# Patient Record
Sex: MALE | Race: WHITE | Employment: OTHER | URBAN - METROPOLITAN AREA
[De-identification: names, ages, dates, MRNs, and addresses within clinical notes are randomized per-mention and may not be internally consistent; named-entity substitution may affect disease eponyms.]

---

## 2018-07-23 ENCOUNTER — HOSPITAL ENCOUNTER (EMERGENCY)
Age: 53
Discharge: HOME | End: 2018-07-23
Attending: EMERGENCY MEDICINE
Payer: COMMERCIAL

## 2018-07-23 VITALS
OXYGEN SATURATION: 98 % | SYSTOLIC BLOOD PRESSURE: 124 MMHG | RESPIRATION RATE: 18 BRPM | WEIGHT: 211.1 LBS | BODY MASS INDEX: 35.17 KG/M2 | HEIGHT: 65 IN | TEMPERATURE: 98.4 F | HEART RATE: 72 BPM | DIASTOLIC BLOOD PRESSURE: 84 MMHG

## 2018-07-23 DIAGNOSIS — L03.119 CELLULITIS AND ABSCESS OF LEG: Primary | ICD-10-CM

## 2018-07-23 DIAGNOSIS — L02.419 CELLULITIS AND ABSCESS OF LEG: Primary | ICD-10-CM

## 2018-07-23 PROCEDURE — 6360000002 HC RX W HCPCS: Performed by: NURSE PRACTITIONER

## 2018-07-23 PROCEDURE — 96372 THER/PROPH/DIAG INJ SC/IM: CPT

## 2018-07-23 PROCEDURE — 99281 EMR DPT VST MAYX REQ PHY/QHP: CPT

## 2018-07-23 PROCEDURE — 2500000003 HC RX 250 WO HCPCS: Performed by: NURSE PRACTITIONER

## 2018-07-23 RX ORDER — DOXYCYCLINE HYCLATE 100 MG
100 TABLET ORAL 2 TIMES DAILY
Qty: 20 TABLET | Refills: 0 | Status: SHIPPED | OUTPATIENT
Start: 2018-07-23 | End: 2018-08-02

## 2018-07-23 RX ORDER — DOXYCYCLINE 100 MG/1
100 CAPSULE ORAL ONCE
Status: DISCONTINUED | OUTPATIENT
Start: 2018-07-23 | End: 2018-07-24 | Stop reason: HOSPADM

## 2018-07-23 RX ORDER — LIDOCAINE HYDROCHLORIDE 10 MG/ML
5 INJECTION, SOLUTION INFILTRATION; PERINEURAL ONCE
Status: COMPLETED | OUTPATIENT
Start: 2018-07-23 | End: 2018-07-23

## 2018-07-23 RX ORDER — HYDROCODONE BITARTRATE AND ACETAMINOPHEN 5; 325 MG/1; MG/1
1 TABLET ORAL EVERY 6 HOURS PRN
Qty: 10 TABLET | Refills: 0 | Status: SHIPPED | OUTPATIENT
Start: 2018-07-23 | End: 2018-07-26

## 2018-07-23 RX ORDER — CEPHALEXIN 500 MG/1
500 CAPSULE ORAL 4 TIMES DAILY
Qty: 40 CAPSULE | Refills: 0 | Status: SHIPPED | OUTPATIENT
Start: 2018-07-23

## 2018-07-23 RX ORDER — CEFTRIAXONE 1 G/1
1 INJECTION, POWDER, FOR SOLUTION INTRAMUSCULAR; INTRAVENOUS ONCE
Status: COMPLETED | OUTPATIENT
Start: 2018-07-23 | End: 2018-07-23

## 2018-07-23 RX ORDER — CEPHALEXIN 500 MG/1
500 CAPSULE ORAL ONCE
Status: DISCONTINUED | OUTPATIENT
Start: 2018-07-23 | End: 2018-07-24 | Stop reason: HOSPADM

## 2018-07-23 RX ADMIN — LIDOCAINE HYDROCHLORIDE 5 ML: 10 INJECTION, SOLUTION INFILTRATION; PERINEURAL at 21:38

## 2018-07-23 RX ADMIN — CEFTRIAXONE SODIUM 1 G: 1 INJECTION, POWDER, FOR SOLUTION INTRAMUSCULAR; INTRAVENOUS at 21:36

## 2018-07-23 ASSESSMENT — PAIN DESCRIPTION - DESCRIPTORS: DESCRIPTORS: BURNING;ACHING

## 2018-07-23 ASSESSMENT — PAIN DESCRIPTION - ORIENTATION: ORIENTATION: LEFT;POSTERIOR

## 2018-07-23 ASSESSMENT — PAIN SCALES - GENERAL: PAINLEVEL_OUTOF10: 2

## 2018-07-23 ASSESSMENT — PAIN DESCRIPTION - LOCATION: LOCATION: LEG

## 2018-07-23 ASSESSMENT — PAIN SCALES - WONG BAKER: WONGBAKER_NUMERICALRESPONSE: 2

## 2018-07-23 ASSESSMENT — PAIN DESCRIPTION - FREQUENCY: FREQUENCY: CONTINUOUS

## 2018-07-24 NOTE — ED NOTES
Pt ambulatory to room 4 with c/o insect bite to left posterior thigh, onset approximately 1 week ago. Pt reports he has been using baking soda compresses at home with no relief, states \"I think it's a spider bite\". Pt has half dollar sized red area with darkened center to left posterior thigh that is hot to the touch, no drainage noted. Pt denies any fevers or N/V. Respirations even and non labored. NAD noted.       Kevin Christina RN  07/23/18 2026

## 2018-07-24 NOTE — ED NOTES
Pt given dose of keflex and monodox per his request, due to work schedule he will be unable to get rx filled in the morning.  Pt instructed to take in the morning, pt stated understanding     Rylee Vega RN  07/23/18 2393

## 2018-07-25 ASSESSMENT — ENCOUNTER SYMPTOMS
RESPIRATORY NEGATIVE: 1
GASTROINTESTINAL NEGATIVE: 1

## 2019-12-16 ENCOUNTER — HOSPITAL ENCOUNTER (OUTPATIENT)
Age: 54
Discharge: HOME | End: 2019-12-16
Payer: COMMERCIAL

## 2019-12-16 LAB
ABSOLUTE EOS #: 0.18 K/UL (ref 0–0.44)
ABSOLUTE IMMATURE GRANULOCYTE: 0.03 K/UL (ref 0–0.3)
ABSOLUTE LYMPH #: 1.32 K/UL (ref 1.1–3.7)
ABSOLUTE MONO #: 0.6 K/UL (ref 0.1–1.2)
ALBUMIN SERPL-MCNC: 4.5 G/DL (ref 3.5–5.2)
ALBUMIN/GLOBULIN RATIO: 1.7 (ref 1–2.5)
ALP BLD-CCNC: 84 U/L (ref 40–129)
ALT SERPL-CCNC: 19 U/L (ref 5–41)
ANION GAP SERPL CALCULATED.3IONS-SCNC: 12 MMOL/L (ref 9–17)
AST SERPL-CCNC: 21 U/L
BASOPHILS # BLD: 1 % (ref 0–2)
BASOPHILS ABSOLUTE: 0.05 K/UL (ref 0–0.2)
BILIRUB SERPL-MCNC: 0.42 MG/DL (ref 0.3–1.2)
BUN BLDV-MCNC: 12 MG/DL (ref 6–20)
BUN/CREAT BLD: NORMAL (ref 9–20)
CALCIUM SERPL-MCNC: 9.2 MG/DL (ref 8.6–10.4)
CHLORIDE BLD-SCNC: 104 MMOL/L (ref 98–107)
CHOLESTEROL, FASTING: 159 MG/DL
CHOLESTEROL/HDL RATIO: 2.9
CO2: 25 MMOL/L (ref 20–31)
CREAT SERPL-MCNC: 1.12 MG/DL (ref 0.7–1.2)
DIFFERENTIAL TYPE: ABNORMAL
EOSINOPHILS RELATIVE PERCENT: 2 % (ref 1–4)
ESTIMATED AVERAGE GLUCOSE: 111 MG/DL
GFR AFRICAN AMERICAN: >60 ML/MIN
GFR NON-AFRICAN AMERICAN: >60 ML/MIN
GFR SERPL CREATININE-BSD FRML MDRD: NORMAL ML/MIN/{1.73_M2}
GFR SERPL CREATININE-BSD FRML MDRD: NORMAL ML/MIN/{1.73_M2}
GLUCOSE FASTING: 92 MG/DL (ref 70–99)
HBA1C MFR BLD: 5.5 % (ref 4–6)
HCT VFR BLD CALC: 50.5 % (ref 40.7–50.3)
HDLC SERPL-MCNC: 55 MG/DL
HEMOGLOBIN: 16.9 G/DL (ref 13–17)
IMMATURE GRANULOCYTES: 0 %
LDL CHOLESTEROL: 90 MG/DL (ref 0–130)
LYMPHOCYTES # BLD: 14 % (ref 24–43)
MCH RBC QN AUTO: 30.6 PG (ref 25.2–33.5)
MCHC RBC AUTO-ENTMCNC: 33.5 G/DL (ref 28.4–34.8)
MCV RBC AUTO: 91.5 FL (ref 82.6–102.9)
MONOCYTES # BLD: 6 % (ref 3–12)
NRBC AUTOMATED: 0 PER 100 WBC
PDW BLD-RTO: 12.4 % (ref 11.8–14.4)
PLATELET # BLD: 227 K/UL (ref 138–453)
PLATELET ESTIMATE: ABNORMAL
PMV BLD AUTO: 9.3 FL (ref 8.1–13.5)
POTASSIUM SERPL-SCNC: 4.4 MMOL/L (ref 3.7–5.3)
PROSTATE SPECIFIC ANTIGEN: 3.99 UG/L
RBC # BLD: 5.52 M/UL (ref 4.21–5.77)
RBC # BLD: ABNORMAL 10*6/UL
SEG NEUTROPHILS: 77 % (ref 36–65)
SEGMENTED NEUTROPHILS ABSOLUTE COUNT: 7.49 K/UL (ref 1.5–8.1)
SODIUM BLD-SCNC: 141 MMOL/L (ref 135–144)
THYROXINE, FREE: 1.13 NG/DL (ref 0.93–1.7)
TOTAL PROTEIN: 7.1 G/DL (ref 6.4–8.3)
TRIGLYCERIDE, FASTING: 69 MG/DL
TSH SERPL DL<=0.05 MIU/L-ACNC: 2.21 MIU/L (ref 0.3–5)
VITAMIN D 25-HYDROXY: 33.8 NG/ML (ref 30–100)
VLDLC SERPL CALC-MCNC: NORMAL MG/DL (ref 1–30)
WBC # BLD: 9.7 K/UL (ref 3.5–11.3)
WBC # BLD: ABNORMAL 10*3/UL

## 2019-12-16 PROCEDURE — 84439 ASSAY OF FREE THYROXINE: CPT

## 2019-12-16 PROCEDURE — 85025 COMPLETE CBC W/AUTO DIFF WBC: CPT

## 2019-12-16 PROCEDURE — 80061 LIPID PANEL: CPT

## 2019-12-16 PROCEDURE — 80053 COMPREHEN METABOLIC PANEL: CPT

## 2019-12-16 PROCEDURE — 36415 COLL VENOUS BLD VENIPUNCTURE: CPT

## 2019-12-16 PROCEDURE — 84153 ASSAY OF PSA TOTAL: CPT

## 2019-12-16 PROCEDURE — 82306 VITAMIN D 25 HYDROXY: CPT

## 2019-12-16 PROCEDURE — 84443 ASSAY THYROID STIM HORMONE: CPT

## 2019-12-16 PROCEDURE — 83036 HEMOGLOBIN GLYCOSYLATED A1C: CPT

## 2021-02-09 LAB — PROSTATE SPECIFIC ANTIGEN: 2.95 NG/ML

## 2022-07-04 ENCOUNTER — APPOINTMENT (OUTPATIENT)
Dept: GENERAL RADIOLOGY | Age: 57
End: 2022-07-04
Payer: COMMERCIAL

## 2022-07-04 ENCOUNTER — HOSPITAL ENCOUNTER (EMERGENCY)
Age: 57
Discharge: HOME | End: 2022-07-04
Attending: EMERGENCY MEDICINE
Payer: COMMERCIAL

## 2022-07-04 VITALS
TEMPERATURE: 98.5 F | HEIGHT: 64 IN | OXYGEN SATURATION: 95 % | RESPIRATION RATE: 18 BRPM | WEIGHT: 195 LBS | DIASTOLIC BLOOD PRESSURE: 99 MMHG | SYSTOLIC BLOOD PRESSURE: 163 MMHG | BODY MASS INDEX: 33.29 KG/M2 | HEART RATE: 92 BPM

## 2022-07-04 DIAGNOSIS — K59.00 CONSTIPATION, UNSPECIFIED CONSTIPATION TYPE: ICD-10-CM

## 2022-07-04 DIAGNOSIS — H65.02 NON-RECURRENT ACUTE SEROUS OTITIS MEDIA OF LEFT EAR: Primary | ICD-10-CM

## 2022-07-04 PROCEDURE — 6370000000 HC RX 637 (ALT 250 FOR IP): Performed by: EMERGENCY MEDICINE

## 2022-07-04 PROCEDURE — 74018 RADEX ABDOMEN 1 VIEW: CPT

## 2022-07-04 PROCEDURE — 99283 EMERGENCY DEPT VISIT LOW MDM: CPT

## 2022-07-04 RX ORDER — AMOXICILLIN 500 MG/1
500 CAPSULE ORAL ONCE
Status: COMPLETED | OUTPATIENT
Start: 2022-07-04 | End: 2022-07-04

## 2022-07-04 RX ORDER — AMOXICILLIN 500 MG/1
500 CAPSULE ORAL 2 TIMES DAILY
Qty: 14 CAPSULE | Refills: 0 | Status: SHIPPED | OUTPATIENT
Start: 2022-07-04 | End: 2022-07-11

## 2022-07-04 RX ADMIN — AMOXICILLIN 500 MG: 500 CAPSULE ORAL at 22:05

## 2022-07-04 RX ADMIN — MAGNESIUM CITRATE 296 ML: 1.75 LIQUID ORAL at 22:06

## 2022-07-04 RX ADMIN — GLYCERIN 2 G: 2 SUPPOSITORY RECTAL at 22:06

## 2022-07-05 NOTE — ED NOTES
Pt arrived to the ED with c/o sinus infection and constipation. Pt states this has been going on about a week.       Berta Cheney RN  07/04/22 2203

## 2022-07-05 NOTE — ED PROVIDER NOTES
EMERGENCY DEPARTMENT ENCOUNTER    Pt Name: Satya Sparks  MRN: [id number]  [place] 1965  Date of evaluation: 7/4/22  CHIEF COMPLAINT       Chief Complaint   Patient presents with    Otalgia    Pharyngitis    Constipation     HISTORY OF PRESENT ILLNESS   Patient is a 75-year-old male who presents to the ED for evaluation of constipation and ear pain. Last normal BM was 1 week ago. He is passing small amount of hard round zuleika of stool. He is passing flatus. Also reports pain in left ear without hearing loss. No other issues at this time. ROS:  No fevers, cough, shortness of breath, chest pain, abdominal pain, nausea, vomiting, changes in urine. REVIEW OF SYSTEMS     Review of Systems   All other systems reviewed and are negative. PASTMEDICAL HISTORY     Past Medical History:   Diagnosis Date    Chronic ear infection      SURGICAL HISTORY       Past Surgical History:   Procedure Laterality Date    APPENDECTOMY      KNEE SURGERY      left     CURRENT MEDICATIONS       Previous Medications    ASPIRIN 325 MG TABLET    Take 325 mg by mouth 3 times daily as needed. CEPHALEXIN (KEFLEX) 500 MG CAPSULE    Take 1 capsule by mouth 4 times daily     ALLERGIES     is allergic to sulfa antibiotics. FAMILY HISTORY     He indicated that the status of his mother is unknown. SOCIAL HISTORY       Social History     Tobacco Use    Smoking status: Former Smoker     Packs/day: 0.00    Smokeless tobacco: Never Used   Substance Use Topics    Alcohol use: No    Drug use: No     PHYSICAL EXAM     INITIAL VITALS: BP (!) 163/99   Pulse 92   Temp 98.5 °F (36.9 °C) (Oral)   Resp 18   Ht 5' 4\" (1.626 m)   Wt 195 lb (88.5 kg)   SpO2 95%   BMI 33.47 kg/m²    Physical Exam  HENT:      Head: Normocephalic. Right Ear: External ear normal. Tympanic membrane is injected.       Left Ear: Tympanic membrane and external ear normal.      Nose: Nose normal.   Eyes:      Conjunctiva/sclera: Conjunctivae normal.   Cardiovascular:      Rate and Rhythm: Normal rate. Pulmonary:      Effort: Pulmonary effort is normal.   Abdominal:      General: Abdomen is flat. Palpations: Abdomen is soft. Tenderness: There is abdominal tenderness. There is no guarding or rebound. Hernia: No hernia is present. Skin:     General: Skin is dry. Neurological:      Mental Status: He is alert. Mental status is at baseline. Psychiatric:         Mood and Affect: Mood normal.         Behavior: Behavior normal.         MEDICAL DECISION MAKING:   The patient is hemodynamically stable, afebrile, nontoxic-appearing. Physical exam notable for generalized abdominal tenderness without rebound or guarding, injected left TM. Based on history and exam likely acute otitis media with constipation. ED plan for KUB, amoxicillin, suppository, mag citrate, discharge. DIAGNOSTIC RESULTS   EKG:All EKG's are interpreted by the Emergency Department Physician who either signs or Co-signs this chart in the absence of a cardiologist.        RADIOLOGY:All plain film, CT, MRI, and formal ultrasound images (except ED bedside ultrasound) are read by the radiologist, see reports below, unless otherwisenoted in MDM or here. XR ABDOMEN (KUB) (SINGLE AP VIEW)   Final Result   No acute abnormality detected. Normal stool burden. LABS: All lab results were reviewed by myself, and all abnormals are listed below. Labs Reviewed - No data to display    EMERGENCY DEPARTMENTCOURSE:   Patient did well in the ED. X-ray negative for acute pathology. Given first dose of amoxicillin in the ED. Given Rx for amoxicillin for home. Given suppository and mag citrate for home. No further work-up indicated at this time. Nursing notes reviewed. At this time this is what I find, the patient appears well and does not appear sick or toxic. I gave my usual and customary discussion of the risks and benefits of discharge versus admission. I answered the patient's questions. I gave the patient strict return precautions. Patient expressed understanding of the discharge instructions. The care is provided during an unprecedented national emergency due to the novel coronavirus, COVID-19. Dictated but not reviewed. Vitals:    Vitals:    07/04/22 2002 07/04/22 2004   BP:  (!) 163/99   Pulse: 92    Resp: 18    Temp: 98.5 °F (36.9 °C)    TempSrc: Oral    SpO2: 95%    Weight: 195 lb (88.5 kg)    Height: 5' 4\" (1.626 m)        The patient was given the following medications while in the emergency department:  Orders Placed This Encounter   Medications    amoxicillin (AMOXIL) capsule 500 mg     Order Specific Question:   Antimicrobial Indications     Answer:   Skin and Soft Tissue Infection    glycerin (ADULT) suppository 2 g    magnesium citrate solution 296 mL    amoxicillin (AMOXIL) 500 MG capsule     Sig: Take 1 capsule by mouth 2 times daily for 7 days     Dispense:  14 capsule     Refill:  0     CONSULTS:  None    FINAL IMPRESSION      1. Non-recurrent acute serous otitis media of left ear    2.  Constipation, unspecified constipation type          DISPOSITION/PLAN   DISPOSITION Decision To Discharge 07/04/2022 09:58:25 PM      PATIENT REFERRED TO:  Meghan Wick MD  [address]  [phone]    In 2 days      DISCHARGE MEDICATIONS:  New Prescriptions    AMOXICILLIN (AMOXIL) 500 MG CAPSULE    Take 1 capsule by mouth 2 times daily for 7 days     Deric Paul MD  Attending Emergency Physician                    Concepcion Barnett MD  07/04/22 1693

## 2022-12-04 ENCOUNTER — APPOINTMENT (OUTPATIENT)
Dept: CT IMAGING | Age: 57
DRG: 466 | End: 2022-12-04
Payer: COMMERCIAL

## 2022-12-04 ENCOUNTER — HOSPITAL ENCOUNTER (INPATIENT)
Age: 57
LOS: 2 days | Discharge: HOME | DRG: 466 | End: 2022-12-06
Attending: EMERGENCY MEDICINE | Admitting: FAMILY MEDICINE
Payer: COMMERCIAL

## 2022-12-04 DIAGNOSIS — R33.9 RETENTION OF URINE: Primary | ICD-10-CM

## 2022-12-04 DIAGNOSIS — N39.0 URINARY TRACT INFECTION WITH HEMATURIA, SITE UNSPECIFIED: ICD-10-CM

## 2022-12-04 DIAGNOSIS — R31.9 URINARY TRACT INFECTION WITH HEMATURIA, SITE UNSPECIFIED: ICD-10-CM

## 2022-12-04 DIAGNOSIS — R93.5 ABNORMAL CT OF THE ABDOMEN: ICD-10-CM

## 2022-12-04 DIAGNOSIS — N17.9 AKI (ACUTE KIDNEY INJURY) (HCC): ICD-10-CM

## 2022-12-04 PROBLEM — N32.0 BLADDER OUTLET OBSTRUCTION: Status: ACTIVE | Noted: 2022-12-04

## 2022-12-04 PROBLEM — N13.30 HYDRONEPHROSIS: Status: ACTIVE | Noted: 2022-12-04

## 2022-12-04 PROBLEM — N40.1 BENIGN PROSTATIC HYPERPLASIA WITH URINARY RETENTION: Status: ACTIVE | Noted: 2022-09-06

## 2022-12-04 PROBLEM — R33.8 BENIGN PROSTATIC HYPERPLASIA WITH URINARY RETENTION: Status: ACTIVE | Noted: 2022-09-06

## 2022-12-04 PROBLEM — N18.9 ACUTE KIDNEY INJURY SUPERIMPOSED ON CHRONIC KIDNEY DISEASE (HCC): Status: ACTIVE | Noted: 2022-12-04

## 2022-12-04 PROBLEM — N20.0 NEPHROLITHIASIS: Status: ACTIVE | Noted: 2022-09-06

## 2022-12-04 PROBLEM — N30.01 ACUTE CYSTITIS WITH HEMATURIA: Status: ACTIVE | Noted: 2022-12-04

## 2022-12-04 LAB
ABSOLUTE EOS #: 0 K/UL (ref 0–0.4)
ABSOLUTE IMMATURE GRANULOCYTE: 0 K/UL (ref 0–0.3)
ABSOLUTE LYMPH #: 0.39 K/UL (ref 1–4.8)
ABSOLUTE MONO #: 1.38 K/UL (ref 0.2–0.8)
AMORPHOUS: ABNORMAL
ANION GAP SERPL CALCULATED.3IONS-SCNC: 13 MMOL/L (ref 9–17)
BASOPHILS # BLD: 0 %
BASOPHILS ABSOLUTE: 0 K/UL (ref 0–0.2)
BILIRUBIN URINE: NEGATIVE
BUN BLDV-MCNC: 44 MG/DL (ref 6–20)
BUN/CREAT BLD: 27 (ref 9–20)
CALCIUM SERPL-MCNC: 10.3 MG/DL (ref 8.6–10.4)
CHLORIDE BLD-SCNC: 103 MMOL/L (ref 98–107)
CO2: 24 MMOL/L (ref 20–31)
COLOR: YELLOW
CREAT SERPL-MCNC: 1.66 MG/DL (ref 0.7–1.2)
EOSINOPHILS RELATIVE PERCENT: 0 % (ref 1–4)
EPITHELIAL CELLS UA: ABNORMAL /HPF (ref 0–5)
GFR SERPL CREATININE-BSD FRML MDRD: 47 ML/MIN/1.73M2
GLUCOSE BLD-MCNC: 165 MG/DL (ref 70–99)
GLUCOSE URINE: NEGATIVE
HCT VFR BLD CALC: 46.9 % (ref 40.7–50.3)
HEMOGLOBIN: 15.3 G/DL (ref 13–17)
IMMATURE GRANULOCYTES: 0 %
KETONES, URINE: NEGATIVE
LACTIC ACID, SEPSIS: 1 MMOL/L (ref 0.5–1.9)
LEUKOCYTE ESTERASE, URINE: ABNORMAL
LYMPHOCYTES # BLD: 2 % (ref 24–44)
MCH RBC QN AUTO: 29.2 PG (ref 25.2–33.5)
MCHC RBC AUTO-ENTMCNC: 32.6 G/DL (ref 28.4–34.8)
MCV RBC AUTO: 89.5 FL (ref 82.6–102.9)
MONOCYTES # BLD: 7 % (ref 1–7)
NITRITE, URINE: NEGATIVE
NRBC AUTOMATED: 0 PER 100 WBC
PDW BLD-RTO: 15.3 % (ref 11.8–14.4)
PH UA: 8.5 (ref 5–8)
PLATELET # BLD: 250 K/UL (ref 138–453)
PMV BLD AUTO: 8.3 FL (ref 8.1–13.5)
POTASSIUM SERPL-SCNC: 4.8 MMOL/L (ref 3.7–5.3)
PROTEIN UA: ABNORMAL
RBC # BLD: 5.24 M/UL (ref 4.21–5.77)
RBC UA: ABNORMAL /HPF (ref 0–2)
SEG NEUTROPHILS: 91 % (ref 36–66)
SEGMENTED NEUTROPHILS ABSOLUTE COUNT: 17.93 K/UL (ref 1.8–7.7)
SODIUM BLD-SCNC: 140 MMOL/L (ref 135–144)
SPECIFIC GRAVITY UA: 1.01 (ref 1–1.03)
TURBIDITY: ABNORMAL
URINE HGB: ABNORMAL
UROBILINOGEN, URINE: NORMAL
WBC # BLD: 19.7 K/UL (ref 3.5–11.3)
WBC UA: ABNORMAL /HPF (ref 0–5)

## 2022-12-04 PROCEDURE — 81001 URINALYSIS AUTO W/SCOPE: CPT

## 2022-12-04 PROCEDURE — 6370000000 HC RX 637 (ALT 250 FOR IP): Performed by: NURSE PRACTITIONER

## 2022-12-04 PROCEDURE — 74176 CT ABD & PELVIS W/O CONTRAST: CPT

## 2022-12-04 PROCEDURE — 83605 ASSAY OF LACTIC ACID: CPT

## 2022-12-04 PROCEDURE — 87040 BLOOD CULTURE FOR BACTERIA: CPT

## 2022-12-04 PROCEDURE — 96375 TX/PRO/DX INJ NEW DRUG ADDON: CPT

## 2022-12-04 PROCEDURE — 87077 CULTURE AEROBIC IDENTIFY: CPT

## 2022-12-04 PROCEDURE — 96365 THER/PROPH/DIAG IV INF INIT: CPT

## 2022-12-04 PROCEDURE — 99285 EMERGENCY DEPT VISIT HI MDM: CPT

## 2022-12-04 PROCEDURE — 87205 SMEAR GRAM STAIN: CPT

## 2022-12-04 PROCEDURE — 1200000000 HC SEMI PRIVATE

## 2022-12-04 PROCEDURE — 87086 URINE CULTURE/COLONY COUNT: CPT

## 2022-12-04 PROCEDURE — 80048 BASIC METABOLIC PNL TOTAL CA: CPT

## 2022-12-04 PROCEDURE — 2580000003 HC RX 258: Performed by: NURSE PRACTITIONER

## 2022-12-04 PROCEDURE — 99222 1ST HOSP IP/OBS MODERATE 55: CPT | Performed by: NURSE PRACTITIONER

## 2022-12-04 PROCEDURE — 6360000002 HC RX W HCPCS: Performed by: NURSE PRACTITIONER

## 2022-12-04 PROCEDURE — 87186 SC STD MICRODIL/AGAR DIL: CPT

## 2022-12-04 PROCEDURE — 85025 COMPLETE CBC W/AUTO DIFF WBC: CPT

## 2022-12-04 PROCEDURE — 87154 CUL TYP ID BLD PTHGN 6+ TRGT: CPT

## 2022-12-04 RX ORDER — ONDANSETRON 2 MG/ML
4 INJECTION INTRAMUSCULAR; INTRAVENOUS ONCE
Status: COMPLETED | OUTPATIENT
Start: 2022-12-04 | End: 2022-12-04

## 2022-12-04 RX ORDER — HYDRALAZINE HYDROCHLORIDE 20 MG/ML
5 INJECTION INTRAMUSCULAR; INTRAVENOUS EVERY 6 HOURS PRN
Status: DISCONTINUED | OUTPATIENT
Start: 2022-12-04 | End: 2022-12-05

## 2022-12-04 RX ORDER — MORPHINE SULFATE 4 MG/ML
4 INJECTION, SOLUTION INTRAMUSCULAR; INTRAVENOUS ONCE
Status: COMPLETED | OUTPATIENT
Start: 2022-12-04 | End: 2022-12-04

## 2022-12-04 RX ORDER — ACETAMINOPHEN 325 MG/1
650 TABLET ORAL EVERY 6 HOURS PRN
Status: DISCONTINUED | OUTPATIENT
Start: 2022-12-04 | End: 2022-12-06 | Stop reason: HOSPADM

## 2022-12-04 RX ORDER — POLYETHYLENE GLYCOL 3350 17 G/17G
17 POWDER, FOR SOLUTION ORAL DAILY PRN
Status: DISCONTINUED | OUTPATIENT
Start: 2022-12-04 | End: 2022-12-06 | Stop reason: HOSPADM

## 2022-12-04 RX ORDER — ONDANSETRON 4 MG/1
4 TABLET, ORALLY DISINTEGRATING ORAL EVERY 8 HOURS PRN
Status: DISCONTINUED | OUTPATIENT
Start: 2022-12-04 | End: 2022-12-06 | Stop reason: HOSPADM

## 2022-12-04 RX ORDER — SODIUM CHLORIDE 0.9 % (FLUSH) 0.9 %
5-40 SYRINGE (ML) INJECTION EVERY 12 HOURS SCHEDULED
Status: DISCONTINUED | OUTPATIENT
Start: 2022-12-04 | End: 2022-12-06 | Stop reason: HOSPADM

## 2022-12-04 RX ORDER — ENOXAPARIN SODIUM 100 MG/ML
40 INJECTION SUBCUTANEOUS DAILY
Status: CANCELLED | OUTPATIENT
Start: 2022-12-04

## 2022-12-04 RX ORDER — SODIUM CHLORIDE 9 MG/ML
INJECTION, SOLUTION INTRAVENOUS PRN
Status: DISCONTINUED | OUTPATIENT
Start: 2022-12-04 | End: 2022-12-06 | Stop reason: HOSPADM

## 2022-12-04 RX ORDER — ACETAMINOPHEN 650 MG/1
650 SUPPOSITORY RECTAL EVERY 6 HOURS PRN
Status: DISCONTINUED | OUTPATIENT
Start: 2022-12-04 | End: 2022-12-06 | Stop reason: HOSPADM

## 2022-12-04 RX ORDER — SODIUM CHLORIDE 0.9 % (FLUSH) 0.9 %
5-40 SYRINGE (ML) INJECTION PRN
Status: DISCONTINUED | OUTPATIENT
Start: 2022-12-04 | End: 2022-12-06 | Stop reason: HOSPADM

## 2022-12-04 RX ORDER — ONDANSETRON 2 MG/ML
4 INJECTION INTRAMUSCULAR; INTRAVENOUS EVERY 6 HOURS PRN
Status: DISCONTINUED | OUTPATIENT
Start: 2022-12-04 | End: 2022-12-06 | Stop reason: HOSPADM

## 2022-12-04 RX ORDER — LIDOCAINE HYDROCHLORIDE 20 MG/ML
5 JELLY TOPICAL ONCE
Status: COMPLETED | OUTPATIENT
Start: 2022-12-04 | End: 2022-12-04

## 2022-12-04 RX ORDER — SODIUM CHLORIDE 9 MG/ML
INJECTION, SOLUTION INTRAVENOUS CONTINUOUS
Status: DISCONTINUED | OUTPATIENT
Start: 2022-12-04 | End: 2022-12-05

## 2022-12-04 RX ORDER — 0.9 % SODIUM CHLORIDE 0.9 %
1000 INTRAVENOUS SOLUTION INTRAVENOUS ONCE
Status: COMPLETED | OUTPATIENT
Start: 2022-12-04 | End: 2022-12-04

## 2022-12-04 RX ADMIN — ONDANSETRON 4 MG: 2 INJECTION INTRAMUSCULAR; INTRAVENOUS at 11:06

## 2022-12-04 RX ADMIN — SODIUM CHLORIDE: 9 INJECTION, SOLUTION INTRAVENOUS at 20:51

## 2022-12-04 RX ADMIN — SODIUM CHLORIDE: 9 INJECTION, SOLUTION INTRAVENOUS at 17:22

## 2022-12-04 RX ADMIN — SODIUM CHLORIDE 1000 ML: 9 INJECTION, SOLUTION INTRAVENOUS at 12:56

## 2022-12-04 RX ADMIN — LIDOCAINE HYDROCHLORIDE 5 ML: 20 JELLY TOPICAL at 12:15

## 2022-12-04 RX ADMIN — CEFTRIAXONE SODIUM 1000 MG: 1 INJECTION, POWDER, FOR SOLUTION INTRAMUSCULAR; INTRAVENOUS at 13:25

## 2022-12-04 RX ADMIN — MORPHINE SULFATE 4 MG: 4 INJECTION, SOLUTION INTRAMUSCULAR; INTRAVENOUS at 11:06

## 2022-12-04 ASSESSMENT — PAIN SCALES - GENERAL
PAINLEVEL_OUTOF10: 10
PAINLEVEL_OUTOF10: 0
PAINLEVEL_OUTOF10: 10
PAINLEVEL_OUTOF10: 0

## 2022-12-04 ASSESSMENT — ENCOUNTER SYMPTOMS
COLOR CHANGE: 0
NAUSEA: 0
COUGH: 0
VOMITING: 0
DIARRHEA: 0
BACK PAIN: 0
SHORTNESS OF BREATH: 0
ABDOMINAL PAIN: 1

## 2022-12-04 ASSESSMENT — PAIN - FUNCTIONAL ASSESSMENT: PAIN_FUNCTIONAL_ASSESSMENT: 0-10

## 2022-12-04 NOTE — ED PROVIDER NOTES
eMERGENCY dEPARTMENT eNCOUnter   Independent Attestation     Pt Name: Akbar Wu  MRN: [id number]  [place] 1965  Date of evaluation: 12/4/22     Akbar Wu is a 59 y.o. male with CC: Urinary Retention (Pt states he has a catheter in and it came out of position this AM )        This visit was performed by both a physician and an APC. I performed all aspects of the MDM as documented.       The care is provided during an unprecedented national emergency due to the novel coronavirus, Gerda Winchester MD  Attending Emergency Physician            Evelyn Tubbs MD  12/04/22 5347

## 2022-12-04 NOTE — ED PROVIDER NOTES
[address] ED  eMERGENCY dEPARTMENT eNCOUnter      Pt Name: Makenna Pizarro  MRN: [id number]  [place] 1965  Date of evaluation: 12/4/2022  Provider: JOSEPH English CNP    CHIEF COMPLAINT       Chief Complaint   Patient presents with    Urinary Retention     Pt states he has a catheter in and it came out of position this AM          HISTORY OF PRESENT ILLNESS  (Location/Symptom, Timing/Onset, Context/Setting, Quality, Duration, Modifying Factors, Severity.)   Makenna Pizarro is a 59 y.o. male who presents to the emergency department. C/o urinary retention. He has a henderson catheter in place. It has not been draining since last night. He does not have the catheter tubing connected to a bag at this time. He has a hx of urinary retention. Denies fever, chills, flank pain. Reports low abd discomfort. He was visualized by the nurse attempting to insert a second catheter into his urethral; he brought the catheter from home. She advised him to discontinue this action. Rates his pain 10/10 at this time. Nursing Notes were reviewed. ALLERGIES     Sulfa antibiotics    CURRENT MEDICATIONS       Previous Medications    ASPIRIN 325 MG TABLET    Take 325 mg by mouth 3 times daily as needed. CEPHALEXIN (KEFLEX) 500 MG CAPSULE    Take 1 capsule by mouth 4 times daily       PAST MEDICAL HISTORY         Diagnosis Date    Chronic ear infection        SURGICAL HISTORY           Procedure Laterality Date    APPENDECTOMY      KNEE SURGERY      left         FAMILY HISTORY           Problem Relation Age of Onset    High Blood Pressure Mother      Family Status   Relation Name Status    Mother  (Not Specified)        SOCIAL HISTORY      reports that he has quit smoking. He has never used smokeless tobacco. He reports that he does not drink alcohol and does not use drugs.     REVIEW OF SYSTEMS    (2-9 systems for level 4, 10 or more for level 5)     Review of Systems   Constitutional:  Negative for chills, diaphoresis, fatigue and fever. Respiratory:  Negative for cough and shortness of breath. Cardiovascular:  Negative for chest pain. Gastrointestinal:  Positive for abdominal pain. Negative for diarrhea, nausea and vomiting. Genitourinary:  Positive for difficulty urinating. Negative for flank pain, penile discharge, penile pain, penile swelling, scrotal swelling, testicular pain and urgency. Musculoskeletal:  Negative for back pain. Skin:  Negative for color change, rash and wound. Neurological:  Negative for dizziness, weakness and headaches. Except as noted above the remainder of the review of systems was reviewed and negative. PHYSICAL EXAM    (up to 7 for level 4, 8 or more for level 5)     ED Triage Vitals [12/04/22 1033]   BP Temp Temp Source Heart Rate Resp SpO2 Height Weight   (!) 148/101 98.4 °F (36.9 °C) Oral 99 24 99 % 5' 4\" (1.626 m) 145 lb (65.8 kg)     Physical Exam  Vitals reviewed. Constitutional:       General: He is in acute distress. Appearance: He is well-developed. He is not diaphoretic. Eyes:      General: No scleral icterus. Conjunctiva/sclera: Conjunctivae normal.   Cardiovascular:      Rate and Rhythm: Normal rate. Pulmonary:      Effort: Pulmonary effort is normal. No respiratory distress. Breath sounds: No stridor. Abdominal:      General: There is no distension. Palpations: Abdomen is soft. Tenderness: There is abdominal tenderness in the suprapubic area. There is no right CVA tenderness, left CVA tenderness or guarding. Musculoskeletal:      Cervical back: Neck supple. Comments: Moves extremities. Skin:     General: Skin is warm and dry. Findings: No rash. Neurological:      Mental Status: He is alert and oriented to person, place, and time.    Psychiatric:         Behavior: Behavior normal.        DIAGNOSTIC RESULTS     RADIOLOGY:   Non-plain film images such as CT, Ultrasound and MRI are read by the radiologist. Plain radiographic images are visualized and preliminarily interpreted by the emergency physician with the below findings:    Interpretation per the Radiologist below, if available at the time of this note:    CT ABDOMEN PELVIS WO CONTRAST Additional Contrast? None    Addendum Date: 12/4/2022    ADDENDUM: Correction of voice transcription error in the impression. IMPRESSION 4 should read as follows: Urinary bladder catheterized. Gas pockets in the bladder attributed to catheterization. Result Date: 12/4/2022  EXAMINATION: CT OF THE ABDOMEN AND PELVIS WITHOUT CONTRAST 12/4/2022 11:18 am TECHNIQUE: CT of the abdomen and pelvis was performed without the administration of intravenous contrast. Multiplanar reformatted images are provided for review. Automated exposure control, iterative reconstruction, and/or weight based adjustment of the mA/kV was utilized to reduce the radiation dose to as low as reasonably achievable. COMPARISON: None. HISTORY: ORDERING SYSTEM PROVIDED HISTORY: low abd pain, catheter problem TECHNOLOGIST PROVIDED HISTORY: low abd pain, catheter problem Decision Support Exception - unselect if not a suspected or confirmed emergency medical condition->Emergency Medical Condition (MA) Reason for Exam: Lower abdominal pain, urinary retention. Had henderson placed x 2 weeks ago. Hx appendectomy, inguinal hernia repair. FINDINGS: Study limited by motion artifacts. Lower Chest: Bandlike subsegmental atelectasis left lung base. Organs: Liver, spleen, pancreas, adrenal glands and gallbladder show no significant abnormalities. 3 mm nonobstructing calculus lower pole right kidney. Bilateral moderate hydronephrosis and hydroureter. No ureteric calculus or obstructing lesion is evident. There is layering calcification across the dependent portion of the urinary bladder and also extending along the right wall to the 9 o'clock position. Probably combination of intraluminal and intramural calcification.  There is also asymmetric soft tissue wall thickening along the posterior right side where there is calcification, series 2, image 125. Other small foci of bladder wall calcification noted on the left at about 3 o'clock position, series 2, image 125. There also some gas pockets in the urinary bladder which may be attributed to catheterization. Urinary bladder is distended. There is perivesical fat stranding. Cystitis is not excluded. GI/Bowel: There is limited evaluation due to absence of oral contrast. The stomach shows no focal lesions. Small bowel loops normal in caliber showing no focal abnormalities. No evidence for appendicitis. Evaluation of the colon shows no acute process. Pelvis: Fat stranding and free fluid in the pelvis centered around urinary bladder and prostate suggest infective process. Peritoneum/Retroperitoneum: There is small volume free intraperitoneal fluid. Fluid is seen around the liver and also in the pelvis as discussed above. No sizable lymphadenopathy. Bones/Soft Tissues: Bilateral fat containing inguinal hernia. Some of the free fluid extends into the inguinal canal.  Diffuse degenerative changes in the bones. 1. Urinary bladder distension with some wall thickening and scattered wall calcification. Calcification most pronounced from 6 to 9 o'clock position in the axial plane where there is also asymmetric soft tissue wall thickening at around the 9 o'clock position. Bladder malignancy is not excluded. Correlation with cystoscopy advised. 2. There is perivesical fat stranding and free fluid in the pelvis and free fluid elsewhere around the liver. Difficult to determine if the perivesical fat stranding is due to bladder inflammation or due to the free fluid. Nonetheless concern for cystitis. 3. Moderate bilateral hydronephrosis and hydroureter attributed to obstruction at the level of the urinary bladder and potentially bladder outlet obstruction. 4. Urinary bladder catheterized. Gas pockets in the bladder attributed gastritis a shin. 5. Bilateral inguinal hernia. RECOMMENDATIONS: Urology consultation. LABS:  Labs Reviewed   CBC WITH AUTO DIFFERENTIAL - Abnormal; Notable for the following components:       Result Value    WBC 19.7 (*)     RDW 15.3 (*)     Seg Neutrophils 91 (*)     Lymphocytes 2 (*)     Eosinophils % 0 (*)     Segs Absolute 17.93 (*)     Absolute Lymph # 0.39 (*)     Absolute Mono # 1.38 (*)     All other components within normal limits   BASIC METABOLIC PANEL - Abnormal; Notable for the following components:    Glucose 165 (*)     BUN 44 (*)     Creatinine 1.66 (*)     Est, Glom Filt Rate 47 (*)     Bun/Cre Ratio 27 (*)     All other components within normal limits   URINALYSIS WITH MICROSCOPIC - Abnormal; Notable for the following components:    Turbidity UA Cloudy (*)     Urine Hgb 3+ (*)     pH, UA 8.5 (*)     Protein, UA 3+ (*)     Leukocyte Esterase, Urine LARGE (*)     Amorphous, UA 4+ (*)     All other components within normal limits   CULTURE, URINE   CULTURE, BLOOD 2   CULTURE, BLOOD 1   LACTATE, SEPSIS   LACTATE, SEPSIS       All other labs were within normal range or not returned as of this dictation.     EMERGENCY DEPARTMENT COURSE and DIFFERENTIAL DIAGNOSIS/MDM:   Vitals:    Vitals:    12/04/22 1033 12/04/22 1302   BP: (!) 148/101 (!) 168/89   Pulse: 99 88   Resp: 24 16   Temp: 98.4 °F (36.9 °C) 98 °F (36.7 °C)   TempSrc: Oral    SpO2: 99% 97%   Weight: 145 lb (65.8 kg)    Height: 5' 4\" (1.626 m)          MEDICATIONS GIVEN IN THE ED:  Medications   morphine sulfate (PF) injection 4 mg (4 mg IntraVENous Given 12/4/22 1106)   ondansetron (ZOFRAN) injection 4 mg (4 mg IntraVENous Given 12/4/22 1106)   lidocaine (XYLOCAINE) 2 % uro-jet 5 mL (5 mLs Urethral Given 12/4/22 1215)   cefTRIAXone (ROCEPHIN) 1,000 mg in dextrose 5 % 50 mL IVPB mini-bag (1,000 mg IntraVENous New Bag 12/4/22 1325)   0.9 % sodium chloride bolus (1,000 mLs IntraVENous New Bag 12/4/22 1256)       CLINICAL DECISION MAKING:  The patient presented alert with a nontoxic appearance and was seen in conjunction with Dr. Shannon Avilez. The patient's henderson catheter was removed and a new one inserted by the RN. He had >1000 mLs of output. His creatinine was elevated. Urinalysis showed infection; culture was pending. He was started on IV antibiotics. CT scan of his abdomen was abnormal. I spoke with Harjeet Grimes CNP from [facility] and Dr. Monica Bob for urology. Care was provided during an unprecedented national emergency due to the novel coronavirus, Covid-19. CONSULTS:  IP CONSULT TO UROLOGY  IP CONSULT TO INTERNAL MEDICINE      FINAL IMPRESSION      1. Retention of urine    2. Urinary tract infection with hematuria, site unspecified    3. YOAN (acute kidney injury) ([facility] [address]    4. Abnormal CT of the abdomen            DISPOSITION/PLAN   DISPOSITION Decision To Admit 12/04/2022 02:00:10 PM      PATIENT REFERRED TO:   No follow-up provider specified.     DISCHARGE MEDICATIONS:     New Prescriptions    No medications on file           (Please note that portions of this note were completed with a voice recognition program.  Efforts were made to edit the dictations but occasionally words are mis-transcribed.)    JOSEPH Vallecillo CNP, APRN - CNP  12/04/22 [id number]

## 2022-12-04 NOTE — ED NOTES
ED to inpatient nurses report     Chief Complaint   Patient presents with    Urinary Retention     Pt states he has a catheter in and it came out of position this AM       Present to ED from home  LOC: alert and orientated to name, place, date  Vital signs   Vitals:    12/04/22 1033 12/04/22 1302   BP: (!) 148/101 (!) 168/89   Pulse: 99 88   Resp: 24 16   Temp: 98.4 °F (36.9 °C) 98 °F (36.7 °C)   TempSrc: Oral    SpO2: 99% 97%   Weight: 145 lb (65.8 kg)    Height: 5' 4\" (1.626 m)       Oxygen Baseline 97    Current needs required RA   SEPSIS:   [] Lactate X 2 ordered (Yes or No)  [] Antibiotics given (Yes or No)  [] IV Fluids ordered (Yes or No)             [] IV completed (Yes or No)  [] Hourly Vital Signs (Validated)  [] Outstanding Orders:     LDAs:   Peripheral IV 12/04/22 Right Forearm (Active)   Site Assessment Clean, dry & intact 12/04/22 1100   Line Status Blood return noted 12/04/22 1100   Phlebitis Assessment No symptoms 12/04/22 1100   Infiltration Assessment 0 12/04/22 1100   Dressing Status New dressing applied 12/04/22 1100   Dressing Type Transparent 12/04/22 1100   Dressing Intervention New 12/04/22 1100     Mobility: Independent  Fall Risk:    Pending ED orders: admission  Present condition: stable  Code Status: full  Consults: IP CONSULT TO UROLOGY  IP CONSULT TO INTERNAL MEDICINE  []  Hospitalist  Completed  [] yes [] no Who:   []  Medicine  Completed  [] yes [] No Who:   []  Cardiology  Completed  [] yes [] No Who:   []  GI   Completed  [] yes [] No Who:   []  Neurology  Completed  [] yes [] No Who:   []  Nephrology Completed  [] yes [] No Who:    []  Vascular  Completed  [] yes [] No Who:   []  Ortho  Completed  [] yes [] No Who:     []  Surgery  Completed  [] yes [] No Who:    []  Urology  Completed  [] yes [] No Who:    []  CT Surgery Completed  [] yes [] No Who:   []  Podiatry  Completed  [] yes [] No Who:    []  Other    Completed  [] yes [] No Who:  Interventions: IV, Labs, Blood cultures, IV antibiotics, IV Zofran, IV Morphine, Floey. Important Events: will discuss in report.         Electronically signed by Cheryl Young RN on 12/4/2022 at 2:07 PM     Yane Doyle RN  12/04/22 0167

## 2022-12-04 NOTE — H&P
[facility]  Office: [address], DO, Carmelo Aguilar, DO, Vivienne Oliveros, DO, Lydia Mendozageorgina Rascon, DO, Jono Soares MD, Francisco Javier Cherry MD, Johanna Yeung MD, Janes Arzate MD,  Mikala Barrios MD, Blaise Pimentel MD, Emmanuel Shaw, DO, Mila Goel MD,  David Quintero MD, Johnathan Hussein MD, Tan Gerardo DO, Hossein Pa MD, Juancarlos Cee MD, Carol Balbuena DO, Austyn Mathis MD, Shania Roper MD, Fransico Boykin MD, Chad Branch MD, Tommy Christian DO, Fannie Patel MD, Angi Castellon MD, Maxime Peralta, CNP,  Herve Iqbal, CNP, Miesha Vazquez, CNP, Mojgan Castellon, CNP,  Rosa Whitfieldo, [facility], Glen Clayton, CNP, Lori Gonzalez, CNP, Bisi Kidd, CNP, Deisy Galvez, CNP, Suzi Echeverria, CNP, Ana Dick PA-C, Beverly Reyes, CNS, Tushar Monteiro, CNP, Anastasiya Coon, [facility]    HISTORY AND PHYSICAL EXAMINATION            Date:   12/4/2022  Patient name:  Susannah Dumas  Date of admission:  12/4/2022 10:36 AM  MRN:   [id number]  Account:  [de-identified]  YOB: 1965  PCP:    Yunier Ma MD  Room:   [place] [address]  Code Status:    No Order    Chief Complaint:     Chief Complaint   Patient presents with    Urinary Retention     Pt states he has a catheter in and it came out of position this AM        History Obtained From:     electronic medical record    History of Present Illness:     Susannah Dumas is a 59 y.o. Non- / non  male who presents with Urinary Retention (Pt states he has a catheter in and it came out of position this AM )   and is admitted to the hospital for the management of Acute cystitis with hematuria. According to the notes the patient presented to the ER this morning with complaints that his Garcia catheter was not draining.   Cording to the ER attending the catheter was in the patient but it was not attached to a Garcia bag and no urine was draining from it. That catheter was removed and a new catheter was inserted. CT was concerning for possible asymmetric soft tissue wall thickening around the 9 o'clock position that could be related to malignancy. There is also free fluid in the pelvis and fluid around the liver with perivesical fat stranding that could be concerning for cystitis. He also has moderate bilateral hydronephrosis and hyper ureter due to obstruction at the level of the urinary bladder and potential bladder outlet obstruction. Patient received fluids, blood cultures were obtained, he received Rocephin and urology was consulted. Patient was admitted to [facility] in July, in ICU with acute renal failure secondary to obstructive uropathy, believed to be caused by BPH and required temporary dialysis. CT of the abdomen without contrast at that time showed mild pelvocaliectasis bilaterally, no obstructing calculi and a  nonobstructive right nephrolithiasis. Renal US 7/18/22 showed Mild pelvocaliectasis to both kidneys, a 4 mm right renal stone, no visible left-sided urinary tract calculi and no appreciable renal mass. He has been following with Dr Christy Cronin related to urinary retention and continued need for indwelling henderson. Assessment the patient is arousable but quickly falls back to sleep and its difficult to obtain history. Past Medical History:     Past Medical History:   Diagnosis Date    BPH with obstruction/lower urinary tract symptoms     Chronic ear infection     Renal failure         Past Surgical History:     Past Surgical History:   Procedure Laterality Date    APPENDECTOMY      KNEE SURGERY      left        Medications Prior to Admission:     Prior to Admission medications    Medication Sig Start Date End Date Taking? Authorizing Provider   cephALEXin (KEFLEX) 500 MG capsule Take 1 capsule by mouth 4 times daily 7/23/18   JOSEPH Trevino CNP   aspirin 325 MG tablet Take 325 mg by mouth 3 times daily as needed. Historical Provider, MD        Allergies:     Sulfa antibiotics    Social History:     Tobacco:    reports that he has been smoking cigarettes. He has a 17.50 pack-year smoking history. He has never used smokeless tobacco.  Alcohol:      reports no history of alcohol use. Drug Use:  reports no history of drug use. Family History:     Family History   Problem Relation Age of Onset    High Blood Pressure Mother     Lung Cancer Mother     Stomach Cancer Father     Pancreatic Cancer Paternal Grandmother     Pancreatic Cancer Other        Review of Systems:     Positive and Negative as described in HPI. Review of Systems   Unable to perform ROS: Other     During my assessment the patient is arousable but continuously falls back to sleep during my questions. Received Morphine IV earlier in the shift    Physical Exam:   BP (!) 168/89   Pulse 88   Temp 98 °F (36.7 °C)   Resp 16   Ht 5' 4\" (1.626 m)   Wt 145 lb (65.8 kg)   SpO2 97%   BMI 24.89 kg/m²   Temp (24hrs), Av.2 °F (36.8 °C), Min:98 °F (36.7 °C), Max:98.4 °F (36.9 °C)    No results for input(s): POCGLU in the last 72 hours. Intake/Output Summary (Last 24 hours) at 2022 1546  Last data filed at 2022 1322  Gross per 24 hour   Intake --   Output 1000 ml   Net -1000 ml       Physical Exam  Vitals and nursing note reviewed. Constitutional:       General: He is sleeping. HENT:      Mouth/Throat:      Mouth: Mucous membranes are dry. Eyes:      Conjunctiva/sclera: Conjunctivae normal.   Cardiovascular:      Rate and Rhythm: Normal rate and regular rhythm. Pulses: Normal pulses. Heart sounds: Normal heart sounds. Pulmonary:      Effort: Pulmonary effort is normal.      Breath sounds: Normal breath sounds. Abdominal:      General: Bowel sounds are normal.      Palpations: Abdomen is soft. Musculoskeletal:         General: Normal range of motion. Skin:     General: Skin is warm and dry.       Capillary Refill: Capillary refill takes less than 2 seconds. Neurological:      General: No focal deficit present. Mental Status: He is easily aroused. Comments:  Follows commands but drifts back to sleep  Moves all extremities       Investigations:      Laboratory Testing:  Recent Results (from the past 24 hour(s))   CBC with Auto Differential    Collection Time: 12/04/22 11:00 AM   Result Value Ref Range    WBC 19.7 (H) 3.5 - 11.3 k/uL    RBC 5.24 4.21 - 5.77 m/uL    Hemoglobin 15.3 13.0 - 17.0 g/dL    Hematocrit 46.9 40.7 - 50.3 %    MCV 89.5 82.6 - 102.9 fL    MCH 29.2 25.2 - 33.5 pg    MCHC 32.6 28.4 - 34.8 g/dL    RDW 15.3 (H) 11.8 - 14.4 %    Platelets 329 043 - 696 k/uL    MPV 8.3 8.1 - 13.5 fL    NRBC Automated 0.0 0.0 per 100 WBC    Seg Neutrophils 91 (H) 36 - 66 %    Lymphocytes 2 (L) 24 - 44 %    Monocytes 7 1 - 7 %    Eosinophils % 0 (L) 1 - 4 %    Basophils 0 %    Immature Granulocytes 0 0 %    Segs Absolute 17.93 (H) 1.8 - 7.7 k/uL    Absolute Lymph # 0.39 (L) 1.0 - 4.8 k/uL    Absolute Mono # 1.38 (H) 0.2 - 0.8 k/uL    Absolute Eos # 0.00 0.0 - 0.4 k/uL    Basophils Absolute 0.00 0.0 - 0.2 k/uL    Absolute Immature Granulocyte 0.00 0.00 - 0.30 k/uL   BMP    Collection Time: 12/04/22 11:00 AM   Result Value Ref Range    Glucose 165 (H) 70 - 99 mg/dL    BUN 44 (H) 6 - 20 mg/dL    Creatinine 1.66 (H) 0.70 - 1.20 mg/dL    Est, Glom Filt Rate 47 (L) >60 mL/min/1.73m2    Bun/Cre Ratio 27 (H) 9 - 20    Calcium 10.3 8.6 - 10.4 mg/dL    Sodium 140 135 - 144 mmol/L    Potassium 4.8 3.7 - 5.3 mmol/L    Chloride 103 98 - 107 mmol/L    CO2 24 20 - 31 mmol/L    Anion Gap 13 9 - 17 mmol/L   Urinalysis with Microscopic    Collection Time: 12/04/22 12:15 PM   Result Value Ref Range    Color, UA Yellow Yellow    Turbidity UA Cloudy (A) Clear    Glucose, Ur NEGATIVE NEGATIVE    Bilirubin Urine NEGATIVE NEGATIVE    Ketones, Urine NEGATIVE NEGATIVE    Specific Gravity, UA 1.015 1.005 - 1.030    Urine Hgb 3+ (A) NEGATIVE    pH, UA 8.5 (H) 5.0 - 8.0    Protein, UA 3+ (A) NEGATIVE    Urobilinogen, Urine Normal Normal    Nitrite, Urine NEGATIVE NEGATIVE    Leukocyte Esterase, Urine LARGE (A) NEGATIVE    WBC, UA TOO NUMEROUS TO COUNT 0 - 5 /HPF    RBC, UA 20 TO 50 0 - 2 /HPF    Epithelial Cells UA 0 TO 2 0 - 5 /HPF    Amorphous, UA 4+ (A) None   Lactate, Sepsis    Collection Time: 12/04/22  1:09 PM   Result Value Ref Range    Lactic Acid, Sepsis 1.0 0.5 - 1.9 mmol/L       Imaging/Diagnostics:  CT ABDOMEN PELVIS WO CONTRAST Additional Contrast? None    Result Date: 12/4/2022  1. Urinary bladder distension with some wall thickening and scattered wall calcification. Calcification most pronounced from 6 to 9 o'clock position in the axial plane where there is also asymmetric soft tissue wall thickening at around the 9 o'clock position. Bladder malignancy is not excluded. Correlation with cystoscopy advised. 2. There is perivesical fat stranding and free fluid in the pelvis and free fluid elsewhere around the liver. Difficult to determine if the perivesical fat stranding is due to bladder inflammation or due to the free fluid. Nonetheless concern for cystitis. 3. Moderate bilateral hydronephrosis and hydroureter attributed to obstruction at the level of the urinary bladder and potentially bladder outlet obstruction. 4. Urinary bladder catheterized. Gas pockets in the bladder attributed gastritis a shin. 5. Bilateral inguinal hernia. RECOMMENDATIONS: Urology consultation. Assessment :      Hospital Problems             Last Modified POA    * (Principal) Acute cystitis with hematuria 12/4/2022 Yes    Benign prostatic hyperplasia with urinary retention 12/4/2022 Yes    Overview Signed 12/4/2022  2:35 PM by JOSEPH Kirkpatrick - CNP     Last Assessment & Plan:   Formatting of this note might be different from the original.  Flomax prescription sent. Patient will have subsequent void trial in the next 2 weeks.   If patient fails this is willing to consider clean intermittent catheterization. Acute kidney injury superimposed on chronic kidney disease ([facility] [address] 12/4/2022 Yes    Hydronephrosis 12/4/2022 Yes    Abnormal CT of the abdomen 12/4/2022 Yes    Bladder outlet obstruction 12/4/2022 Yes       Plan:     Patient status inpatient in the  Med/Surge    Acute cystitis with hematuria   Urine culture and blood cultures pending  Continue fluids and Rocephin    Abnormal CT concerning for possible renal mass, outlet obstruction and hydronephrosis  Urology consulted  Mechanical DVT prophylaxis only for now  Clear liquid diet    BPH with urinary retention  Urology following  Maintain Garcia catheter    YOAN superimposed on CKD  Monitor I&O  Continue IV hydration  Avoid hypo and hypertension (no reported h/o hypertension)  Avoid nephrotoxic agents  Monitor kidney function      Consultations:   IP CONSULT TO UROLOGY  IP CONSULT TO INTERNAL MEDICINE    Patient is admitted as inpatient status because of co-morbidities listed above, severity of signs and symptoms as outlined, requirement for current medical therapies and most importantly because of direct risk to patient if care not provided in a hospital setting. Expected length of stay > 48 hours.     JOSEPH Barbosa - CNP  12/4/2022  3:46 PM    Copy sent to Dr. Leidy Ellington MD

## 2022-12-05 PROBLEM — R78.81 GRAM-NEGATIVE BACTEREMIA: Status: ACTIVE | Noted: 2022-12-05

## 2022-12-05 PROBLEM — R31.9 URINARY TRACT INFECTION WITH HEMATURIA: Status: ACTIVE | Noted: 2022-12-05

## 2022-12-05 PROBLEM — R33.9 RETENTION OF URINE: Status: ACTIVE | Noted: 2022-12-05

## 2022-12-05 PROBLEM — N39.0 URINARY TRACT INFECTION WITH HEMATURIA: Status: ACTIVE | Noted: 2022-12-05

## 2022-12-05 PROBLEM — A41.59 SEPSIS DUE TO PROTEUS SPECIES (HCC): Status: ACTIVE | Noted: 2022-12-05

## 2022-12-05 LAB
ABSOLUTE EOS #: 0.16 K/UL (ref 0–0.44)
ABSOLUTE IMMATURE GRANULOCYTE: 0.06 K/UL (ref 0–0.3)
ABSOLUTE LYMPH #: 0.76 K/UL (ref 1.1–3.7)
ABSOLUTE MONO #: 0.82 K/UL (ref 0.1–1.2)
ANION GAP SERPL CALCULATED.3IONS-SCNC: 8 MMOL/L (ref 9–17)
BASOPHILS # BLD: 0 % (ref 0–2)
BASOPHILS ABSOLUTE: 0.06 K/UL (ref 0–0.2)
BUN BLDV-MCNC: 28 MG/DL (ref 6–20)
BUN/CREAT BLD: 30 (ref 9–20)
CALCIUM SERPL-MCNC: 8.6 MG/DL (ref 8.6–10.4)
CHLORIDE BLD-SCNC: 110 MMOL/L (ref 98–107)
CO2: 25 MMOL/L (ref 20–31)
CREAT SERPL-MCNC: 0.94 MG/DL (ref 0.7–1.2)
CULTURE: ABNORMAL
EOSINOPHILS RELATIVE PERCENT: 1 % (ref 1–4)
GFR SERPL CREATININE-BSD FRML MDRD: >60 ML/MIN/1.73M2
GLUCOSE BLD-MCNC: 127 MG/DL (ref 70–99)
HCT VFR BLD CALC: 39.1 % (ref 40.7–50.3)
HEMOGLOBIN: 12.6 G/DL (ref 13–17)
IMMATURE GRANULOCYTES: 0 %
INR BLD: 1.3
LYMPHOCYTES # BLD: 6 % (ref 24–43)
MCH RBC QN AUTO: 29.2 PG (ref 25.2–33.5)
MCHC RBC AUTO-ENTMCNC: 32.2 G/DL (ref 28.4–34.8)
MCV RBC AUTO: 90.7 FL (ref 82.6–102.9)
MONOCYTES # BLD: 6 % (ref 3–12)
NRBC AUTOMATED: 0 PER 100 WBC
PDW BLD-RTO: 16 % (ref 11.8–14.4)
PLATELET # BLD: 188 K/UL (ref 138–453)
PMV BLD AUTO: 8.6 FL (ref 8.1–13.5)
POTASSIUM SERPL-SCNC: 4.1 MMOL/L (ref 3.7–5.3)
PROTHROMBIN TIME: 15.9 SEC (ref 11.5–14.2)
RBC # BLD: 4.31 M/UL (ref 4.21–5.77)
RBC # BLD: ABNORMAL 10*6/UL
SEG NEUTROPHILS: 86 % (ref 36–65)
SEGMENTED NEUTROPHILS ABSOLUTE COUNT: 11.75 K/UL (ref 1.5–8.1)
SODIUM BLD-SCNC: 143 MMOL/L (ref 135–144)
SPECIMEN DESCRIPTION: ABNORMAL
WBC # BLD: 13.6 K/UL (ref 3.5–11.3)

## 2022-12-05 PROCEDURE — 36415 COLL VENOUS BLD VENIPUNCTURE: CPT

## 2022-12-05 PROCEDURE — 85610 PROTHROMBIN TIME: CPT

## 2022-12-05 PROCEDURE — 6360000002 HC RX W HCPCS: Performed by: INTERNAL MEDICINE

## 2022-12-05 PROCEDURE — 80048 BASIC METABOLIC PNL TOTAL CA: CPT

## 2022-12-05 PROCEDURE — 6370000000 HC RX 637 (ALT 250 FOR IP): Performed by: INTERNAL MEDICINE

## 2022-12-05 PROCEDURE — 2580000003 HC RX 258: Performed by: INTERNAL MEDICINE

## 2022-12-05 PROCEDURE — 99254 IP/OBS CNSLTJ NEW/EST MOD 60: CPT | Performed by: INTERNAL MEDICINE

## 2022-12-05 PROCEDURE — 6370000000 HC RX 637 (ALT 250 FOR IP): Performed by: NURSE PRACTITIONER

## 2022-12-05 PROCEDURE — 85025 COMPLETE CBC W/AUTO DIFF WBC: CPT

## 2022-12-05 PROCEDURE — 99232 SBSQ HOSP IP/OBS MODERATE 35: CPT | Performed by: INTERNAL MEDICINE

## 2022-12-05 PROCEDURE — 1200000000 HC SEMI PRIVATE

## 2022-12-05 PROCEDURE — 2580000003 HC RX 258: Performed by: NURSE PRACTITIONER

## 2022-12-05 RX ORDER — TAMSULOSIN HYDROCHLORIDE 0.4 MG/1
0.4 CAPSULE ORAL DAILY
Status: DISCONTINUED | OUTPATIENT
Start: 2022-12-05 | End: 2022-12-06 | Stop reason: HOSPADM

## 2022-12-05 RX ORDER — AZELASTINE 1 MG/ML
2 SPRAY, METERED NASAL 2 TIMES DAILY
COMMUNITY
Start: 2022-10-03

## 2022-12-05 RX ORDER — TAMSULOSIN HYDROCHLORIDE 0.4 MG/1
0.4 CAPSULE ORAL DAILY
COMMUNITY
Start: 2022-10-03

## 2022-12-05 RX ORDER — GABAPENTIN 300 MG/1
300 CAPSULE ORAL 3 TIMES DAILY
COMMUNITY

## 2022-12-05 RX ADMIN — ACETAMINOPHEN 650 MG: 325 TABLET ORAL at 19:59

## 2022-12-05 RX ADMIN — SODIUM CHLORIDE: 9 INJECTION, SOLUTION INTRAVENOUS at 08:03

## 2022-12-05 RX ADMIN — CEFTRIAXONE 2000 MG: 2 INJECTION, POWDER, FOR SOLUTION INTRAMUSCULAR; INTRAVENOUS at 12:19

## 2022-12-05 RX ADMIN — TAMSULOSIN HYDROCHLORIDE 0.4 MG: 0.4 CAPSULE ORAL at 12:13

## 2022-12-05 ASSESSMENT — PAIN DESCRIPTION - FREQUENCY: FREQUENCY: CONTINUOUS

## 2022-12-05 ASSESSMENT — PAIN DESCRIPTION - ONSET: ONSET: ON-GOING

## 2022-12-05 ASSESSMENT — PAIN DESCRIPTION - PAIN TYPE: TYPE: ACUTE PAIN

## 2022-12-05 ASSESSMENT — PAIN DESCRIPTION - ORIENTATION: ORIENTATION: RIGHT

## 2022-12-05 ASSESSMENT — PAIN SCALES - GENERAL: PAINLEVEL_OUTOF10: 3

## 2022-12-05 ASSESSMENT — PAIN DESCRIPTION - DESCRIPTORS: DESCRIPTORS: ACHING;DISCOMFORT

## 2022-12-05 ASSESSMENT — PAIN DESCRIPTION - LOCATION: LOCATION: KNEE

## 2022-12-05 ASSESSMENT — PAIN - FUNCTIONAL ASSESSMENT: PAIN_FUNCTIONAL_ASSESSMENT: PREVENTS OR INTERFERES SOME ACTIVE ACTIVITIES AND ADLS

## 2022-12-05 NOTE — PROGRESS NOTES
Pt admitted to room 2021 per bed  in stable condition from ER. Oriented to room and surroundings. Bed in lowest position, wheels locked, 2/4 side rails up. Call light in reach, room free of clutter, adequate lighting provided. Denies any further questions at this time. Instructed to call out with any questions/concerns/new onset of pain and/or n/v. White board updated. Continue to monitor with hourly rounding. Bed alarm on.

## 2022-12-05 NOTE — CONSULTS
[facility]  Urology Consultation    Patient:  Evan Cervantes  MRN: [id number]  YOB: 1965    CHIEF COMPLAINT:  urinary tract infection, indwelling Garcia    HISTORY OF PRESENT ILLNESS:   The patient is a 59 y.o. male who presents with symptoms as mentioned above, the patient follows up with [address] [place] [address] urology where he was seen previously with the plan for cystoscopy and further urologic care the patient has not returned yet for follow-up for further management. by Dr. Andrew Calzada    Presently being treated for gram-negative bacteremia associated with UTI an indwelling Garcia    Patient's old records, notes and chart reviewed and summarized above.     Past Medical History:    Past Medical History:   Diagnosis Date    BPH with obstruction/lower urinary tract symptoms     Chronic ear infection     Renal failure        Past Surgical History:    Past Surgical History:   Procedure Laterality Date    APPENDECTOMY      KNEE SURGERY      left     Previous  surgery:  patient scheduled for cystoscopy and further urologic care                                         Patient was seen in September and October by his urologist  Medications:    Scheduled Meds:   cefTRIAXone (ROCEPHIN) IV  2,000 mg IntraVENous Q24H    tamsulosin  0.4 mg Oral Daily    sodium chloride flush  5-40 mL IntraVENous 2 times per day     Continuous Infusions:   sodium chloride       PRN Meds:.sodium chloride flush, sodium chloride, ondansetron **OR** ondansetron, acetaminophen **OR** acetaminophen, polyethylene glycol    Allergies:  Sulfa antibiotics and Amoxicillin    Social History:    Social History     Socioeconomic History    Marital status:      Spouse name: Not on file    Number of children: Not on file    Years of education: Not on file    Highest education level: Not on file   Occupational History    Not on file   Tobacco Use    Smoking status: Some Days     Packs/day: 0.50     Years: 35.00     Pack years: 17.50     Types: Cigarettes    Smokeless tobacco: Never   Substance and Sexual Activity    Alcohol use: No    Drug use: No    Sexual activity: Not on file   Other Topics Concern    Not on file   Social History Narrative    Not on file     Social Determinants of Health     Financial Resource Strain: Not on file   Food Insecurity: Not on file   Transportation Needs: Not on file   Physical Activity: Not on file   Stress: Not on file   Social Connections: Not on file   Intimate Partner Violence: Not on file   Housing Stability: Not on file       Family History:    Family History   Problem Relation Age of Onset    High Blood Pressure Mother     Lung Cancer Mother     Stomach Cancer Father     Pancreatic Cancer Paternal Grandmother     Pancreatic Cancer Other      Previous Urologic Family history: none    REVIEW OF SYSTEMS:  Constitutional: negative  Eyes: negative  Respiratory: negative  Cardiovascular: negative  Gastrointestinal: negative  Genitourinary: see HPI  Musculoskeletal: negative  Skin: negative   Neurological: negative  Hematological/Lymphatic: negative  Psychological: negative    Physical Exam:    This a 59 y.o. male   Patient Vitals for the past 24 hrs:   BP Temp Temp src Pulse Resp SpO2 Weight   12/05/22 1117 107/67 98.6 °F (37 °C) -- 76 16 97 % --   12/05/22 0728 (!) 120/108 98.4 °F (36.9 °C) -- 81 16 97 % --   12/05/22 0450 -- -- -- -- -- -- 145 lb 9.6 oz (66 kg)   12/04/22 1959 123/66 98.2 °F (36.8 °C) Oral 87 18 95 % --   12/04/22 1724 (!) 148/90 97.9 °F (36.6 °C) Oral 78 16 98 % --   12/04/22 1302 (!) 168/89 98 °F (36.7 °C) -- 88 16 97 % --     Constitutional: Patient in no acute distress; Neuro: alert and oriented to person place and time. Psych: Mood and affect normal.  Skin: Normal  Lungs: Respiratory effort normal  Cardiovascular:  Normal peripheral pulses  Abdomen: Soft, non-tender, non-distended with no CVA, flank pain, hepatosplenomegaly or hernia.   Kidneys normal.  Bladder non-tender and not distended.   Lymphatics: no palpable lymphadenopathy  Penis normal and circumcised  Urethral meatus normal  Scrotal exam normal  Testicles normal bilaterally  Garcia catheter in place urine clear  LABS:  Recent Labs     12/04/22  1100 12/05/22  0640   WBC 19.7* 13.6*   HGB 15.3 12.6*   HCT 46.9 39.1*   MCV 89.5 90.7    188     Recent Labs     12/04/22  1100 12/05/22  0640    143   K 4.8 4.1    110*   CO2 24 25   BUN 44* 28*   CREATININE 1.66* 0.94     Lab Results   Component Value Date    PSA 3.99 12/16/2019       Additional Lab/culture results:    Urinalysis:   Recent Labs     12/04/22  1215   COLORU Yellow   PHUR 8.5*   WBCUA TOO NUMEROUS TO COUNT   RBCUA 20 TO 50   SPECGRAV 1.015   LEUKOCYTESUR LARGE*   UROBILINOGEN Normal   BILIRUBINUR NEGATIVE        -----------------------------------------------------------------  Imaging Results:    Assessment and Plan   Impression:  patient with enlarged prostate, urinary retention, indwelling Garcia, urinary tract infections  Patient Active Problem List   Diagnosis    Benign prostatic hyperplasia with urinary retention    Nephrolithiasis    Acute kidney injury superimposed on chronic kidney disease (HCC)    Hydronephrosis    Abnormal CT of the abdomen    Bladder outlet obstruction    Acute cystitis with hematuria    Sepsis due to Proteus species [facility])       Plan: discussed with the patient further urologic care as outlined by his urologist,  this will be initiated after completing the course of antibiotic therapy    We will make arrangements  with his urologist    Jefry Ansari MD  12:14 PM 12/5/2022

## 2022-12-05 NOTE — PROGRESS NOTES
[facility]  Office: [address], DO, Carmelo Aguilar, DO, Vivienne Oliveros, DO, Lydia Trotter Blood, DO, Jono Soares MD, Francisco Javier Cherry MD, Johanna Yeung MD, Janes Arzate MD,  Mikala Barrios MD, Blaise Pimentel MD, Emmanuel Shaw, DO, Mila Goel MD,  David Quintero MD, Johnathan Hussein MD, Tan Gerardo DO, Hossein Pa MD, Juancarlos Cee MD, Carol Balbuena DO, Austyn Mathis MD, Shania Roper MD, Fransico Boykin MD, Chad Branch MD, Tommy Christian DO, Fannie Patel MD, Angi Castellon MD, Essence Ann, CNP, Miesha Vazquez, CNP, Mojgan Castellon, CNP,  Rosa Boucher, [facility], Glen Clayton, CNP, Lori Gonzalez, CNP, Bisi Kidd, CNP, Deisy Galvez, CNP, Suzi Echeverria, CNP, Ana Dick, PA-C, Beverly Reyes, CNS, Tushar Monteiro, CNP, Anastasiya Prom, [facility]    Progress Note    12/5/2022    9:32 AM    Name:   Susannah Dumas  MRN:     [id number]     Acct:      [de-identified]   Room:   2021/2021-01  IP Day:  1  Admit Date:  12/4/2022 10:36 AM    PCP:   Yunier Ma MD  Code Status:  Full Code    Subjective:     C/C:   Chief Complaint   Patient presents with    Urinary Retention     Pt states he has a catheter in and it came out of position this AM      Interval History Status: not changed. No acute events overnight. Patient feels better now that Garcia catheter is in place. Creatinine is improved  White blood cell count is downtrending    Brief History: This is a 47-year-old male who initially presented to the hospital for complaints of abdominal pain found to have bilateral hydronephrosis and urinary obstruction along with Proteus bacteremia/urinary tract infection.   He was admitted started on IV antibiotics and seen by ID/urology who recommended:  Review of Systems:     Constitutional:  negative for chills, fevers, sweats  Respiratory:  negative for cough, dyspnea Labs:  Hematology:  Recent Labs     12/04/22  1100 12/05/22  0640   WBC 19.7* 13.6*   RBC 5.24 4.31   HGB 15.3 12.6*   HCT 46.9 39.1*   MCV 89.5 90.7   MCH 29.2 29.2   MCHC 32.6 32.2   RDW 15.3* 16.0*    188   MPV 8.3 8.6   INR  --  1.3     Chemistry:  Recent Labs     12/04/22  1100 12/05/22  0640    143   K 4.8 4.1    110*   CO2 24 25   GLUCOSE 165* 127*   BUN 44* 28*   CREATININE 1.66* 0.94   ANIONGAP 13 8*   LABGLOM 47* >60   CALCIUM 10.3 8.6   No results for input(s): PROT, LABALBU, LABA1C, P6APVHA, Q1ZLDJO, FT4, TSH, AST, ALT, LDH, GGT, ALKPHOS, LABGGT, BILITOT, BILIDIR, AMMONIA, AMYLASE, LIPASE, LACTATE, CHOL, HDL, LDLCHOLESTEROL, CHOLHDLRATIO, TRIG, VLDL, TQE01YK, PHENYTOIN, PHENYF, URICACID, POCGLU in the last 72 hours. ABG:No results found for: POCPH, PHART, PH, POCPCO2, JMF3LIU, PCO2, POCPO2, PO2ART, PO2, POCHCO3, DWX1MDI, HCO3, NBEA, PBEA, BEART, BE, THGBART, THB, GHG7KZK, KJCD3BQI, V0SBJUGU, O2SAT, FIO2  Lab Results   Component Value Date/Time    SPECIAL LFA,12ML 12/04/2022 01:07 PM     Lab Results   Component Value Date/Time    CULTURE POSITIVE Blood Culture (A) 12/04/2022 01:07 PM    CULTURE DIRECT GRAM STAIN FROM BOTTLE: GRAM NEGATIVE RODS 12/04/2022 01:07 PM    CULTURE  12/04/2022 01:07 PM     Proteus species Detected: Methodology- Polymerase Chain Reaction (PCR)    CULTURE  12/04/2022 01:07 PM     (NOTE) Direct Gram Stain from bottle and Polymerase Chain Reaction (PCR) results called to and read back by: SHIRA BARFIELD AT 0532 ON 12/5/22. Radiology:  CT ABDOMEN PELVIS WO CONTRAST Additional Contrast? None    Addendum Date: 12/4/2022    ADDENDUM: Correction of voice transcription error in the impression. IMPRESSION 4 should read as follows: Urinary bladder catheterized. Gas pockets in the bladder attributed to catheterization. Result Date: 12/4/2022  1. Urinary bladder distension with some wall thickening and scattered wall calcification.   Calcification most pronounced from 6 to 9 o'clock position in the axial plane where there is also asymmetric soft tissue wall thickening at around the 9 o'clock position. Bladder malignancy is not excluded. Correlation with cystoscopy advised. 2. There is perivesical fat stranding and free fluid in the pelvis and free fluid elsewhere around the liver. Difficult to determine if the perivesical fat stranding is due to bladder inflammation or due to the free fluid. Nonetheless concern for cystitis. 3. Moderate bilateral hydronephrosis and hydroureter attributed to obstruction at the level of the urinary bladder and potentially bladder outlet obstruction. 4. Urinary bladder catheterized. Gas pockets in the bladder attributed gastritis a shin. 5. Bilateral inguinal hernia. RECOMMENDATIONS: Urology consultation. Physical Examination:        General appearance:  alert, cooperative and no distress  Mental Status:  oriented to person, place and time and normal affect  Lungs:  clear to auscultation bilaterally, normal effort  Heart:  regular rate and rhythm, no murmur  Abdomen:  soft, nontender, nondistended, normal bowel sounds, no masses, hepatomegaly, splenomegaly  Extremities:  no edema, redness, tenderness in the calves  Skin:  no gross lesions, rashes, induration  : Garcia catheter in place    Assessment:        Hospital Problems             Last Modified POA    * (Principal) Sepsis due to Proteus species ([facility] [address] 12/5/2022 Yes    Benign prostatic hyperplasia with urinary retention 12/4/2022 Yes    Overview Signed 12/4/2022  2:35 PM by JOSEPH Collier CNP     Last Assessment & Plan:   Formatting of this note might be different from the original.  Flomax prescription sent. Patient will have subsequent void trial in the next 2 weeks. If patient fails this is willing to consider clean intermittent catheterization.          Acute kidney injury superimposed on chronic kidney disease ([facility] [address] 12/4/2022 Yes    Hydronephrosis 12/4/2022 Yes Abnormal CT of the abdomen 12/4/2022 Yes    Bladder outlet obstruction 12/4/2022 Yes    Acute cystitis with hematuria 12/5/2022 Yes       Plan:        Gram negative Sepsis due to Proteus bacteremia from  UTI with bilateral hydronephrosis and hydroureter due to bladder outlet obstruction: SIRS 2/4. Blood culture, urine culture positive. Urology consulted for cystoscopy. Continue Garcia catheter, strict I/O's. Increase  Rocephin dose to 2 grams. Consult ID. Bladder calcification seen on CT: r/o malignancy. BPH: start flomax  Elevated blood pressure reading: no history of HTN. Monitor BP. YOAN: resolved.    PTOT  Labs, imaging, EKG reviewed  Med rec done, reviewed in [facility],   12/5/2022  9:32 AM

## 2022-12-05 NOTE — CONSULTS
[facility]  Initial Consult Note  Date: 12/5/2022    Hospital day :1     Impression:   Gram-negative bacteremia likely related to a urinary tract infection  Chronic obstructive uropathy secondary to BPH with an indwelling Garcia catheter  Bilateral hydronephrosis    Recommendations   Continue intravenous antimicrobial therapy with Rocephin  [facility]ult[facility] does show Proteus as well as E. coli isolated  We will follow the culture data and sensitivities and at adjust antimicrobial therapy accordingly  The obstructive process is chronic and the patient follows with urology in the [address] [place] [address]    Chief complaint/reason for consultation:   Gram-negative bacteremia    History of Present Illness:   Nola Hernandez is a 59y.o.-year-old male who was initially admitted on 12/4/2022. Riky Ruth has a history of BPH with urinary retention and has a chronic indwelling Garcia catheter. The patient has had multiple voiding trials which have failed and also has a history of nephrolithiasis. Patient has had multiple visits for the catheter being dislodged causing urinary retention and he reports that on Sunday that his catheter came out a little bit and subsequently he developed urinary retention and this catheter has not been draining. He had some lower abdominal discomfort but was not having any fevers or chills and in the emergency department a CT of the abdomen pelvis was done that showed urinary bladder distention with some wall thickening and scattered wall calcification. There is Julio vesicle fat stranding and free fluid in the pelvis and free fluid around the liver. There is moderate bilateral hydronephrosis and hydroureter attributed to obstruction at the level of the urinary bladder and potentially bladder outlet obstruction. Gas pockets in the bladder after inguinal hernias.     The patient was admitted for acute cystitis as well as bladder outlet obstruction and concern for renal antibiotics and Amoxicillin     Review of Systems:   General: No fevers or chills. Eyes: No double vision or blurry vision. ENT: No sore throat or runny nose. Cardiovascular: No chest pain or palpitations. Lung: No shortness of breath or cough. Abdomen:  Previous abdominal pain with urinary obstruction and decreased urine output  Genitourinary: No increased urinary frequency, or dysuria. Musculoskeletal: No muscle aches or pains. Hematologic: No bleeding or bruising. Neurologic: No headache, weakness, numbness, or tingling. Physical Examination :   BP (!) 120/108   Pulse 81   Temp 98.4 °F (36.9 °C)   Resp 16   Ht 5' 4\" (1.626 m)   Wt 145 lb 9.6 oz (66 kg)   SpO2 97%   BMI 24.99 kg/m²     Temperature Range: Temp: 98.4 °F (36.9 °C) Temp  Av.2 °F (36.8 °C)  Min: 97.9 °F (36.6 °C)  Max: 98.4 °F (36.9 °C)  General Appearance: Awake, alert, and in no apparent distress  Head: Normocephalic, without obvious abnormality, atraumatic  Eyes: Pupils equal, round, reactive, to light and accommodation; extraocular movements intact; sclera anicteric; conjunctivae pink  ENT: Oropharynx clear, without erythema, exudate, or thrush. Neck: Supple, without lymphadenopathy. Pulmonary/Chest: Clear to auscultation, without wheezes, rales, or rhonchi  Cardiovascular: Regular rate and rhythm without murmurs, rubs, or gallops. Abdomen: Soft, nontender, nondistended. Extremities: No cyanosis, clubbing, edema, or effusions. Neurologic: No gross sensory or motor deficits. Skin: Warm and dry with no rash.     Medical Decision Making:   I have independently reviewed/ordered the following labs:  CBC with Differential:   Recent Labs     22  1100 22  0640   WBC 19.7* 13.6*   HGB 15.3 12.6*   HCT 46.9 39.1*    188   LYMPHOPCT 2* 6*   MONOPCT 7 6     BMP:   Recent Labs     22  1100 22  0640    143   K 4.8 4.1    110*   CO2 24 25   BUN 44* 28*   CREATININE 1.66* 0.94     Hepatic lesion is evident. There is layering calcification across the dependent portion of the urinary bladder and also extending along the right wall to the 9 o'clock position. Probably combination of intraluminal and intramural calcification. There is also asymmetric soft tissue wall thickening along the posterior right side where there is calcification, series 2, image 125. Other small foci of bladder wall calcification noted on the left at about 3 o'clock position, series 2, image 125. There also some gas pockets in the urinary bladder which may be attributed to catheterization. Urinary bladder is distended. There is perivesical fat stranding. Cystitis is not excluded. GI/Bowel: There is limited evaluation due to absence of oral contrast. The stomach shows no focal lesions. Small bowel loops normal in caliber showing no focal abnormalities. No evidence for appendicitis. Evaluation of the colon shows no acute process. Pelvis: Fat stranding and free fluid in the pelvis centered around urinary bladder and prostate suggest infective process. Peritoneum/Retroperitoneum: There is small volume free intraperitoneal fluid. Fluid is seen around the liver and also in the pelvis as discussed above. No sizable lymphadenopathy. Bones/Soft Tissues: Bilateral fat containing inguinal hernia. Some of the free fluid extends into the inguinal canal.  Diffuse degenerative changes in the bones. 1. Urinary bladder distension with some wall thickening and scattered wall calcification. Calcification most pronounced from 6 to 9 o'clock position in the axial plane where there is also asymmetric soft tissue wall thickening at around the 9 o'clock position. Bladder malignancy is not excluded. Correlation with cystoscopy advised. 2. There is perivesical fat stranding and free fluid in the pelvis and free fluid elsewhere around the liver.   Difficult to determine if the perivesical fat stranding is due to bladder inflammation or due to the free fluid. Nonetheless concern for cystitis. 3. Moderate bilateral hydronephrosis and hydroureter attributed to obstruction at the level of the urinary bladder and potentially bladder outlet obstruction. 4. Urinary bladder catheterized. Gas pockets in the bladder attributed gastritis a shin. 5. Bilateral inguinal hernia. RECOMMENDATIONS: Urology consultation. Cultures:     Culture, Urine [[id number]] (Abnormal) Collected: 12/04/22 1215   Order Status: Completed Specimen: Urine, indwelling catheter Updated: 12/05/22 9314    Specimen Description . INDWELLING CATH URINE    Culture GRAM NEGATIVE RODS >232530 CFU/ML Abnormal    Blood Culture 1 [[id number]] (Abnormal) Collected: 12/04/22 1300   Order Status: Completed Specimen: Blood Updated: 12/05/22 0534    Specimen Description . BLOOD    Special Requests RFA,12ML    Culture POSITIVE Blood Culture Abnormal      DIRECT GRAM STAIN FROM BOTTLE: GRAM NEGATIVE RODS     (NOTE) Direct Gram Stain from bottle result called to and read back by: SHIRA BARFIELD AT J068 ON 12/5/22. Culture, Blood 2 [[id number]] (Abnormal) Collected: 12/04/22 1307   Order Status: Completed Specimen: Blood Updated: 12/05/22 0533    Specimen Description . BLOOD    Special Requests LFA,12ML    Culture POSITIVE Blood Culture Abnormal      DIRECT GRAM STAIN FROM BOTTLE: GRAM NEGATIVE RODS     Proteus species Detected: Methodology- Polymerase Chain Reaction (PCR)     (NOTE) Direct Gram Stain from bottle and Polymerase Chain Reaction (PCR) results called to and read back by: SHIRA BARFIELD AT 0532 ON 12/5/22.        Component 1 mo ago   Culture  >100,000 ORGANISMS/mL PROTEUS VULGARIS Abnormal     Culture  >100,000 ORGANISMS/mL ESCHERICHIA COLI Abnormal     Culture  ALONG WITH RARE NORMAL URO GENITAL PIA    Resulting Agency [facility]   Susceptibility    Organism Antibiotic Method Susceptibility   Proteus Vulgaris Ampicillin MARGARITO METHOD >=32: Resistant   Proteus Vulgaris AMP/SULBACTAM MARGARITO METHOD 8/4: Susceptible   Proteus Vulgaris Cefazolin MARGARITO METHOD >=64: Resistant   Proteus Vulgaris Ceftriaxone MARGARITO METHOD <=1: Susceptible   Proteus Vulgaris Ciprofloxacin MARGARITO METHOD <=0.25: Susceptible   Proteus Vulgaris Gentamicin MARGARITO METHOD 8: Intermediate   Proteus Vulgaris Levofloxacin MARGARITO METHOD <=0.12: Susceptible   Proteus Vulgaris Nitrofurantoin MARGARITO METHOD 128: Resistant   Proteus Vulgaris PIPERACIL/TAZOBACTAM MARGARITO METHOD <=4: Susceptible   Proteus Vulgaris Tobramycin MARGARITO METHOD 8: Intermediate   Proteus Vulgaris TRIMETH/SULFAMETHOXAZOLE MARGARITO METHOD <=1/19: Susceptible   Escherichia Coli Ampicillin MARGARITO METHOD <=2: Susceptible   Escherichia Coli AMP/SULBACTAM MARGARITO METHOD <=2/1: Susceptible   Escherichia Coli Cefazolin MARGARITO METHOD <=4: Susceptible   Escherichia Coli Ceftriaxone MARGARITO METHOD <=1: Susceptible   Escherichia Coli Ciprofloxacin MARGARITO METHOD <=0.25: Susceptible   Escherichia Coli Gentamicin MARGARITO METHOD <=1: Susceptible   Escherichia Coli Levofloxacin MARGARITO METHOD <=0.12: Susceptible   Escherichia Coli Nitrofurantoin MARGARITO METHOD <=16: Susceptible   Escherichia Coli PIPERACIL/TAZOBACTAM MARGARITO METHOD <=4: Susceptible   Escherichia Coli Tobramycin MARGARITO METHOD <=1: Susceptible   Escherichia Coli TRIMETH/SULFAMETHOXAZOLE MARGARITO METHOD <=1/19: Susceptible   Specimen Collected: 10/12/22 17:06 Last Resulted: 10/16/22 07:13   Received From: [address] [place] [address]  Result Received: 12/04/22 10:21     Thank you for allowing us to participate in the care of this patient. Please call with questions.     Electronically signed by Loraine Mooney MD on 12/5/2022 at 10:02 AM      [facility]  Loraine Mooney MD  [employer] messaging  OFFICE: [phone]      This note is created with the assistance of a speech recognition program.  While intending to generate a document that actually reflects the content of the visit, the document can still have some errors including those of syntax and sound a like substitutions which may escape proof reading. In such instances, actual meaning can be extrapolated by contextual diversion.

## 2022-12-05 NOTE — PROGRESS NOTES
Physician Progress Note      PATIENTMolisa Beverly  SAMINA #:                  [id number]  :                       1965  ADMIT DATE:       2022 10:36 AM  DISCH DATE:  RESPONDING  PROVIDER #:        Ailyn Murdock CNP        QUERY TEXT:    Stage of Chronic Kidney Disease: Please provide further specificity, if known. Clinical indicators include: dialysis, bun, creatinine, chronic kidney   disease, ckd  Options provided:  -- Chronic kidney disease stage 1  -- Chronic kidney disease stage 2  -- Chronic kidney disease stage 3  -- Chronic kidney disease stage 3a  -- Chronic kidney disease stage 3b  -- Chronic kidney disease stage 4  -- Chronic kidney disease stage 5  -- Chronic kidney disease stage 5, requiring dialysis  -- End stage renal disease  -- Other - I will add my own diagnosis  -- Disagree - Not applicable / Not valid  -- Disagree - Clinically Unable to determine / Unknown        PROVIDER RESPONSE TEXT:    The patient has chronic kidney disease stage 3.       Electronically signed by:  Isaac Murdock CNP 2022 6:45 PM

## 2022-12-05 NOTE — PLAN OF CARE
Problem: Pain  Goal: Verbalizes/displays adequate comfort level or baseline comfort level  Outcome: Progressing     Problem: Genitourinary - Adult  Goal: Urinary catheter remains patent  Outcome: Progressing     Problem: Neurosensory - Adult  Goal: Achieves stable or improved neurological status  Outcome: Progressing  Goal: Achieves maximal functionality and self care  Outcome: Progressing     Problem: Skin/Tissue Integrity - Adult  Goal: Skin integrity remains intact  Outcome: Progressing     Problem: Musculoskeletal - Adult  Goal: Return ADL status to a safe level of function  Outcome: Progressing

## 2022-12-05 NOTE — PLAN OF CARE
Problem: Discharge Planning  Goal: Discharge to home or other facility with appropriate resources  Outcome: Progressing  Flowsheets (Taken 12/4/2022 1935)  Discharge to home or other facility with appropriate resources:   Identify barriers to discharge with patient and caregiver   Arrange for needed discharge resources and transportation as appropriate   Identify discharge learning needs (meds, wound care, etc)     Problem: Pain  Goal: Verbalizes/displays adequate comfort level or baseline comfort level  Outcome: Progressing  Flowsheets (Taken 12/5/2022 0008)  Verbalizes/displays adequate comfort level or baseline comfort level:   Encourage patient to monitor pain and request assistance   Assess pain using appropriate pain scale   Administer analgesics based on type and severity of pain and evaluate response   Implement non-pharmacological measures as appropriate and evaluate response     Problem: Genitourinary - Adult  Goal: Urinary catheter remains patent  Outcome: Progressing  Flowsheets (Taken 12/5/2022 0008)  Urinary catheter remains patent: Assess patency of urinary catheter     Problem: Metabolic/Fluid and Electrolytes - Adult  Goal: Electrolytes maintained within normal limits  Outcome: Progressing  Flowsheets (Taken 12/5/2022 0008)  Electrolytes maintained within normal limits:   Monitor labs and assess patient for signs and symptoms of electrolyte imbalances   Administer electrolyte replacement as ordered   Monitor response to electrolyte replacements, including repeat lab results as appropriate     Problem: Metabolic/Fluid and Electrolytes - Adult  Goal: Hemodynamic stability and optimal renal function maintained  Outcome: Progressing  Flowsheets (Taken 12/5/2022 0008)  Hemodynamic stability and optimal renal function maintained:   Monitor labs and assess for signs and symptoms of volume excess or deficit   Monitor intake, output and patient weight   Monitor urine specific gravity, serum osmolarity and serum sodium as indicated or ordered   Monitor response to interventions for patient's volume status, including labs, urine output, blood pressure (other measures as available)     Problem: Metabolic/Fluid and Electrolytes - Adult  Goal: Glucose maintained within prescribed range  Outcome: Progressing  Flowsheets (Taken 12/5/2022 0008)  Glucose maintained within prescribed range:   Monitor blood glucose as ordered   Assess for signs and symptoms of hyperglycemia and hypoglycemia     Problem: Neurosensory - Adult  Goal: Achieves stable or improved neurological status  Outcome: Progressing  Flowsheets (Taken 12/5/2022 0008)  Achieves stable or improved neurological status: Assess for and report changes in neurological status     Problem: Neurosensory - Adult  Goal: Achieves maximal functionality and self care  Outcome: Progressing  Flowsheets (Taken 12/5/2022 0008)  Achieves maximal functionality and self care: Encourage and assist patient to increase activity and self care with guidance from physical therapy/occupational therapy     Problem: Cardiovascular - Adult  Goal: Absence of cardiac dysrhythmias or at baseline  Outcome: Progressing  Flowsheets (Taken 12/5/2022 0008)  Absence of cardiac dysrhythmias or at baseline: Monitor cardiac rate and rhythm     Problem: Skin/Tissue Integrity - Adult  Goal: Skin integrity remains intact  Outcome: Progressing  Flowsheets (Taken 12/5/2022 0008)  Skin Integrity Remains Intact:   Monitor for areas of redness and/or skin breakdown   Assess vascular access sites hourly     Problem: Musculoskeletal - Adult  Goal: Return ADL status to a safe level of function  Outcome: Progressing  Flowsheets (Taken 12/5/2022 0008)  Return ADL Status to a Safe Level of Function:   Administer medication as ordered   Assess activities of daily living deficits and provide assistive devices as needed   Obtain physical therapy/occupational therapy consults as needed   Assist and instruct patient to increase activity and self care as tolerated

## 2022-12-05 NOTE — ACP (ADVANCE CARE PLANNING)
Advance Care Planning     Advance Care Planning Activator (Inpatient)  Conversation Note      Date of ACP Conversation: 12/5/2022     Conversation Conducted with: Patient with Decision Making Capacity    ACP Activator: Priyanka Doe RN    {When Decision Maker makes decisions on behalf of the incapacitated patient: Decision Maker is asked to consider and make decisions based on patient values, known preferences, or best interests. Health Care Decision Maker:     Current Designated Health Care Decision Maker:     Primary Decision Maker: Dasia Matt - Child - [phone]  Click here to complete Healthcare Decision Makers including section of the Healthcare Decision Maker Relationship (ie \"Primary\")      Care Preferences    Ventilation: \"If you were in your present state of health and suddenly became very ill and were unable to breathe on your own, what would your preference be about the use of a ventilator (breathing machine) if it were available to you? \"      Would the patient desire the use of ventilator (breathing machine)?: yes    \"If your health worsens and it becomes clear that your chance of recovery is unlikely, what would your preference be about the use of a ventilator (breathing machine) if it were available to you? \"     Would the patient desire the use of ventilator (breathing machine)?: No      Resuscitation  \"CPR works best to restart the heart when there is a sudden event, like a heart attack, in someone who is otherwise healthy. Unfortunately, CPR does not typically restart the heart for people who have serious health conditions or who are very sick. \"    \"In the event your heart stopped as a result of an underlying serious health condition, would you want attempts to be made to restart your heart (answer \"yes\" for attempt to resuscitate) or would you prefer a natural death (answer \"no\" for do not attempt to resuscitate)? \" yes       [] Yes   [x] No   Educated Patient / Katrina Larios regarding differences between Advance Directives and portable DNR orders.     Length of ACP Conversation in minutes:  5    Conversation Outcomes:  [x] ACP discussion completed  [] Existing advance directive reviewed with patient; no changes to patient's previously recorded wishes  [] New Advance Directive completed  [] Portable Do Not Rescitate prepared for Provider review and signature  [] POLST/POST/MOLST/MOST prepared for Provider review and signature      Follow-up plan:    [] Schedule follow-up conversation to continue planning  [] Referred individual to Provider for additional questions/concerns   [] Advised patient/agent/surrogate to review completed ACP document and update if needed with changes in condition, patient preferences or care setting    [] This note routed to one or more involved healthcare providers

## 2022-12-06 VITALS
HEART RATE: 56 BPM | RESPIRATION RATE: 16 BRPM | OXYGEN SATURATION: 97 % | DIASTOLIC BLOOD PRESSURE: 69 MMHG | BODY MASS INDEX: 24.86 KG/M2 | SYSTOLIC BLOOD PRESSURE: 131 MMHG | HEIGHT: 64 IN | WEIGHT: 145.6 LBS | TEMPERATURE: 98.2 F

## 2022-12-06 PROBLEM — N39.0 URINARY TRACT INFECTION ASSOCIATED WITH INDWELLING URETHRAL CATHETER (HCC): Status: ACTIVE | Noted: 2022-12-04

## 2022-12-06 PROBLEM — B96.4 BACTEREMIA DUE TO PROTEUS SPECIES: Status: ACTIVE | Noted: 2022-12-05

## 2022-12-06 PROBLEM — T83.511A URINARY TRACT INFECTION ASSOCIATED WITH INDWELLING URETHRAL CATHETER (HCC): Status: ACTIVE | Noted: 2022-12-04

## 2022-12-06 LAB
CULTURE: ABNORMAL
Lab: ABNORMAL
Lab: ABNORMAL
SPECIMEN DESCRIPTION: ABNORMAL
SPECIMEN DESCRIPTION: ABNORMAL

## 2022-12-06 PROCEDURE — 99232 SBSQ HOSP IP/OBS MODERATE 35: CPT | Performed by: INTERNAL MEDICINE

## 2022-12-06 PROCEDURE — 2580000003 HC RX 258: Performed by: NURSE PRACTITIONER

## 2022-12-06 PROCEDURE — 6370000000 HC RX 637 (ALT 250 FOR IP): Performed by: INTERNAL MEDICINE

## 2022-12-06 PROCEDURE — 6360000002 HC RX W HCPCS: Performed by: INTERNAL MEDICINE

## 2022-12-06 PROCEDURE — 2580000003 HC RX 258: Performed by: INTERNAL MEDICINE

## 2022-12-06 RX ORDER — CEFDINIR 300 MG/1
300 CAPSULE ORAL 2 TIMES DAILY
Qty: 20 CAPSULE | Refills: 0 | Status: SHIPPED | OUTPATIENT
Start: 2022-12-06 | End: 2022-12-16

## 2022-12-06 RX ADMIN — TAMSULOSIN HYDROCHLORIDE 0.4 MG: 0.4 CAPSULE ORAL at 11:33

## 2022-12-06 RX ADMIN — SODIUM CHLORIDE, PRESERVATIVE FREE 10 ML: 5 INJECTION INTRAVENOUS at 11:33

## 2022-12-06 RX ADMIN — CEFTRIAXONE 2000 MG: 2 INJECTION, POWDER, FOR SOLUTION INTRAMUSCULAR; INTRAVENOUS at 14:01

## 2022-12-06 ASSESSMENT — ENCOUNTER SYMPTOMS
GASTROINTESTINAL NEGATIVE: 1
RESPIRATORY NEGATIVE: 1

## 2022-12-06 NOTE — PROGRESS NOTES
[facility]  Progress Note    Oliverio Harper  MRN: [id number]  Date: 12/6/2022  LOS: 2     Reason for F/U :   Proteus vulgaris bacteremia    Impression :   Proteus vulgaris bacteremia likely related to a urinary tract infection  Chronic obstructive uropathy secondary to BPH with an indwelling Garcia catheter  Bilateral hydronephrosis  Right-sided knee joint effusion    Recommendations: The patient has been on IV antimicrobial therapy with Rocephin  We discussed switching him to ciprofloxacin and he reported that \"ciprofloxacin does not work for his infections\"  We then discussed using Omnicef instead  The patient did report some right-sided knee pain and does have an effusion noted on evaluation and may benefit from a knee joint aspirate. Infection Control Recommendations:   Universal precautions    Discharge Planning:   Estimated Length of IV antimicrobials: While hospitalized  Patient will need Midline Catheter Insertion/ PICC line Insertion: No  Patient will need: Home IV , [place],  SNF,  LTAC: Undetermined  Patient willneed outpatient wound care: No    Medical Decision making / Summary of Stay:   Oliverio Harper is a 59y.o.-year-old male who was initially admitted on 12/4/2022. Arnulfo Plunkett has a history of BPH with urinary retention and has a chronic indwelling Garcia catheter. The patient has had multiple voiding trials which have failed and also has a history of nephrolithiasis. Patient has had multiple visits for the catheter being dislodged causing urinary retention and he reports that on Sunday that his catheter came out a little bit and subsequently he developed urinary retention and this catheter has not been draining. He had some lower abdominal discomfort but was not having any fevers or chills and in the emergency department a CT of the abdomen pelvis was done that showed urinary bladder distention with some wall thickening and scattered wall calcification.   There is Julio vesicle fat stranding and free fluid in the pelvis and free fluid around the liver. There is moderate bilateral hydronephrosis and hydroureter attributed to obstruction at the level of the urinary bladder and potentially bladder outlet obstruction. Gas pockets in the bladder after inguinal hernias. The patient was admitted for acute cystitis as well as bladder outlet obstruction and concern for renal mass. Blood cultures done have gram-negative rods and I was asked to evaluate and help with antibiotic choice. The patient does not report any subjective fevers, chills or sweats. Current evaluation:2022    /70   Pulse 59   Temp 98.1 °F (36.7 °C) (Oral)   Resp 16   Ht 5' 4\" (1.626 m)   Wt 145 lb 9.6 oz (66 kg)   SpO2 100%   BMI 24.99 kg/m²     Temperature Range: Temp: 98.1 °F (36.7 °C) Temp  Av.3 °F (36.8 °C)  Min: 98.1 °F (36.7 °C)  Max: 98.6 °F (37 °C)  Patient is seen and evaluated at bedside he is awake and alert in no acute distress. No subjective fevers or chills  No abdominal pain nausea vomiting or diarrhea. Reports pain in his right knee joint that is limiting his ability to ambulate. Review of Systems   Constitutional: Negative. HENT: Negative. Respiratory: Negative. Cardiovascular: Negative. Gastrointestinal: Negative. Genitourinary: Negative. Musculoskeletal:  Positive for joint swelling. Neurological: Negative. Psychiatric/Behavioral: Negative. Physical Examination :     Physical Exam  Constitutional:       Appearance: He is well-developed. HENT:      Head: Normocephalic and atraumatic. Cardiovascular:      Rate and Rhythm: Normal rate. Heart sounds: Normal heart sounds. No friction rub. No gallop. Pulmonary:      Effort: Pulmonary effort is normal.      Breath sounds: Normal breath sounds. No wheezing. Abdominal:      General: Bowel sounds are normal.      Palpations: Abdomen is soft. There is no mass. Tenderness:  There is no abdominal tenderness. Genitourinary:     Comments: Indwelling Garcia catheter  Musculoskeletal:         General: Swelling (Right knee joint swelling/effusion) present. Normal range of motion. Cervical back: Normal range of motion and neck supple. Lymphadenopathy:      Cervical: No cervical adenopathy. Skin:     General: Skin is warm and dry. Neurological:      Mental Status: He is alert and oriented to person, place, and time. Laboratory data:   I have independently reviewed the followinglabs:  CBC with Differential:   Recent Labs     12/04/22  1100 12/05/22  0640   WBC 19.7* 13.6*   HGB 15.3 12.6*   HCT 46.9 39.1*    188   LYMPHOPCT 2* 6*   MONOPCT 7 6     BMP:   Recent Labs     12/04/22  1100 12/05/22  0640    143   K 4.8 4.1    110*   CO2 24 25   BUN 44* 28*   CREATININE 1.66* 0.94     Hepatic Function Panel: No results for input(s): PROT, LABALBU, BILIDIR, IBILI, BILITOT, ALKPHOS, ALT, AST in the last 72 hours. No results found for: PROCAL  No results found for: CRP  No results found for: SEDRATE      No results found for: DDIMER  No results found for: FERRITIN  No results found for: LDH  No results found for: FIBRINOGEN    No results found for requested labs within last 30 days. No results found for: COVID19    No results for input(s): VANCOTROUGH in the last 72 hours. Imaging Studies:   No new imaging    Cultures:     Culture, Blood 2 [[id number]] (Abnormal)  Collected: 12/04/22 1307   Order Status: Completed Specimen: Blood Updated: 12/06/22 0903    Specimen Description . BLOOD    Special Requests LFA,12ML    Culture POSITIVE Blood Culture Abnormal      DIRECT GRAM STAIN FROM BOTTLE: GRAM NEGATIVE RODS     Proteus species Detected: Methodology- Polymerase Chain Reaction (PCR)     PROTEUS VULGARIS Abnormal      (NOTE) Direct Gram Stain from bottle and Polymerase Chain Reaction (PCR) results called to and read back by: SHIRA BARFIELD AT 0532 ON 12/5/22.    Blood Culture 1 [[id number]] (Abnormal) Collected: 12/04/22 1300   Order Status: Completed Specimen: Blood Updated: 12/06/22 0902    Specimen Description . BLOOD    Special Requests RFA,12ML    Culture POSITIVE Blood Culture Abnormal      DIRECT GRAM STAIN FROM BOTTLE: GRAM NEGATIVE RODS     PROTEUS VULGARIS Abnormal      For susceptibility, refer to previous culture. (NOTE) Direct Gram Stain from bottle result called to and read back by: Chidi Metz ON 12/5/22. Proteus vulgaris (4)    Antibiotic Interpretation Microscan Method Status    ampicillin Resistant >=32 BACTERIAL SUSCEPTIBILITY PANEL MARGARITO Final    aztreonam Sensitive <=1 BACTERIAL SUSCEPTIBILITY PANEL MARGARITO Final    ceFAZolin Resistant >=64 BACTERIAL SUSCEPTIBILITY PANEL MARGARITO Final    cefTRIAXone Sensitive <=1 BACTERIAL SUSCEPTIBILITY PANEL MARGARITO Final    ciprofloxacin Sensitive <=0.25 BACTERIAL SUSCEPTIBILITY PANEL MARGARITO Final    gentamicin Intermediate 8 BACTERIAL SUSCEPTIBILITY PANEL MARGARITO Final    tobramycin Intermediate 8 BACTERIAL SUSCEPTIBILITY PANEL MARGARITO Final    trimethoprim-sulfamethoxazole Sensitive <=20 BACTERIAL SUSCEPTIBILITY PANEL MARGARITO Final    piperacillin-tazobactam Sensitive <=4 BACTERIAL SUSCEPTIBILITY PANEL MARGARITO Final          Specimen Collected: 12/04/22 13:07 EST Last Resulted: 12/06/22 09:03 EST        Culture, Urine [[id number]] (Abnormal)  Collected: 12/04/22 1215   Order Status: Completed Specimen: Urine, indwelling catheter Updated: 12/05/22 2400    Specimen Description . INDWELLING CATH URINE    Culture PROTEUS VULGARIS >[id number] CFU/ML Abnormal      Proteus vulgaris (1)    Antibiotic Interpretation Microscan Method Status    ampicillin Resistant >=32 BACTERIAL SUSCEPTIBILITY PANEL MARGARITO Final    aztreonam Sensitive <=1 BACTERIAL SUSCEPTIBILITY PANEL MARGARITO Final    cefTRIAXone Sensitive <=1 BACTERIAL SUSCEPTIBILITY PANEL MARGARITO Final    ciprofloxacin Sensitive <=0.25 BACTERIAL SUSCEPTIBILITY PANEL MARGARITO Final    gentamicin Intermediate 8 BACTERIAL SUSCEPTIBILITY PANEL MARGARITO Final    nitrofurantoin Resistant 128 BACTERIAL SUSCEPTIBILITY PANEL MARGARITO Final    tobramycin Intermediate 8 BACTERIAL SUSCEPTIBILITY PANEL MARGARITO Final    trimethoprim-sulfamethoxazole Sensitive <=20 BACTERIAL SUSCEPTIBILITY PANEL MARGARITO Final    piperacillin-tazobactam Sensitive <=4 BACTERIAL SUSCEPTIBILITY PANEL MARGARITO Final          Specimen Collected: 12/04/22 12:15 EST Last Resulted: 12/05/22 21:48 EST        Medications:      cefTRIAXone (ROCEPHIN) IV  2,000 mg IntraVENous Q24H    tamsulosin  0.4 mg Oral Daily    sodium chloride flush  5-40 mL IntraVENous 2 times per day           [facility]  Tequila Alonso MD  [employer] messaging  OFFICE: [phone]      Electronically signed by Tequila Alonso MD on 12/6/2022 at 9:42 AM  Thank you for allowing us to participate in the care of this patient. Please call with questions. This note iscreated with the assistance of a speech recognition program.  While intending to generate a document that actually reflects the content of the visit, the document can still have some errors including those of syntax andsound a like substitutions which may escape proof reading. In such instances, actual meaning can be extrapolated by contextual diversion.

## 2022-12-06 NOTE — PLAN OF CARE
Problem: Discharge Planning  Goal: Discharge to home or other facility with appropriate resources  12/6/2022 1723 by Darrell Roberson RN  Outcome: Completed  12/6/2022 1349 by Darrell Roberson RN  Outcome: Progressing     Problem: Pain  Goal: Verbalizes/displays adequate comfort level or baseline comfort level  12/6/2022 1723 by Darrell Roberson RN  Outcome: Completed  12/6/2022 1349 by Darrell Roberson RN  Outcome: Progressing     Problem: Genitourinary - Adult  Goal: Urinary catheter remains patent  Outcome: Completed     Problem: Metabolic/Fluid and Electrolytes - Adult  Goal: Electrolytes maintained within normal limits  12/6/2022 1723 by Darrell Roberson RN  Outcome: Completed  12/6/2022 1349 by Darrell Roberson RN  Outcome: Progressing  Goal: Hemodynamic stability and optimal renal function maintained  12/6/2022 1723 by Darrell Roberson RN  Outcome: Completed  12/6/2022 1349 by Darrell Roberson RN  Outcome: Progressing  Goal: Glucose maintained within prescribed range  12/6/2022 1723 by Darrell Roberson RN  Outcome: Completed  12/6/2022 1349 by Darrell Roberson RN  Outcome: Progressing     Problem: Neurosensory - Adult  Goal: Achieves stable or improved neurological status  Outcome: Completed  Goal: Achieves maximal functionality and self care  Outcome: Completed     Problem: Cardiovascular - Adult  Goal: Absence of cardiac dysrhythmias or at baseline  Outcome: Completed     Problem: Skin/Tissue Integrity - Adult  Goal: Skin integrity remains intact  Outcome: Completed     Problem: Musculoskeletal - Adult  Goal: Return ADL status to a safe level of function  Outcome: Completed     Problem: Behavior  Goal: Pt/Family maintain appropriate behavior and adhere to behavioral management agreement, if implemented  Description: INTERVENTIONS:  1. Assess patient/family's coping skills and  non-compliant behavior (including use of illegal substances)  2.  Notify security of behavior or suspected illegal substances which indicate the need for search of the family and/or belongings  3. Encourage verbalization of thoughts and concerns in a socially appropriate manner  4. Utilize positive, consistent limit setting strategies supporting safety of patient, staff and others  5. Encourage participation in the decision making process about the behavioral management agreement  6. If a visitor's behavior poses a threat to safety call refer to organization policy.   7. Initiate consult with , Psychosocial CNS, Spiritual Care as appropriate  Outcome: Completed     Problem: Safety - Adult  Goal: Free from fall injury  Outcome: Completed

## 2022-12-06 NOTE — PROGRESS NOTES
[facility]   Urology Progress Note            Subjective: Follow-up urinary retention enlarged prostate indwelling Garcia    Patient Vitals for the past 24 hrs:   BP Temp Temp src Pulse Resp SpO2   12/06/22 0650 133/88 98.1 °F (36.7 °C) Oral 57 16 100 %   12/05/22 1946 132/75 98.4 °F (36.9 °C) Oral 71 20 100 %   12/05/22 1510 118/72 98.2 °F (36.8 °C) -- 72 16 97 %   12/05/22 1117 107/67 98.6 °F (37 °C) -- 76 16 97 %   12/05/22 0728 (!) 120/108 98.4 °F (36.9 °C) -- 81 16 97 %       Intake/Output Summary (Last 24 hours) at 12/6/2022 0720  Last data filed at 12/6/2022 5152  Gross per 24 hour   Intake --   Output 4850 ml   Net -4850 ml       Recent Labs     12/04/22  1100 12/05/22  0640   WBC 19.7* 13.6*   HGB 15.3 12.6*   HCT 46.9 39.1*   MCV 89.5 90.7    188     Recent Labs     12/04/22  1100 12/05/22  0640    143   K 4.8 4.1    110*   CO2 24 25   BUN 44* 28*   CREATININE 1.66* 0.94       Recent Labs     12/04/22  1215   COLORU Yellow   PHUR 8.5*   WBCUA TOO NUMEROUS TO COUNT   RBCUA 20 TO 50   SPECGRAV 1.015   LEUKOCYTESUR LARGE*   UROBILINOGEN Normal   BILIRUBINUR NEGATIVE       Additional Lab/culture results:    Physical Exam: Patient had requested cystoscopy yesterday for further evaluation later on he contacted us stating he prefers not to have any urologic procedure during this admission    Interval Imaging Findings:    Impression:    Patient Active Problem List   Diagnosis    Benign prostatic hyperplasia with urinary retention    Nephrolithiasis    Acute kidney injury superimposed on chronic kidney disease (HCC)    Hydronephrosis    Abnormal CT of the abdomen    Bladder outlet obstruction    Acute cystitis with hematuria    Sepsis due to Proteus species ([address]    Gram-negative bacteremia    Retention of urine    Urinary tract infection with hematuria       Plan:  We will defer on his urologic care to his primary urologist we recommend to maintain indwelling Foley Murry Nissen, MD  7:20 AM 12/6/2022

## 2022-12-06 NOTE — PLAN OF CARE
Problem: Discharge Planning  Goal: Discharge to home or other facility with appropriate resources  Outcome: Progressing  Flowsheets (Taken 12/5/2022 1910)  Discharge to home or other facility with appropriate resources:   Identify barriers to discharge with patient and caregiver   Arrange for needed discharge resources and transportation as appropriate   Identify discharge learning needs (meds, wound care, etc)     Problem: Pain  Goal: Verbalizes/displays adequate comfort level or baseline comfort level  12/5/2022 2252 by Osiris Weiss RN  Outcome: Progressing  Flowsheets (Taken 12/5/2022 0008)  Verbalizes/displays adequate comfort level or baseline comfort level:   Encourage patient to monitor pain and request assistance   Assess pain using appropriate pain scale   Administer analgesics based on type and severity of pain and evaluate response   Implement non-pharmacological measures as appropriate and evaluate response     Problem: Genitourinary - Adult  Goal: Urinary catheter remains patent  12/5/2022 2252 by Osiris Weiss RN  Outcome: Progressing  Flowsheets (Taken 12/5/2022 1910)  Urinary catheter remains patent: Assess patency of urinary catheter     Problem: Metabolic/Fluid and Electrolytes - Adult  Goal: Electrolytes maintained within normal limits  Outcome: Progressing  Flowsheets (Taken 12/5/2022 1910)  Electrolytes maintained within normal limits:   Monitor labs and assess patient for signs and symptoms of electrolyte imbalances   Administer electrolyte replacement as ordered   Monitor response to electrolyte replacements, including repeat lab results as appropriate     Problem: Metabolic/Fluid and Electrolytes - Adult  Goal: Hemodynamic stability and optimal renal function maintained  Outcome: Progressing  Flowsheets (Taken 12/5/2022 1910)  Hemodynamic stability and optimal renal function maintained:   Monitor labs and assess for signs and symptoms of volume excess or deficit   Monitor intake, output and patient weight   Monitor urine specific gravity, serum osmolarity and serum sodium as indicated or ordered   Monitor response to interventions for patient's volume status, including labs, urine output, blood pressure (other measures as available)     Problem: Metabolic/Fluid and Electrolytes - Adult  Goal: Glucose maintained within prescribed range  Outcome: Progressing  Flowsheets (Taken 12/5/2022 1910)  Glucose maintained within prescribed range:   Monitor blood glucose as ordered   Assess for signs and symptoms of hyperglycemia and hypoglycemia   Administer ordered medications to maintain glucose within target range     Problem: Neurosensory - Adult  Goal: Achieves stable or improved neurological status  12/5/2022 2252 by Isabell Logan RN  Outcome: Progressing  Flowsheets (Taken 12/5/2022 1910)  Achieves stable or improved neurological status: Assess for and report changes in neurological status     Problem: Neurosensory - Adult  Goal: Achieves maximal functionality and self care  12/5/2022 2252 by Isabell Logan RN  Outcome: Progressing  Flowsheets (Taken 12/5/2022 1910)  Achieves maximal functionality and self care: Encourage and assist patient to increase activity and self care with guidance from physical therapy/occupational therapy     Problem: Cardiovascular - Adult  Goal: Absence of cardiac dysrhythmias or at baseline  Outcome: Progressing  Flowsheets (Taken 12/5/2022 1910)  Absence of cardiac dysrhythmias or at baseline:   Monitor cardiac rate and rhythm   Assess for signs of decreased cardiac output     Problem: Skin/Tissue Integrity - Adult  Goal: Skin integrity remains intact  12/5/2022 2252 by Isabell Logan RN  Outcome: Progressing  Flowsheets  Taken 12/5/2022 2252  Skin Integrity Remains Intact:   Monitor for areas of redness and/or skin breakdown   Assess vascular access sites hourly  Taken 12/5/2022 1910  Skin Integrity Remains Intact:   Monitor for areas of redness and/or skin breakdown   Assess vascular access sites hourly     Problem: Musculoskeletal - Adult  Goal: Return ADL status to a safe level of function  12/5/2022 2252 by Rachele Litten, RN  Outcome: Progressing  Flowsheets (Taken 12/5/2022 1910)  Return ADL Status to a Safe Level of Function:   Administer medication as ordered   Assess activities of daily living deficits and provide assistive devices as needed   Obtain physical therapy/occupational therapy consults as needed   Assist and instruct patient to increase activity and self care as tolerated

## 2022-12-06 NOTE — PROGRESS NOTES
Writer reminded patient that he was not to eat or drink after midnight tonight as he has a cystoscopy planned tomorrow with Dr. Hailey Meléndez. Patient was confused about the procedure and told the writer that he \"does not need an unnecessary procedure\". Writer tried to inform patient on why the procedure is beneficial but patient was persistent on not wanting it. Writer paged Dr. Hailey Meléndez and is waiting for a response at this time.  Thanks

## 2022-12-06 NOTE — PROGRESS NOTES
Dr. Fazal Patton called back to unit says that since patient is refusing he will take him off the surgery list for tomorrow. Patient updated.

## 2022-12-06 NOTE — PROGRESS NOTES
[facility]  Office: [address], DO, Elena Mendosa, DO, Jasmina Williamson, DO, Shirley Rascon, DO, Fredi Bucio MD, Faby Cordero MD, Marlene Berger MD, Maria Fernanda Mancia MD,  Mariza Renee MD, Britni Horner MD, Mariposa Saeed DO, Noe Weston MD,  Jac Peterson MD, Gadiel Rosario MD, Katie Meek, DO, Russ Jefferson MD, Lazaro Sweeney MD, Hayden Boyer, DO, Lisbeth Holcomb MD, Humberto Alcantara MD, Nupur Ramos MD, Zeeshan Long MD, Kendall Hart DO, Berry Guerrero MD, Liz Lopez MD, Josie Andujar, CNP,  Scott Avilez, CNP, Bonita Mckeon, CNP, Whit Pereyra, CNP,  Jacek Melendez, [facility], Sumi Denise, CNP, Hodan Mitchell, CNP, Jose Daniel Ch, CNP, Brianna Whitaker, CNP, Sam Rodriguez, CNP, Johnathan Cardoza, PA-C, Derek Tracy, CNS, Geovani Victoria, [facility], Omar Carl, [facility]    Progress Note    12/6/2022    11:33 AM    Name:   Jermain Harper  MRN:     [id number]     Acct:      [de-identified]   Room:   2021/2021-01   Day:  2  Admit Date:  12/4/2022 10:36 AM    PCP:   Dolores Esteves MD  Code Status:  Full Code    Subjective:     C/C:   Chief Complaint   Patient presents with    Urinary Retention     Pt states he has a catheter in and it came out of position this AM      Interval History Status: improved. Patient is resting company, denies any complaints of chest pain, shortness of breath, nausea vomiting, fever chills or acute complaints. Brief History: This is a 47-year-old male with a history of chronic urinary retention that presents with abdominal pain after displacement of his urinary catheter. Upon evaluation he was found to have bilateral hydronephrosis and evidence of urinary tract infection. He ultimately was also bacteremic with Proteus species.     Review of Systems:     Constitutional:  negative for chills, fevers, sweats  Respiratory:  negative for cough, dyspnea on exertion, shortness of breath, wheezing  Cardiovascular:  negative for chest pain, chest pressure/discomfort, lower extremity edema, palpitations  Gastrointestinal:  negative for abdominal pain, constipation, diarrhea, nausea, vomiting  Neurological:  negative for dizziness, headache    Medications: Allergies: Allergies   Allergen Reactions    Sulfa Antibiotics Swelling     And rash    Amoxicillin Nausea And Vomiting       Current Meds:   Scheduled Meds:    cefTRIAXone (ROCEPHIN) IV  2,000 mg IntraVENous Q24H    tamsulosin  0.4 mg Oral Daily    sodium chloride flush  5-40 mL IntraVENous 2 times per day     Continuous Infusions:    sodium chloride       PRN Meds: sodium chloride flush, sodium chloride, ondansetron **OR** ondansetron, acetaminophen **OR** acetaminophen, polyethylene glycol    Data:     Past Medical History:   has a past medical history of BPH with obstruction/lower urinary tract symptoms, Chronic ear infection, and Renal failure. Social History:   reports that he has been smoking cigarettes. He has a 17.50 pack-year smoking history. He has never used smokeless tobacco. He reports that he does not drink alcohol and does not use drugs. Family History:   Family History   Problem Relation Age of Onset    High Blood Pressure Mother     Lung Cancer Mother     Stomach Cancer Father     Pancreatic Cancer Paternal Grandmother     Pancreatic Cancer Other        Vitals:  /70   Pulse 59   Temp 98.1 °F (36.7 °C) (Oral)   Resp 16   Ht 5' 4\" (1.626 m)   Wt 145 lb 9.6 oz (66 kg)   SpO2 100%   BMI 24.99 kg/m²   Temp (24hrs), Av.2 °F (36.8 °C), Min:98.1 °F (36.7 °C), Max:98.4 °F (36.9 °C)    No results for input(s): POCGLU in the last 72 hours. I/O (24Hr):     Intake/Output Summary (Last 24 hours) at 2022 1133  Last data filed at 2022 0619  Gross per 24 hour   Intake --   Output 4850 ml   Net -4850 ml       Labs:  Hematology:  Recent Labs     22  1100 22  0640 WBC 19.7* 13.6*   RBC 5.24 4.31   HGB 15.3 12.6*   HCT 46.9 39.1*   MCV 89.5 90.7   MCH 29.2 29.2   MCHC 32.6 32.2   RDW 15.3* 16.0*    188   MPV 8.3 8.6   INR  --  1.3     Chemistry:  Recent Labs     12/04/22  1100 12/05/22  0640    143   K 4.8 4.1    110*   CO2 24 25   GLUCOSE 165* 127*   BUN 44* 28*   CREATININE 1.66* 0.94   ANIONGAP 13 8*   LABGLOM 47* >60   CALCIUM 10.3 8.6   No results for input(s): PROT, LABALBU, LABA1C, X4ARZLU, K5RGGQE, FT4, TSH, AST, ALT, LDH, GGT, ALKPHOS, LABGGT, BILITOT, BILIDIR, AMMONIA, AMYLASE, LIPASE, LACTATE, CHOL, HDL, LDLCHOLESTEROL, CHOLHDLRATIO, TRIG, VLDL, CQO24XC, PHENYTOIN, PHENYF, URICACID, POCGLU in the last 72 hours. ABG:No results found for: POCPH, PHART, PH, POCPCO2, QRG1FKA, PCO2, POCPO2, PO2ART, PO2, POCHCO3, ETS9TQS, HCO3, NBEA, PBEA, BEART, BE, THGBART, THB, SYF4QEW, UFKC1NJV, S4CLHTPM, O2SAT, FIO2  Lab Results   Component Value Date/Time    SPECIAL LFA,12ML 12/04/2022 01:07 PM     Lab Results   Component Value Date/Time    CULTURE POSITIVE Blood Culture (A) 12/04/2022 01:07 PM    CULTURE DIRECT GRAM STAIN FROM BOTTLE: GRAM NEGATIVE RODS 12/04/2022 01:07 PM    CULTURE  12/04/2022 01:07 PM     Proteus species Detected: Methodology- Polymerase Chain Reaction (PCR)    CULTURE PROTEUS VULGARIS (A) 12/04/2022 01:07 PM    CULTURE  12/04/2022 01:07 PM     (NOTE) Direct Gram Stain from bottle and Polymerase Chain Reaction (PCR) results called to and read back by: SHIRA BARFIELD AT 0532 ON 12/5/22. Radiology:  CT ABDOMEN PELVIS WO CONTRAST Additional Contrast? None    Addendum Date: 12/4/2022    ADDENDUM: Correction of voice transcription error in the impression. IMPRESSION 4 should read as follows: Urinary bladder catheterized. Gas pockets in the bladder attributed to catheterization. Result Date: 12/4/2022  1. Urinary bladder distension with some wall thickening and scattered wall calcification.   Calcification most pronounced from 6 to 9 o'clock outlet obstruction 12/4/2022 Yes    Urinary tract infection associated with indwelling urethral catheter ([address] 12/6/2022 Yes    Bacteremia due to Proteus species 12/6/2022 Yes    Retention of urine 12/5/2022 Yes    Urinary tract infection with hematuria 12/5/2022 Yes       Plan:        Antibiotics per ID  Urology evaluation, recommend outpatient cystoscopy with his regular urologist  Activity as tolerated  PT and OT as needed  GI DVT prophylaxis  Discharge 95 Kiki Mcguire DO  12/6/2022  11:33 AM

## 2022-12-06 NOTE — PROGRESS NOTES
Physician Progress Note      PATIENT:               Lala King  CSN #:                  [id number]  :                       1965  ADMIT DATE:       2022 10:36 AM  DISCH DATE:  Vearl Barthel  PROVIDER #:        Raza Maza DO          QUERY TEXT:    Pt admitted with UTI. Pt noted to have chronic indwelling urinary catheter. If possible, please document in the progress notes and discharge summary if   you are evaluating and/or treating any of the following: The medical record reflects the following:  Risk Factors: urinary retention , Chronic obstructive uropathy secondary to   BPH with an indwelling Garcia catheter  Clinical Indicators: ED provider;  visualized by the nurse attempting to   insert a second catheter into his urethral; he brought the catheter from home. UA cloudy, large leukocyte, WBC too numerous, RBC 20-50  Treatment: Garcia catheter was removed and a new one inserted by the RN. He had   >1000 mLs of output. 1L IVF bolus, IVF @ 75, Rocephin, Zofran , Flomax  Options provided:  -- UTI due to chronic indwelling urinary catheter  -- UTI not due to indwelling urinary catheter  -- Other - I will add my own diagnosis  -- Disagree - Not applicable / Not valid  -- Disagree - Clinically unable to determine / Unknown  -- Refer to Clinical Documentation Reviewer    PROVIDER RESPONSE TEXT:    UTI is due to the chronic indwelling urinary catheter. Query created by: Edie Giron on 2022 1:38 PM      Electronically signed by:   Raza Maza DO 2022 12:03 PM

## 2022-12-06 NOTE — DISCHARGE INSTRUCTIONS
Learning About Indwelling Urinary Catheter Care to Prevent Infection  Overview     A urinary catheter is a flexible plastic tube that's used to drain urine from your bladder when you can't urinate on your own. The catheter allows urine to drain from the bladder into a bag. Two types of drainage bags may be used with a urinary catheter. A bedside bag is a large bag that you can hang on the side of your bed or on a chair. You can use it overnight or anytime you will be sitting or lying down for a long time. A leg bag is a small bag that you can use during the day. It is usually attached to your thigh or calf and hidden under your clothes. Having a urinary catheter increases your risk of getting a urinary tract infection. Germs may get on the catheter and cause an infection in your bladder or kidneys. The longer you have a catheter, the more likely it is that you will get an infection. You can help prevent this problem with good hygiene and careful handling of your catheter and drainage bags. How can you help prevent infection? Take care to stay clean  Always wash your hands well before and after you handle your catheter. Clean the skin around the catheter daily using soap and water. Dry with a clean towel afterward. You can shower with your catheter and drainage bag in place unless your doctor told you not to. When you clean around the catheter, check the surrounding skin for signs of infection. Look for things like pus and irritated, swollen, red, or tender skin around the catheter. Be careful with your drainage bag  Always keep the drainage bag below the level of your bladder. This will help keep urine from flowing back into your bladder. Check often to see that urine is flowing through the catheter into the drainage bag. Empty the drainage bag when it is half full. This will keep it from overflowing or backing up.   When you empty the drainage bag, do not let the tubing or drain spout touch anything. Keep the cap that comes with the tubing, and cover the tip of the tubing when not in use. Be careful with your catheter  Do not unhook the catheter from the drain tube until you are ready to change the tubing and bag. That could let germs get into the tube. Make sure that the catheter tubing does not get twisted or kinked. Do not tug or pull on the catheter. And make sure that the drainage bag does not drag or pull on the catheter. Do not put powder or lotion on the skin around the catheter. Talk with your doctor about your options for sexual intercourse while wearing a catheter. How do you empty the bag? If your doctor has asked you to keep a record, write down the amount of urine in the bag before you empty it. Wash your hands before and after you touch the bag. Remove the drain spout from its sleeve at the bottom of the drainage bag. Open the valve on the drain spout. Let the urine flow out into the toilet or a container. Be careful not to let the tubing or drain spout touch anything. After you empty the bag, close the valve. Then put the drain spout back into its sleeve at the bottom of the collection bag. How do you switch to a bedside bag for overnight use? Wash your hands before and after you handle the bags. Empty the leg bag that is attached to the tubing and catheter. Put a clean towel under the tubing attached to the leg bag. Use an alcohol wipe to clean the tip of the tubing attached to the bedside bag. To stop the flow of urine, pinch the catheter with your fingers just above the tubing connection. Use a twisting motion to disconnect the leg bag tubing from the catheter. Then securely connect the catheter to the tubing from the bedside bag. How do you clean a bedside bag? Many people clean their bedside bag in the morning if they switch to a leg bag. To clean a bedside drainage bag:  Remove the bedside bag and attach the leg bag.   Fill the bedside bag with 2 parts vinegar and 3 parts water. Let it stand for 20 minutes. Empty the bag, and let it air dry. When should you call for help? Call your doctor now or seek immediate medical care if:    You have symptoms of a urinary infection. These may include:  Pain or burning when you urinate. A frequent need to urinate without being able to pass much urine. Pain in the flank, which is just below the rib cage and above the waist on either side of the back. Blood in your urine. A fever. Your urine smells bad. You see large blood clots in your urine. No urine or very little urine is flowing into the bag for 4 or more hours. Watch closely for changes in your health, and be sure to contact your doctor if:    The area around the catheter becomes irritated, swollen, red, or tender, or there is pus draining from it. Urine is leaking from the place where the catheter enters your body. Follow-up care is a key part of your treatment and safety. Be sure to make and go to all appointments, and call your doctor if you are having problems. It's also a good idea to know your test results and keep a list of the medicines you take. Where can you learn more? Go to https://[employer]. org and sign in to your [employer] account. Enter U010 in the [employer] box to learn more about \"Learning About Indwelling Urinary Catheter Care to Prevent Infection. \"     If you do not have an account, please click on the \"Sign Up Now\" link. Current as of: June 16, 2022               Content Version: 13.4  © 2006-2022 Healthwise, [facility]. Care instructions adapted under license by [facility] ([facility]). If you have questions about a medical condition or this instruction, always ask your healthcare professional. [place] [address] any warranty or liability for your use of this information.

## 2022-12-06 NOTE — PLAN OF CARE
Problem: Discharge Planning  Goal: Discharge to home or other facility with appropriate resources  Outcome: Progressing     Problem: Metabolic/Fluid and Electrolytes - Adult  Goal: Electrolytes maintained within normal limits  Outcome: Progressing  Goal: Hemodynamic stability and optimal renal function maintained  Outcome: Progressing  Goal: Glucose maintained within prescribed range  Outcome: Progressing     Problem: Pain  Goal: Verbalizes/displays adequate comfort level or baseline comfort level  Outcome: Progressing

## 2022-12-09 NOTE — DISCHARGE SUMMARY
[facility]  Office: [address], DO, Radha Clare, DO, Gerald [place], DO, Maribel Payne Blood, DO, Vern Moody MD, Pramod Mariscal MD, Connor Guevara MD, Chrissie Arzate MD,  Willy Handley MD, Peace Logan MD, Davy Lowe DO, Kristine Kiser MD,  Nel Skaggs MD, Estevan Silva MD, Femi Lala DO, Lisa Dyer MD, Hilary Johnson MD, Garry Zamora DO, Yefri Bhatt MD, Federica Danielle MD, Todd Lion MD, Allie Colindres MD, Katie Turner DO, Lilliam Geronimo MD, Kim Spears MD, Salvador Rendon, CNP,  Lucrecia Bender, CNP, Montrell Mathew, CNP, Louis Gee, CNP,  Shiela Ferguson, [facility], Janiya Oreilly, CNP, Kathryn Poole, CNP, Emerson Vazquez, CNP, Mary Alice Naqvi, CNP, Von Wild, CNP, Evgeny Curry PA-C, Clarita Salguero, CNS, Jaguar Monaco, CNP, Cielo Chan, [facility]    Discharge Summary     Patient ID: Ashley Smith  :  1965   MRN: [id number]     ACCOUNT:  [de-identified]   Patient's PCP: Harlan Shelby MD  Admit Date: 2022   Discharge Date: 2022     Length of Stay: 2  Code Status:  Prior  Admitting Physician: Connor Guevara MD  Discharge Physician: Teresa Monroy DO     Active Discharge Diagnoses:     Hospital Problem Lists:  Principal Problem:    Sepsis due to Proteus species [facility])  Active Problems:    Benign prostatic hyperplasia with urinary retention    Acute kidney injury superimposed on chronic kidney disease [facility])    Hydronephrosis    Abnormal CT of the abdomen    Bladder outlet obstruction    Urinary tract infection associated with indwelling urethral catheter ([facility] [address]    Bacteremia due to Proteus species    Retention of urine    Urinary tract infection with hematuria  Resolved Problems:    * No resolved hospital problems. *      Admission Condition:  fair     Discharged Condition: stable    Hospital Stay:     Hospital Course:  Ashley Smith is a 59 y.o. male who was admitted for the management of  Sepsis due to Proteus species [facility]) , presented to ER with Urinary Retention (Pt states he has a catheter in and it came out of position this AM )    Is a 61-year-old male with history of urinary retention that presents with displacement of his urinary catheter. He had onset of abdominal pain and upon evaluation was found to have retention with bilateral hydronephrosis. There is evidence of urinary tract infection as well. Ultimately was treated with antibiotics for Proteus species and cleared by infectious disease and urology for discharge. He is to follow-up with his regular urologist for outpatient cystoscopy. Significant therapeutic interventions: As above    Significant Diagnostic Studies:   Labs / Micro:  CBC:   Lab Results   Component Value Date/Time    WBC 13.6 12/05/2022 06:40 AM    RBC 4.31 12/05/2022 06:40 AM    HGB 12.6 12/05/2022 06:40 AM    HCT 39.1 12/05/2022 06:40 AM    MCV 90.7 12/05/2022 06:40 AM    MCH 29.2 12/05/2022 06:40 AM    MCHC 32.2 12/05/2022 06:40 AM    RDW 16.0 12/05/2022 06:40 AM     12/05/2022 06:40 AM     BMP:    Lab Results   Component Value Date/Time    GLUCOSE 127 12/05/2022 06:40 AM     12/05/2022 06:40 AM    K 4.1 12/05/2022 06:40 AM     12/05/2022 06:40 AM    CO2 25 12/05/2022 06:40 AM    ANIONGAP 8 12/05/2022 06:40 AM    BUN 28 12/05/2022 06:40 AM    CREATININE 0.94 12/05/2022 06:40 AM    BUNCRER 30 12/05/2022 06:40 AM    CALCIUM 8.6 12/05/2022 06:40 AM    LABGLOM >60 12/05/2022 06:40 AM    GFRAA >60 12/16/2019 09:07 AM    GFR      12/16/2019 09:07 AM    GFR NOT REPORTED 12/16/2019 09:07 AM        Radiology:  CT ABDOMEN PELVIS WO CONTRAST Additional Contrast? None    Addendum Date: 12/4/2022    ADDENDUM: Correction of voice transcription error in the impression. IMPRESSION 4 should read as follows: Urinary bladder catheterized. Gas pockets in the bladder attributed to catheterization.      Result Date: 12/4/2022  1. Urinary bladder distension with some wall thickening and scattered wall calcification. Calcification most pronounced from 6 to 9 o'clock position in the axial plane where there is also asymmetric soft tissue wall thickening at around the 9 o'clock position. Bladder malignancy is not excluded. Correlation with cystoscopy advised. 2. There is perivesical fat stranding and free fluid in the pelvis and free fluid elsewhere around the liver. Difficult to determine if the perivesical fat stranding is due to bladder inflammation or due to the free fluid. Nonetheless concern for cystitis. 3. Moderate bilateral hydronephrosis and hydroureter attributed to obstruction at the level of the urinary bladder and potentially bladder outlet obstruction. 4. Urinary bladder catheterized. Gas pockets in the bladder attributed gastritis a shin. 5. Bilateral inguinal hernia. RECOMMENDATIONS: Urology consultation. Consultations:    Consults:     Final Specialist Recommendations/Findings:   IP CONSULT TO INTERNAL MEDICINE  IP CONSULT TO UROLOGY  IP CONSULT TO INFECTIOUS DISEASES      The patient was seen and examined on day of discharge and this discharge summary is in conjunction with any daily progress note from day of discharge.     Discharge plan:     Disposition: Home    Physician Follow Up:   PCP 1 week  Alisa Shultz MD  [address]  [address]  [phone]    Follow up in 1 week(s)      Jeanine Jefferson MD  [address]  [phone]    Follow up in 1 week(s)         Requiring Further Evaluation/Follow Up POST HOSPITALIZATION/Incidental Findings: Outpatient cystoscopy    Diet: cardiac diet    Activity: As tolerated    Instructions to Patient: Take medications as prescribed    Discharge Medications:      Medication List        START taking these medications      cefdinir 300 MG capsule  Commonly known as: OMNICEF  Take 1 capsule by mouth 2 times daily for 10 days CONTINUE taking these medications      aspirin 325 MG tablet     azelastine 0.1 % nasal spray  Commonly known as: ASTELIN     gabapentin 300 MG capsule  Commonly known as: NEURONTIN     tamsulosin 0.4 MG capsule  Commonly known as: FLOMAX            STOP taking these medications      cephALEXin 500 MG capsule  Commonly known as: Dennice Chum               Where to Get Your Medications        These medications were sent to [facility] [address], [address] [place] [address] [phone] -  [phone]  [address] [place] [id number]      Phone: [phone]   cefdinir 300 MG capsule         No discharge procedures on file. Time Spent on discharge is   27 minutes  in patient examination, evaluation, counseling as well as medication reconciliation, prescriptions for required medications, discharge plan and follow up. Electronically signed by   Fozia Crespo DO  12/9/2022  5:07 PM      Thank you Dr. Margarette Spatz, MD for the opportunity to be involved in this patient's care.

## 2022-12-17 ENCOUNTER — HOSPITAL ENCOUNTER (EMERGENCY)
Age: 57
Discharge: HOME | End: 2022-12-17
Attending: EMERGENCY MEDICINE
Payer: COMMERCIAL

## 2022-12-17 VITALS
BODY MASS INDEX: 24.75 KG/M2 | RESPIRATION RATE: 16 BRPM | SYSTOLIC BLOOD PRESSURE: 141 MMHG | DIASTOLIC BLOOD PRESSURE: 103 MMHG | TEMPERATURE: 98.3 F | OXYGEN SATURATION: 100 % | WEIGHT: 145 LBS | HEART RATE: 87 BPM | HEIGHT: 64 IN

## 2022-12-17 DIAGNOSIS — R33.9 URINARY RETENTION: Primary | ICD-10-CM

## 2022-12-17 DIAGNOSIS — N17.9 AKI (ACUTE KIDNEY INJURY) (HCC): ICD-10-CM

## 2022-12-17 LAB
ABSOLUTE EOS #: 0 K/UL (ref 0–0.4)
ABSOLUTE IMMATURE GRANULOCYTE: 0 K/UL (ref 0–0.3)
ABSOLUTE LYMPH #: 0.56 K/UL (ref 1–4.8)
ABSOLUTE MONO #: 1.11 K/UL (ref 0.2–0.8)
ANION GAP SERPL CALCULATED.3IONS-SCNC: 15 MMOL/L (ref 9–17)
BASOPHILS # BLD: 0 %
BASOPHILS ABSOLUTE: 0 K/UL (ref 0–0.2)
BILIRUBIN URINE: NEGATIVE
BUN BLDV-MCNC: 42 MG/DL (ref 6–20)
BUN/CREAT BLD: 11 (ref 9–20)
CALCIUM SERPL-MCNC: 9.4 MG/DL (ref 8.6–10.4)
CHLORIDE BLD-SCNC: 96 MMOL/L (ref 98–107)
CO2: 21 MMOL/L (ref 20–31)
COLOR: YELLOW
CREAT SERPL-MCNC: 3.88 MG/DL (ref 0.7–1.2)
EOSINOPHILS RELATIVE PERCENT: 0 % (ref 1–4)
EPITHELIAL CELLS UA: ABNORMAL /HPF (ref 0–5)
GFR SERPL CREATININE-BSD FRML MDRD: 17 ML/MIN/1.73M2
GLUCOSE BLD-MCNC: 110 MG/DL (ref 70–99)
GLUCOSE URINE: NEGATIVE
HCT VFR BLD CALC: 44.4 % (ref 40.7–50.3)
HEMOGLOBIN: 14.6 G/DL (ref 13–17)
IMMATURE GRANULOCYTES: 0 %
KETONES, URINE: NEGATIVE
LEUKOCYTE ESTERASE, URINE: ABNORMAL
LYMPHOCYTES # BLD: 3 % (ref 24–44)
MCH RBC QN AUTO: 29.4 PG (ref 25.2–33.5)
MCHC RBC AUTO-ENTMCNC: 32.9 G/DL (ref 28.4–34.8)
MCV RBC AUTO: 89.5 FL (ref 82.6–102.9)
MONOCYTES # BLD: 6 % (ref 1–7)
NITRITE, URINE: NEGATIVE
NRBC AUTOMATED: 0 PER 100 WBC
PDW BLD-RTO: 15.3 % (ref 11.8–14.4)
PH UA: 5.5 (ref 5–8)
PLATELET # BLD: 287 K/UL (ref 138–453)
PMV BLD AUTO: 8.3 FL (ref 8.1–13.5)
POTASSIUM SERPL-SCNC: 4 MMOL/L (ref 3.7–5.3)
PROTEIN UA: ABNORMAL
RBC # BLD: 4.96 M/UL (ref 4.21–5.77)
RBC UA: ABNORMAL /HPF (ref 0–2)
SEG NEUTROPHILS: 91 % (ref 36–66)
SEGMENTED NEUTROPHILS ABSOLUTE COUNT: 16.83 K/UL (ref 1.8–7.7)
SODIUM BLD-SCNC: 132 MMOL/L (ref 135–144)
SPECIFIC GRAVITY UA: 1.02 (ref 1–1.03)
TURBIDITY: ABNORMAL
URINE HGB: ABNORMAL
UROBILINOGEN, URINE: NORMAL
WBC # BLD: 18.5 K/UL (ref 3.5–11.3)
WBC UA: ABNORMAL /HPF (ref 0–5)

## 2022-12-17 PROCEDURE — 99284 EMERGENCY DEPT VISIT MOD MDM: CPT

## 2022-12-17 PROCEDURE — 87077 CULTURE AEROBIC IDENTIFY: CPT

## 2022-12-17 PROCEDURE — 81001 URINALYSIS AUTO W/SCOPE: CPT

## 2022-12-17 PROCEDURE — 2580000003 HC RX 258: Performed by: EMERGENCY MEDICINE

## 2022-12-17 PROCEDURE — 80048 BASIC METABOLIC PNL TOTAL CA: CPT

## 2022-12-17 PROCEDURE — 85025 COMPLETE CBC W/AUTO DIFF WBC: CPT

## 2022-12-17 PROCEDURE — 51702 INSERT TEMP BLADDER CATH: CPT

## 2022-12-17 PROCEDURE — 87186 SC STD MICRODIL/AGAR DIL: CPT

## 2022-12-17 PROCEDURE — 87086 URINE CULTURE/COLONY COUNT: CPT

## 2022-12-17 PROCEDURE — 6370000000 HC RX 637 (ALT 250 FOR IP): Performed by: EMERGENCY MEDICINE

## 2022-12-17 RX ORDER — CEPHALEXIN 500 MG/1
500 CAPSULE ORAL ONCE
Status: COMPLETED | OUTPATIENT
Start: 2022-12-17 | End: 2022-12-17

## 2022-12-17 RX ORDER — CEPHALEXIN 500 MG/1
500 CAPSULE ORAL 2 TIMES DAILY
Qty: 14 CAPSULE | Refills: 0 | Status: SHIPPED | OUTPATIENT
Start: 2022-12-17 | End: 2022-12-24

## 2022-12-17 RX ORDER — 0.9 % SODIUM CHLORIDE 0.9 %
1000 INTRAVENOUS SOLUTION INTRAVENOUS ONCE
Status: COMPLETED | OUTPATIENT
Start: 2022-12-17 | End: 2022-12-17

## 2022-12-17 RX ORDER — LIDOCAINE HYDROCHLORIDE 20 MG/ML
20 JELLY TOPICAL ONCE
Status: COMPLETED | OUTPATIENT
Start: 2022-12-17 | End: 2022-12-17

## 2022-12-17 RX ADMIN — LIDOCAINE HYDROCHLORIDE 20 ML: 20 JELLY TOPICAL at 10:18

## 2022-12-17 RX ADMIN — CEPHALEXIN 500 MG: 500 CAPSULE ORAL at 14:08

## 2022-12-17 RX ADMIN — SODIUM CHLORIDE 1000 ML: 9 INJECTION, SOLUTION INTRAVENOUS at 11:10

## 2022-12-17 RX ADMIN — SODIUM CHLORIDE 1000 ML: 9 INJECTION, SOLUTION INTRAVENOUS at 12:50

## 2022-12-17 ASSESSMENT — PAIN SCALES - GENERAL: PAINLEVEL_OUTOF10: 10

## 2022-12-17 ASSESSMENT — ENCOUNTER SYMPTOMS
BACK PAIN: 0
EYE PAIN: 0
EYE DISCHARGE: 0
SHORTNESS OF BREATH: 0
FACIAL SWELLING: 0
ABDOMINAL PAIN: 0
CHEST TIGHTNESS: 0
ABDOMINAL DISTENTION: 0

## 2022-12-17 ASSESSMENT — PAIN - FUNCTIONAL ASSESSMENT: PAIN_FUNCTIONAL_ASSESSMENT: 0-10

## 2022-12-17 NOTE — ED NOTES
Pt accompanied by significant other to ER. Pt reports henderson catheter came out while he was having a bowel movement a few days ago. Pt's significant other reports the catheter tubing got caught on something and accidentally came out. Pt reports he last urinated on his own Thursday afternoon. Pt states he has not been drinking much - pt dehydrated according to significant other. Pt states he wants a urologist to place his henderson catheter because when a nurse does it, he pees blood for a week. Pt's significant other states she called prior to coming here and was told there is a urologist on call.       Mara Santiago, MLAIK  12/17/22 [id number]

## 2022-12-17 NOTE — ED PROVIDER NOTES
EMERGENCY DEPARTMENT ENCOUNTER    Pt Name: Toya Damon  MRN: [id number]  [place] 1965  Date of evaluation: 12/17/22  CHIEF COMPLAINT       Chief Complaint   Patient presents with    Other     Garcia fell out    Dysuria     HISTORY OF PRESENT ILLNESS   HPI  The patient is a 68-year-old with a history of BPH who presented to the emergency department secondary to urinary retention. Patient complains of a 4-day history of urinary retention. He previously self cath but has not been able to self cath. Patient also had a Garcia catheter placed with a leg bag however it was removed. Patient initially did not come to the emergency department because he thought it will resolve on its own. He did attempt to call his urologist but was not able to get an appointment. Patient is complaining of pain localized to his urethra 10 out of 10 on the pain scale. Patient denied chest pain, shortness of breath, nausea, vomiting, fevers or chills    REVIEW OF SYSTEMS     Review of Systems   Constitutional:  Negative for chills, diaphoresis and fever. HENT:  Negative for congestion, ear pain and facial swelling. Eyes:  Negative for pain, discharge and visual disturbance. Respiratory:  Negative for chest tightness and shortness of breath. Cardiovascular:  Negative for chest pain and palpitations. Gastrointestinal:  Negative for abdominal distention and abdominal pain. Genitourinary:  Positive for difficulty urinating and dysuria. Negative for flank pain. Musculoskeletal:  Negative for back pain. Skin:  Negative for wound. Neurological:  Negative for dizziness, light-headedness and headaches.    PASTMEDICAL HISTORY     Past Medical History:   Diagnosis Date    BPH with obstruction/lower urinary tract symptoms     Chronic ear infection     Renal failure      SURGICAL HISTORY       Past Surgical History:   Procedure Laterality Date    APPENDECTOMY      KNEE SURGERY      left     CURRENT MEDICATIONS       Previous Medications    ASPIRIN 325 MG TABLET    Take 325 mg by mouth 3 times daily as needed. AZELASTINE (ASTELIN) 0.1 % NASAL SPRAY    2 sprays by Nasal route 2 times daily    GABAPENTIN (NEURONTIN) 300 MG CAPSULE    Take 300 mg by mouth 3 times daily. TAMSULOSIN (FLOMAX) 0.4 MG CAPSULE    Take 0.4 mg by mouth daily     ALLERGIES     is allergic to sulfa antibiotics and amoxicillin. FAMILY HISTORY     He indicated that his mother is . He indicated that his father is . He indicated that his paternal grandmother is . He indicated that his other is . SOCIAL HISTORY       Social History     Tobacco Use    Smoking status: Some Days     Packs/day: 0.50     Years: 35.00     Pack years: 17.50     Types: Cigarettes    Smokeless tobacco: Never   Substance Use Topics    Alcohol use: No    Drug use: No     PHYSICAL EXAM     INITIAL VITALS: BP (!) 141/103   Pulse 87   Temp 98.3 °F (36.8 °C) (Oral)   Resp 16   Ht 5' 4\" (1.626 m)   Wt 145 lb (65.8 kg)   SpO2 100%   BMI 24.89 kg/m²    Physical Exam  Vitals and nursing note reviewed. Constitutional:       General: He is not in acute distress. Appearance: He is well-developed. He is not diaphoretic. HENT:      Head: Normocephalic and atraumatic. Eyes:      Pupils: Pupils are equal, round, and reactive to light. Cardiovascular:      Rate and Rhythm: Normal rate and regular rhythm. Pulmonary:      Effort: Pulmonary effort is normal.      Breath sounds: Normal breath sounds. Abdominal:      General: Bowel sounds are normal.      Palpations: Abdomen is soft. Musculoskeletal:         General: Normal range of motion. Cervical back: Normal range of motion and neck supple. Skin:     General: Skin is warm. Capillary Refill: Capillary refill takes less than 2 seconds. Neurological:      Mental Status: He is alert and oriented to person, place, and time.        MEDICAL DECISION MAKING:   The patient is a 61-year-old male who presented to the emergency department secondary to urinary retention. Orders for bladder scan, CBC BMP, UA and urine culture. Orders also for Garcia to be placed. Patient will be reevaluated. Garcia placed by nursing staff. UA and urine culture obtained. BUN and creatinine elevated with evidence of YOAN. Urinary tract infection initiated on antibiotics. Orders for IV fluids. Discussed with patient admission however he declined he agreed to receive IV hydration in the emergency department (2 liters). At home with a prescription for Keflex outpatient follow-up and parameters to return to the emergency department        HEART SCORE:                                                                                                                                                                                                                                                                                                                                                                                                                                                  All patient's question's and concerns were answered prior to disposition and patient and/or family expressed understanding and agreement of treatment plan. CRITICAL CARE:              NIH STROKE SCALE:            PROCEDURES:    Procedures    DIAGNOSTIC RESULTS   EKG:All EKG's are interpreted by the Emergency Department Physician who either signs or Co-signs this chart in the absence of a cardiologist.        RADIOLOGY:All plain film, CT, MRI, and formal ultrasound images (except ED bedside ultrasound) are read by the radiologist, see reports below, unless otherwisenoted in MDM or here. No orders to display     LABS: All lab results were reviewed by myself, and all abnormals are listed below.   Labs Reviewed   BASIC METABOLIC PANEL - Abnormal; Notable for the following components:       Result Value    Glucose 110 (*)     BUN 42 (*) Creatinine 3.88 (*)     Est, Glom Filt Rate 17 (*)     Sodium 132 (*)     Chloride 96 (*)     All other components within normal limits   CBC WITH AUTO DIFFERENTIAL - Abnormal; Notable for the following components:    WBC 18.5 (*)     RDW 15.3 (*)     Seg Neutrophils 91 (*)     Lymphocytes 3 (*)     Eosinophils % 0 (*)     Segs Absolute 16.83 (*)     Absolute Lymph # 0.56 (*)     Absolute Mono # 1.11 (*)     All other components within normal limits   URINALYSIS WITH MICROSCOPIC - Abnormal; Notable for the following components:    Turbidity UA Cloudy (*)     Urine Hgb 2+ (*)     Protein, UA 1+ (*)     Leukocyte Esterase, Urine LARGE (*)     All other components within normal limits   CULTURE, URINE       EMERGENCY DEPARTMENTCOURSE:         Vitals:    Vitals:    12/17/22 0926   BP: (!) 141/103   Pulse: 87   Resp: 16   Temp: 98.3 °F (36.8 °C)   TempSrc: Oral   SpO2: 100%   Weight: 145 lb (65.8 kg)   Height: 5' 4\" (1.626 m)       The patient was given the following medications while in the emergency department:  Orders Placed This Encounter   Medications    lidocaine (XYLOCAINE) 2 % uro-jet 20 mL    0.9 % sodium chloride bolus    0.9 % sodium chloride bolus    cephALEXin (KEFLEX) capsule 500 mg     Order Specific Question:   Antimicrobial Indications     Answer:   Urinary Tract Infection    cephALEXin (KEFLEX) 500 MG capsule     Sig: Take 1 capsule by mouth 2 times daily for 7 days     Dispense:  14 capsule     Refill:  0     CONSULTS:  None    FINAL IMPRESSION      1. Urinary retention    2.  YOAN (acute kidney injury) [facility])          DISPOSITION/PLAN   DISPOSITION Decision To Discharge 12/17/2022 01:28:41 PM      PATIENT REFERRED TO:  Harlan Shelby MD  [address]  [phone]        DISCHARGE MEDICATIONS:  New Prescriptions    CEPHALEXIN (KEFLEX) 500 MG CAPSULE    Take 1 capsule by mouth 2 times daily for 7 days     Dennise Nicholas MD  Attending Emergency Physician      The care is provided during an unprecedented national emergency due to the novel coronavirus, COVID 19. This note was created with the assistance of a speech-recognition program. While intending to generate a document that actually reflects the content of the visit, no guarantees can be provided that every mistake has been identified and corrected by editing.     Heike Magallon MD  03/91/53 7004

## 2022-12-17 NOTE — ED NOTES
Pt's significant other at bedside - tells pt when he is ready to go home, she will come pick him up. Pt denies c/o pain at this time and states he is feeling better.      Franck Covington RN  12/17/22 6264

## 2022-12-17 NOTE — ED NOTES
Writer found cellphone in room. Called emergency contact, April, to notify. Agreeable to come .      Mary Salas  12/17/22 9665

## 2022-12-18 LAB
CULTURE: ABNORMAL
SPECIMEN DESCRIPTION: ABNORMAL

## 2023-01-01 ENCOUNTER — APPOINTMENT (OUTPATIENT)
Dept: CT IMAGING | Age: 58
End: 2023-01-01
Payer: COMMERCIAL

## 2023-01-01 ENCOUNTER — ANESTHESIA EVENT (OUTPATIENT)
Dept: OPERATING ROOM | Age: 58
End: 2023-01-01
Payer: COMMERCIAL

## 2023-01-01 ENCOUNTER — ANESTHESIA (OUTPATIENT)
Dept: OPERATING ROOM | Age: 58
End: 2023-01-01
Payer: COMMERCIAL

## 2023-01-01 ENCOUNTER — HOSPITAL ENCOUNTER (OUTPATIENT)
Age: 58
Setting detail: OBSERVATION
Discharge: HOME | End: 2023-01-03
Attending: STUDENT IN AN ORGANIZED HEALTH CARE EDUCATION/TRAINING PROGRAM
Payer: COMMERCIAL

## 2023-01-01 DIAGNOSIS — R31.0 GROSS HEMATURIA: ICD-10-CM

## 2023-01-01 DIAGNOSIS — R33.9 URINARY RETENTION: Primary | ICD-10-CM

## 2023-01-01 PROBLEM — Z72.0 TOBACCO ABUSE: Status: ACTIVE | Noted: 2023-01-01

## 2023-01-01 PROBLEM — T83.021A: Status: ACTIVE | Noted: 2023-01-01

## 2023-01-01 PROBLEM — N17.9 AKI (ACUTE KIDNEY INJURY) (HCC): Status: ACTIVE | Noted: 2023-01-01

## 2023-01-01 LAB
ABSOLUTE EOS #: 0 K/UL (ref 0–0.4)
ABSOLUTE IMMATURE GRANULOCYTE: 0 K/UL (ref 0–0.3)
ABSOLUTE LYMPH #: 0.48 K/UL (ref 1–4.8)
ABSOLUTE MONO #: 0.48 K/UL (ref 0.2–0.8)
ANION GAP SERPL CALCULATED.3IONS-SCNC: 13 MMOL/L (ref 9–17)
BASOPHILS # BLD: 0 %
BASOPHILS ABSOLUTE: 0 K/UL (ref 0–0.2)
BILIRUBIN URINE: NEGATIVE
BUN BLDV-MCNC: 37 MG/DL (ref 6–20)
BUN/CREAT BLD: 24 (ref 9–20)
CALCIUM SERPL-MCNC: 9.4 MG/DL (ref 8.6–10.4)
CHLORIDE BLD-SCNC: 98 MMOL/L (ref 98–107)
CO2: 22 MMOL/L (ref 20–31)
COLOR: YELLOW
CREAT SERPL-MCNC: 1.55 MG/DL (ref 0.7–1.2)
EOSINOPHILS RELATIVE PERCENT: 0 % (ref 1–4)
EPITHELIAL CELLS UA: ABNORMAL /HPF (ref 0–5)
GFR SERPL CREATININE-BSD FRML MDRD: 51 ML/MIN/1.73M2
GLUCOSE BLD-MCNC: 226 MG/DL (ref 70–99)
GLUCOSE URINE: NEGATIVE
HCT VFR BLD CALC: 47 % (ref 40.7–50.3)
HEMOGLOBIN: 15.2 G/DL (ref 13–17)
IMMATURE GRANULOCYTES: 0 %
KETONES, URINE: NEGATIVE
LEUKOCYTE ESTERASE, URINE: ABNORMAL
LYMPHOCYTES # BLD: 4 % (ref 24–44)
MCH RBC QN AUTO: 29 PG (ref 25.2–33.5)
MCHC RBC AUTO-ENTMCNC: 32.3 G/DL (ref 28.4–34.8)
MCV RBC AUTO: 89.7 FL (ref 82.6–102.9)
MONOCYTES # BLD: 4 % (ref 1–7)
NITRITE, URINE: POSITIVE
NRBC AUTOMATED: 0 PER 100 WBC
PDW BLD-RTO: 14.6 % (ref 11.8–14.4)
PH UA: 7 (ref 5–8)
PLATELET # BLD: 204 K/UL (ref 138–453)
PMV BLD AUTO: 8.6 FL (ref 8.1–13.5)
POTASSIUM SERPL-SCNC: 4.1 MMOL/L (ref 3.7–5.3)
PROTEIN UA: ABNORMAL
RBC # BLD: 5.24 M/UL (ref 4.21–5.77)
RBC UA: ABNORMAL /HPF (ref 0–2)
SEG NEUTROPHILS: 92 % (ref 36–66)
SEGMENTED NEUTROPHILS ABSOLUTE COUNT: 10.94 K/UL (ref 1.8–7.7)
SODIUM BLD-SCNC: 133 MMOL/L (ref 135–144)
SPECIFIC GRAVITY UA: 1.01 (ref 1–1.03)
TURBIDITY: ABNORMAL
URINE HGB: ABNORMAL
UROBILINOGEN, URINE: NORMAL
WBC # BLD: 11.9 K/UL (ref 3.5–11.3)
WBC UA: ABNORMAL /HPF (ref 0–5)

## 2023-01-01 PROCEDURE — 6360000002 HC RX W HCPCS: Performed by: INTERNAL MEDICINE

## 2023-01-01 PROCEDURE — G0378 HOSPITAL OBSERVATION PER HR: HCPCS

## 2023-01-01 PROCEDURE — 7100000000 HC PACU RECOVERY - FIRST 15 MIN: Performed by: UROLOGY

## 2023-01-01 PROCEDURE — 6370000000 HC RX 637 (ALT 250 FOR IP): Performed by: STUDENT IN AN ORGANIZED HEALTH CARE EDUCATION/TRAINING PROGRAM

## 2023-01-01 PROCEDURE — 3600000002 HC SURGERY LEVEL 2 BASE: Performed by: UROLOGY

## 2023-01-01 PROCEDURE — 87086 URINE CULTURE/COLONY COUNT: CPT

## 2023-01-01 PROCEDURE — 6370000000 HC RX 637 (ALT 250 FOR IP): Performed by: UROLOGY

## 2023-01-01 PROCEDURE — 2580000003 HC RX 258: Performed by: UROLOGY

## 2023-01-01 PROCEDURE — 80048 BASIC METABOLIC PNL TOTAL CA: CPT

## 2023-01-01 PROCEDURE — 99285 EMERGENCY DEPT VISIT HI MDM: CPT

## 2023-01-01 PROCEDURE — 2500000003 HC RX 250 WO HCPCS: Performed by: ANESTHESIOLOGY

## 2023-01-01 PROCEDURE — 3600000012 HC SURGERY LEVEL 2 ADDTL 15MIN: Performed by: UROLOGY

## 2023-01-01 PROCEDURE — 96375 TX/PRO/DX INJ NEW DRUG ADDON: CPT

## 2023-01-01 PROCEDURE — C1769 GUIDE WIRE: HCPCS | Performed by: UROLOGY

## 2023-01-01 PROCEDURE — 3700000000 HC ANESTHESIA ATTENDED CARE: Performed by: UROLOGY

## 2023-01-01 PROCEDURE — 3700000001 HC ADD 15 MINUTES (ANESTHESIA): Performed by: UROLOGY

## 2023-01-01 PROCEDURE — 81001 URINALYSIS AUTO W/SCOPE: CPT

## 2023-01-01 PROCEDURE — 96374 THER/PROPH/DIAG INJ IV PUSH: CPT

## 2023-01-01 PROCEDURE — 6360000002 HC RX W HCPCS: Performed by: ANESTHESIOLOGY

## 2023-01-01 PROCEDURE — 2580000003 HC RX 258: Performed by: STUDENT IN AN ORGANIZED HEALTH CARE EDUCATION/TRAINING PROGRAM

## 2023-01-01 PROCEDURE — 74176 CT ABD & PELVIS W/O CONTRAST: CPT

## 2023-01-01 PROCEDURE — 7100000001 HC PACU RECOVERY - ADDTL 15 MIN: Performed by: UROLOGY

## 2023-01-01 PROCEDURE — 6360000002 HC RX W HCPCS: Performed by: STUDENT IN AN ORGANIZED HEALTH CARE EDUCATION/TRAINING PROGRAM

## 2023-01-01 PROCEDURE — 96361 HYDRATE IV INFUSION ADD-ON: CPT

## 2023-01-01 PROCEDURE — 2580000003 HC RX 258: Performed by: INTERNAL MEDICINE

## 2023-01-01 PROCEDURE — 2709999900 HC NON-CHARGEABLE SUPPLY: Performed by: UROLOGY

## 2023-01-01 PROCEDURE — 85025 COMPLETE CBC W/AUTO DIFF WBC: CPT

## 2023-01-01 RX ORDER — ONDANSETRON 2 MG/ML
4 INJECTION INTRAMUSCULAR; INTRAVENOUS ONCE
Status: COMPLETED | OUTPATIENT
Start: 2023-01-01 | End: 2023-01-01

## 2023-01-01 RX ORDER — 0.9 % SODIUM CHLORIDE 0.9 %
1000 INTRAVENOUS SOLUTION INTRAVENOUS ONCE
Status: COMPLETED | OUTPATIENT
Start: 2023-01-01 | End: 2023-01-01

## 2023-01-01 RX ORDER — SODIUM CHLORIDE 0.9 % (FLUSH) 0.9 %
5-40 SYRINGE (ML) INJECTION EVERY 12 HOURS SCHEDULED
Status: DISCONTINUED | OUTPATIENT
Start: 2023-01-01 | End: 2023-01-03 | Stop reason: HOSPADM

## 2023-01-01 RX ORDER — ONDANSETRON 2 MG/ML
4 INJECTION INTRAMUSCULAR; INTRAVENOUS EVERY 6 HOURS PRN
Status: DISCONTINUED | OUTPATIENT
Start: 2023-01-01 | End: 2023-01-03 | Stop reason: HOSPADM

## 2023-01-01 RX ORDER — SODIUM CHLORIDE 9 MG/ML
INJECTION, SOLUTION INTRAVENOUS PRN
Status: DISCONTINUED | OUTPATIENT
Start: 2023-01-01 | End: 2023-01-01 | Stop reason: HOSPADM

## 2023-01-01 RX ORDER — LIDOCAINE HYDROCHLORIDE 20 MG/ML
5 JELLY TOPICAL ONCE
Status: COMPLETED | OUTPATIENT
Start: 2023-01-01 | End: 2023-01-01

## 2023-01-01 RX ORDER — SODIUM CHLORIDE 0.9 % (FLUSH) 0.9 %
5-40 SYRINGE (ML) INJECTION EVERY 12 HOURS SCHEDULED
Status: DISCONTINUED | OUTPATIENT
Start: 2023-01-01 | End: 2023-01-01 | Stop reason: HOSPADM

## 2023-01-01 RX ORDER — CIPROFLOXACIN 2 MG/ML
400 INJECTION, SOLUTION INTRAVENOUS EVERY 12 HOURS
Status: DISCONTINUED | OUTPATIENT
Start: 2023-01-01 | End: 2023-01-02

## 2023-01-01 RX ORDER — ONDANSETRON 2 MG/ML
4 INJECTION INTRAMUSCULAR; INTRAVENOUS
Status: DISCONTINUED | OUTPATIENT
Start: 2023-01-01 | End: 2023-01-01 | Stop reason: HOSPADM

## 2023-01-01 RX ORDER — TAMSULOSIN HYDROCHLORIDE 0.4 MG/1
0.4 CAPSULE ORAL DAILY
Status: DISCONTINUED | OUTPATIENT
Start: 2023-01-01 | End: 2023-01-03 | Stop reason: HOSPADM

## 2023-01-01 RX ORDER — AZELASTINE 1 MG/ML
2 SPRAY, METERED NASAL 2 TIMES DAILY
Status: DISCONTINUED | OUTPATIENT
Start: 2023-01-01 | End: 2023-01-03 | Stop reason: HOSPADM

## 2023-01-01 RX ORDER — MAGNESIUM HYDROXIDE 1200 MG/15ML
LIQUID ORAL CONTINUOUS PRN
Status: COMPLETED | OUTPATIENT
Start: 2023-01-01 | End: 2023-01-01

## 2023-01-01 RX ORDER — SODIUM CHLORIDE 0.9 % (FLUSH) 0.9 %
5-40 SYRINGE (ML) INJECTION PRN
Status: DISCONTINUED | OUTPATIENT
Start: 2023-01-01 | End: 2023-01-03 | Stop reason: HOSPADM

## 2023-01-01 RX ORDER — LIDOCAINE HYDROCHLORIDE 20 MG/ML
5 JELLY TOPICAL PRN
Status: DISCONTINUED | OUTPATIENT
Start: 2023-01-01 | End: 2023-01-01

## 2023-01-01 RX ORDER — SODIUM CHLORIDE 9 MG/ML
INJECTION, SOLUTION INTRAVENOUS CONTINUOUS
Status: DISCONTINUED | OUTPATIENT
Start: 2023-01-01 | End: 2023-01-03

## 2023-01-01 RX ORDER — FENTANYL CITRATE 50 UG/ML
25 INJECTION, SOLUTION INTRAMUSCULAR; INTRAVENOUS EVERY 5 MIN PRN
Status: DISCONTINUED | OUTPATIENT
Start: 2023-01-01 | End: 2023-01-01 | Stop reason: HOSPADM

## 2023-01-01 RX ORDER — ONDANSETRON 4 MG/1
4 TABLET, ORALLY DISINTEGRATING ORAL EVERY 8 HOURS PRN
Status: DISCONTINUED | OUTPATIENT
Start: 2023-01-01 | End: 2023-01-03 | Stop reason: HOSPADM

## 2023-01-01 RX ORDER — PROPOFOL 10 MG/ML
INJECTION, EMULSION INTRAVENOUS PRN
Status: DISCONTINUED | OUTPATIENT
Start: 2023-01-01 | End: 2023-01-01 | Stop reason: SDUPTHER

## 2023-01-01 RX ORDER — SODIUM CHLORIDE 0.9 % (FLUSH) 0.9 %
5-40 SYRINGE (ML) INJECTION PRN
Status: DISCONTINUED | OUTPATIENT
Start: 2023-01-01 | End: 2023-01-01 | Stop reason: HOSPADM

## 2023-01-01 RX ORDER — ACETAMINOPHEN 325 MG/1
650 TABLET ORAL EVERY 6 HOURS PRN
Status: DISCONTINUED | OUTPATIENT
Start: 2023-01-01 | End: 2023-01-03 | Stop reason: HOSPADM

## 2023-01-01 RX ORDER — MORPHINE SULFATE 4 MG/ML
4 INJECTION, SOLUTION INTRAMUSCULAR; INTRAVENOUS ONCE
Status: COMPLETED | OUTPATIENT
Start: 2023-01-01 | End: 2023-01-01

## 2023-01-01 RX ORDER — ACETAMINOPHEN 650 MG/1
650 SUPPOSITORY RECTAL EVERY 6 HOURS PRN
Status: DISCONTINUED | OUTPATIENT
Start: 2023-01-01 | End: 2023-01-03 | Stop reason: HOSPADM

## 2023-01-01 RX ORDER — HYDROMORPHONE HYDROCHLORIDE 1 MG/ML
0.5 INJECTION, SOLUTION INTRAMUSCULAR; INTRAVENOUS; SUBCUTANEOUS EVERY 5 MIN PRN
Status: DISCONTINUED | OUTPATIENT
Start: 2023-01-01 | End: 2023-01-01 | Stop reason: HOSPADM

## 2023-01-01 RX ORDER — SODIUM CHLORIDE 9 MG/ML
INJECTION, SOLUTION INTRAVENOUS PRN
Status: DISCONTINUED | OUTPATIENT
Start: 2023-01-01 | End: 2023-01-03 | Stop reason: HOSPADM

## 2023-01-01 RX ORDER — LIDOCAINE HYDROCHLORIDE 20 MG/ML
INJECTION, SOLUTION EPIDURAL; INFILTRATION; INTRACAUDAL; PERINEURAL PRN
Status: DISCONTINUED | OUTPATIENT
Start: 2023-01-01 | End: 2023-01-01 | Stop reason: SDUPTHER

## 2023-01-01 RX ORDER — GABAPENTIN 300 MG/1
300 CAPSULE ORAL 3 TIMES DAILY
Status: DISCONTINUED | OUTPATIENT
Start: 2023-01-01 | End: 2023-01-03 | Stop reason: HOSPADM

## 2023-01-01 RX ADMIN — PROPOFOL 50 MG: 10 INJECTION, EMULSION INTRAVENOUS at 10:39

## 2023-01-01 RX ADMIN — LIDOCAINE HYDROCHLORIDE 5 ML: 20 JELLY TOPICAL at 04:20

## 2023-01-01 RX ADMIN — MORPHINE SULFATE 4 MG: 4 INJECTION, SOLUTION INTRAMUSCULAR; INTRAVENOUS at 02:20

## 2023-01-01 RX ADMIN — CEFTRIAXONE 2000 MG: 2 INJECTION, POWDER, FOR SOLUTION INTRAMUSCULAR; INTRAVENOUS at 07:21

## 2023-01-01 RX ADMIN — SODIUM CHLORIDE 1000 ML: 9 INJECTION, SOLUTION INTRAVENOUS at 03:09

## 2023-01-01 RX ADMIN — SODIUM CHLORIDE, PRESERVATIVE FREE 10 ML: 5 INJECTION INTRAVENOUS at 09:30

## 2023-01-01 RX ADMIN — AZELASTINE HYDROCHLORIDE 2 SPRAY: 137 SPRAY, METERED NASAL at 22:22

## 2023-01-01 RX ADMIN — PROPOFOL 50 MG: 10 INJECTION, EMULSION INTRAVENOUS at 10:49

## 2023-01-01 RX ADMIN — LIDOCAINE HYDROCHLORIDE 60 MG: 20 INJECTION, SOLUTION EPIDURAL; INFILTRATION; INTRACAUDAL; PERINEURAL at 10:31

## 2023-01-01 RX ADMIN — ONDANSETRON 4 MG: 2 INJECTION INTRAMUSCULAR; INTRAVENOUS at 02:20

## 2023-01-01 RX ADMIN — PROPOFOL 50 MG: 10 INJECTION, EMULSION INTRAVENOUS at 10:31

## 2023-01-01 RX ADMIN — SODIUM CHLORIDE: 9 INJECTION, SOLUTION INTRAVENOUS at 14:21

## 2023-01-01 RX ADMIN — GABAPENTIN 300 MG: 300 CAPSULE ORAL at 22:21

## 2023-01-01 RX ADMIN — LIDOCAINE HYDROCHLORIDE 5 ML: 20 JELLY TOPICAL at 06:40

## 2023-01-01 RX ADMIN — PROPOFOL 50 MG: 10 INJECTION, EMULSION INTRAVENOUS at 10:42

## 2023-01-01 RX ADMIN — HYDROMORPHONE HYDROCHLORIDE 0.5 MG: 1 INJECTION, SOLUTION INTRAMUSCULAR; INTRAVENOUS; SUBCUTANEOUS at 04:20

## 2023-01-01 RX ADMIN — PROPOFOL 50 MG: 10 INJECTION, EMULSION INTRAVENOUS at 10:35

## 2023-01-01 RX ADMIN — CIPROFLOXACIN 400 MG: 2 INJECTION, SOLUTION INTRAVENOUS at 14:25

## 2023-01-01 ASSESSMENT — ENCOUNTER SYMPTOMS
SHORTNESS OF BREATH: 0
ABDOMINAL PAIN: 1
EYE REDNESS: 0
COLOR CHANGE: 0
ABDOMINAL DISTENTION: 1
EYE DISCHARGE: 0

## 2023-01-01 ASSESSMENT — PAIN SCALES - GENERAL
PAINLEVEL_OUTOF10: 0
PAINLEVEL_OUTOF10: 10

## 2023-01-01 NOTE — ANESTHESIA PRE PROCEDURE
Department of Anesthesiology  Preprocedure Note       Name:  Satya Sparks   Age:  59 y.o.  :  1965                                          MRN:  [id number]         Date:  2023      Surgeon: Elly [place]):  Franco Thakkar MD    Procedure: Procedure(s):  CYSTOSCOPY EVACUATION OF CLOTS, REGALADO CATHETER INSERTION. Medications prior to admission:   Prior to Admission medications    Medication Sig Start Date End Date Taking? Authorizing Provider   gabapentin (NEURONTIN) 300 MG capsule Take 300 mg by mouth 3 times daily. Historical Provider, MD   azelastine (ASTELIN) 0.1 % nasal spray 2 sprays by Nasal route 2 times daily 10/3/22   Historical Provider, MD   tamsulosin (FLOMAX) 0.4 MG capsule Take 0.4 mg by mouth daily 10/3/22   Historical Provider, MD   aspirin 325 MG tablet Take 325 mg by mouth 3 times daily as needed.       Historical Provider, MD       Current medications:    Current Facility-Administered Medications   Medication Dose Route Frequency Provider Last Rate Last Admin    [facility] Hold] gabapentin (NEURONTIN) capsule 300 mg  300 mg Oral TID Harshad P Blood, DO        [MAR Hold] tamsulosin (FLOMAX) capsule 0.4 mg  0.4 mg Oral Daily Harshad P Blood, DO        [MAR Hold] azelastine (ASTELIN) 0.1 % nasal spray 2 spray  2 spray Each Nostril BID Harshad P Blood, DO        [MAR Hold] sodium chloride flush 0.9 % injection 5-40 mL  5-40 mL IntraVENous 2 times per day Harshad P Blood, DO   10 mL at 23 0930    [MAR Hold] sodium chloride flush 0.9 % injection 5-40 mL  5-40 mL IntraVENous PRN Harshad P Blood, DO        [MAR Hold] 0.9 % sodium chloride infusion   IntraVENous PRN Harshad P Blood, DO   NoRateChange at 23 1103    [MAR Hold] ondansetron (ZOFRAN-ODT) disintegrating tablet 4 mg  4 mg Oral Q8H PRN Harshad P Blood, DO        Or    [MAR Hold] ondansetron (ZOFRAN) injection 4 mg  4 mg IntraVENous Q6H PRN Harshad P Blood, DO        [MAR Hold] acetaminophen (TYLENOL) tablet 650 mg  650 mg Oral Q6H PRN Harshad P Blood, DO        Or    [MAR Hold] acetaminophen (TYLENOL) suppository 650 mg  650 mg Rectal Q6H PRN Harshad P Blood, DO        [MAR Hold] 0.9 % sodium chloride infusion   IntraVENous Continuous Harshad P Blood, DO           Allergies: Allergies   Allergen Reactions    Sulfa Antibiotics Swelling     And rash    Amoxicillin Nausea And Vomiting       Problem List:    Patient Active Problem List   Diagnosis Code    Benign prostatic hyperplasia with urinary retention N40.1, R33.8    Nephrolithiasis N20.0    Acute kidney injury superimposed on chronic kidney disease ([facility] [address] N17.9, N18.9    Hydronephrosis N13.30    Abnormal CT of the abdomen R93.5    Bladder outlet obstruction N32.0    Urinary tract infection associated with indwelling urethral catheter (HCC) T83.511A, N39.0    Seps[facility] Proteus species [facility] A41.59    Bacteremia due to Proteus species R78.81, B96.4    Retention of urine R33.9    Urinary tract infection with hematuria N39.0, R31.9    Displacement of Garcia catheter, initial encounter ([facility] N29.582P       Past Medical History:        Diagnosis Date    BPH with obstruction/lower urinary tract symptoms     Chronic ear infection     Renal failure        Past Surgical History:        Procedure Laterality Date    APPENDECTOMY      KNEE SURGERY      left       Social History:    Social History     Tobacco Use    Smoking status: Some Days     Packs/day: 0.50     Years: 35.00     Pack years: 17.50     Types: Cigarettes    Smokeless tobacco: Never   Substance Use Topics    Alcohol use:  No                                Ready to quit: Not Answered  Counseling given: Not Answered      Vital Signs (Current):   Vitals:    01/01/23 0144 01/01/23 0945 01/01/23 1101   BP: (!) 196/108 (!) 185/116 (!) 141/86   Pulse: 91 (!) 108 (!) 104   Resp: 16 18 25   Temp: 98.2 °F (36.8 °C) 98.4 °F (36.9 °C) 99.1 °F (37.3 °C)   TempSrc:  Oral Temporal   SpO2: 97% 95% 99%   Weight: 145 lb (65.8 kg)     Height: 5' 4\" (1.626 m)                                                BP Readings from Last 3 Encounters:   01/01/23 (!) 141/86   12/17/22 (!) 141/103   12/06/22 131/69       NPO Status:                                                                                 BMI:   Wt Readings from Last 3 Encounters:   01/01/23 145 lb (65.8 kg)   12/17/22 145 lb (65.8 kg)   12/05/22 145 lb 9.6 oz (66 kg)     Body mass index is 24.89 kg/m². CBC:   Lab Results   Component Value Date/Time    WBC 11.9 01/01/2023 02:11 AM    RBC 5.24 01/01/2023 02:11 AM    HGB 15.2 01/01/2023 02:11 AM    HCT 47.0 01/01/2023 02:11 AM    MCV 89.7 01/01/2023 02:11 AM    RDW 14.6 01/01/2023 02:11 AM     01/01/2023 02:11 AM       CMP:   Lab Results   Component Value Date/Time     01/01/2023 02:11 AM    K 4.1 01/01/2023 02:11 AM    CL 98 01/01/2023 02:11 AM    CO2 22 01/01/2023 02:11 AM    BUN 37 01/01/2023 02:11 AM    CREATININE 1.55 01/01/2023 02:11 AM    GFRAA >60 12/16/2019 09:07 AM    LABGLOM 51 01/01/2023 02:11 AM    GLUCOSE 226 01/01/2023 02:11 AM    PROT 7.1 12/16/2019 09:07 AM    CALCIUM 9.4 01/01/2023 02:11 AM    BILITOT 0.42 12/16/2019 09:07 AM    ALKPHOS 84 12/16/2019 09:07 AM    AST 21 12/16/2019 09:07 AM    ALT 19 12/16/2019 09:07 AM       POC Tests: No results for input(s): POCGLU, POCNA, POCK, POCCL, POCBUN, POCHEMO, POCHCT in the last 72 hours.     Coags:   Lab Results   Component Value Date/Time    PROTIME 15.9 12/05/2022 06:40 AM    INR 1.3 12/05/2022 06:40 AM       HCG (If Applicable): No results found for: PREGTESTUR, PREGSERUM, HCG, HCGQUANT     ABGs: No results found for: PHART, PO2ART, DKA6YSU, HFU2DSL, BEART, B3XMUHRP     Type & Screen (If Applicable):  No results found for: LABABO, LABRH    Drug/Infectious Status (If Applicable):  No results found for: HIV, HEPCAB    COVID-19 Screening (If Applicable): No results found for: COVID19        Anesthesia Evaluation  Patient summary reviewed and Nursing notes reviewed no history of anesthetic complications:   Airway: Mallampati: II  TM distance: >3 FB   Neck ROM: full  Mouth opening: > = 3 FB   Dental: normal exam         Pulmonary:normal exam        (-) COPD                           Cardiovascular:  Exercise tolerance: no interval change,       (-) past MI, CAD and CABG/stent        Rate: abnormal                    Neuro/Psych:               GI/Hepatic/Renal:   (+) renal disease:,           Endo/Other:        (-) diabetes mellitus               Abdominal:             Vascular: Other Findings:           Anesthesia Plan      MAC     ASA 3 - emergent       Induction: intravenous. MIPS: prophylactic pharmacologic antiemetic agents not administered perioperatively for documented reasons. Anesthetic plan and risks discussed with patient.         Attending anesthesiologist reviewed and agrees with Preprocedure content        poor historian, lethargic, in some acute distress, bladder is distended and hard painful to palpation        Angely Ibarra DO   1/1/2023

## 2023-01-01 NOTE — ED NOTES
ED to inpatient nurses report     Chief Complaint   Patient presents with    Urinary Catheter      Present to ED from home  LOC: alert and orientated to name, place, date  Vital signs   Vitals:    01/01/23 0144   BP: (!) 196/108   Pulse: 91   Resp: 16   Temp: 98.2 °F (36.8 °C)   SpO2: 97%   Weight: 145 lb (65.8 kg)   Height: 5' 4\" (1.626 m)      Oxygen Baseline room air    Current needs required room air   SEPSIS:   [] Lactate X 2 ordered (Yes or No)  [] Antibiotics given (Yes or No)  [] IV Fluids ordered (Yes or No)             [] IV completed (Yes or No)  [] Hourly Vital Signs (Validated)  [] Outstanding Orders:     LDAs:   Peripheral IV 01/01/23 Right Wrist (Active)   Alcohol Cap Used Yes 01/01/23 0220   Dressing Status New dressing applied;Clean, dry & intact 01/01/23 0220   Dressing Type Transparent 01/01/23 0220       Peripheral IV 01/01/23 Left; Inferior Forearm (Active)   Alcohol Cap Used Yes 01/01/23 0444   Dressing Status New dressing applied;Clean, dry & intact 01/01/23 0444   Dressing Type Transparent 01/01/23 0444     Mobility: Independent  Fall Risk:    Pending ED orders: urine  Present condition: stable  Code Status: full code  Consults: None  []  Hospitalist  Completed  [] yes [] no Who:   []  Medicine  Completed  [] yes [] No Who:   []  Cardiology  Completed  [] yes [] No Who:   []  GI   Completed  [] yes [] No Who:   []  Neurology  Completed  [] yes [] No Who:   []  Nephrology Completed  [] yes [] No Who:    []  Vascular  Completed  [] yes [] No Who:   []  Ortho  Completed  [] yes [] No Who:     []  Surgery  Completed  [] yes [] No Who:    []  Urology  Completed  [] yes [] No Who:    []  CT Surgery Completed  [] yes [] No Who:   []  Podiatry  Completed  [] yes [] No Who:    []  Other    Completed  [] yes [] No Who:  Interventions: IV, labs, antibiotics, urinary cath, CT  Important Events: no urine output        Electronically signed by Neha Brice RN on 1/1/2023 at 7:30 AM       [facility] Charlette Keen, [address] [facility]  01/01/23 3699

## 2023-01-01 NOTE — ED NOTES
Called to give report- RN unavailable, will return my call when able.      Kennedy Lyle RN  01/01/23 1579

## 2023-01-01 NOTE — PLAN OF CARE
Problem: Risk for Elopement  Goal: Patient will not exit the unit/facility without proper excort  Outcome: Progressing  Flowsheets (Taken 1/1/2023 0900)  Nursing Interventions for Elopement Risk:   Assist with personal care needs such as toileting, eating, dressing, as needed to reduce the risk of wandering   Make sure patient has all necessary personal care items   Place patient in room far away from exits and stairways   Shoes and clothing collected and placed in gown attire     Problem: ABCDS Injury Assessment  Goal: Absence of physical injury  Outcome: Progressing     Problem: Pain  Goal: Verbalizes/displays adequate comfort level or baseline comfort level  Outcome: Progressing

## 2023-01-01 NOTE — OP NOTE
FACILITY:  [place], [place]   Patricia Alcantar  1965  [id number]    DATE: 01/01/23  SURGEON:  Dr. Uri Tyson MD , MD  ASSISTANT: Dr. Uri Tyson MD MD  PREOPERATIVE DIAGNOSIS:  Gross hematuria, urinary retention  POSTOPERATIVE DIAGNOSIS:  Gross hematuria, urinary retention  PROCEDURES PERFORMED:  1. Cystourethroscopy. 2. Clot evacuation  3. Difficult catheter placement  ANESTHESIA:  Gen ET  COMPLICATIONS:  None. DRAINS:  18 fr [place] tip catheter  SPECIMEN:  none  ESTIMATED BLOOD LOSS:  Less than 5 mL. INDICATIONS FOR THE PROCEDURE:  Patricia Alcantar is a 59 y.o. male presents with a history of gross hematuria and urinary retention. The patient had a catheter malpositioned and there is gross blood per urethra. The risks and benefits of the procedure, as well as possible alternatives and complications were discussed and he consented. DETAILS OF THE PROCEDURE:  The patient was correctly identified in the preoperative holding area. He was brought back to the operating room and placed in the dorsal lithotomy position. Anesthesia was administered; antibiotics administered by Anesthesia. EPC cuffs  were on and functional. Patient was then prepped and draped in the usual sterile fashion. The patient had gross blood per urethra from henderson trauma. Once an appropriate time out had been performed, with all parties consenting, a 25 Vietnamese cystoscope with a 30-degree lens was placed through the urethra into the bladder. There was obvious urethral trauma and a false passage which we navigated around and found the true lumen to the urethra. We then drained the bladder which had cloudy infected looking urine. The catheter was placed over a wire due to the urethral trauma. Hemostasis was achieved. The bladder was drained and the cystoscope was removed. DISPOSITION:  The patient will be admitted to medicine.     Follow up urine culture  He will need to be discharged home with catheter, and follow up with his personal urologist: Dr. Brielle Mitchell

## 2023-01-01 NOTE — ED NOTES
Pt presents to the er c/o his urinary catheter being dislodged pt states that he has had no urine output in over six hours     [facility]  01/01/23 [id number]

## 2023-01-01 NOTE — CONSULTS
Jeremiah Salas MD   Urology Consult Note     Patient:  Vidhya Locke  MRN: [id number]  YOB: 1965    ATTENDING: Jeremiah Salas MD     CHIEF COMPLAINT:  BPH with retention, henderson trauma and hematuria    HISTORY OF PRESENT ILLNESS:   The patient is a 59 y.o. male who presents with BPH with retention, henderson trauma and hematuria. He follows with Dr. Veena Holder at [facility]. Presented to ER after indwelling henderson catheter was accidentally removed with balloon inflated. He developed gross hematuria, and ER had difficulty reinserting the catehter, and the patient developed gross blood per urethra. He had a CT scan which demonstrated remarkably distended bladder with BL hydronephrosis. Patient's old records, notes and chart reviewed and summarized above. Past Medical History:    Past Medical History:   Diagnosis Date    BPH with obstruction/lower urinary tract symptoms     Chronic ear infection     Renal failure        Past Surgical History:    Past Surgical History:   Procedure Laterality Date    APPENDECTOMY      KNEE SURGERY      left         Medications:    Scheduled Meds:   [MAR Hold] gabapentin  300 mg Oral TID    [MAR Hold] tamsulosin  0.4 mg Oral Daily    [MAR Hold] azelastine  2 spray Each Nostril BID    [MAR Hold] sodium chloride flush  5-40 mL IntraVENous 2 times per day     Continuous Infusions:   [MAR Hold] sodium chloride      [MAR Hold] sodium chloride       PRN Meds:. [MAR Hold] sodium chloride flush, [MAR Hold] sodium chloride, [MAR Hold] ondansetron **OR** [MAR Hold] ondansetron, [MAR Hold] acetaminophen **OR** [MAR Hold] acetaminophen    Allergies:  Sulfa antibiotics and Amoxicillin    Social History:    Social History     Socioeconomic History    Marital status:      Spouse name: Not on file    Number of children: Not on file    Years of education: Not on file    Highest education level: Not on file   Occupational History    Not on file   Tobacco Use    Smoking status: Some Days     Packs/day: 0.50     Years: 35.00     Pack years: 17.50     Types: Cigarettes    Smokeless tobacco: Never   Vaping Use    Vaping Use: Never used   Substance and Sexual Activity    Alcohol use: No    Drug use: No    Sexual activity: Not on file   Other Topics Concern    Not on file   Social History Narrative    Not on file     Social Determinants of Health     Financial Resource Strain: Not on file   Food Insecurity: Not on file   Transportation Needs: Not on file   Physical Activity: Not on file   Stress: Not on file   Social Connections: Not on file   Intimate Partner Violence: Not on file   Housing Stability: Not on file       Family History:    Family History   Problem Relation Age of Onset    High Blood Pressure Mother     Lung Cancer Mother     Stomach Cancer Father     Pancreatic Cancer Paternal Grandmother     Pancreatic Cancer Other      Previous Urologic Family history: none    REVIEW OF SYSTEMS:  Constitutional: negative  Eyes: negative  Respiratory: negative  Cardiovascular: negative  Gastrointestinal: negative  Genitourinary: see HPI  Musculoskeletal: negative  Skin: negative   Neurological: negative  Hematological/Lymphatic: negative  Psychological: negative    Physical Exam:    This a 59 y.o. male   Patient Vitals for the past 24 hrs:   BP Temp Temp src Pulse Resp SpO2 Height Weight   01/01/23 0945 (!) 185/116 98.4 °F (36.9 °C) Oral (!) 108 18 95 % -- --   01/01/23 0144 (!) 196/108 98.2 °F (36.8 °C) -- 91 16 97 % 5' 4\" (1.626 m) 145 lb (65.8 kg)     Constitutional: Patient in no acute distress; Neuro: alert and oriented to person place and time. Psych: Mood and affect normal.  Skin: Normal  Lungs: Respiratory effort normal  Cardiovascular:  Normal peripheral pulses  Abdomen: Soft, non-tender, non-distended with no CVA, flank pain, hepatosplenomegaly or hernia. Kidneys normal.  Bladder non-tender and not distended.   Lymphatics: no palpable lymphadenopathy  Penis normal and circumcised  Urethral meatus normal  Scrotal exam normal  Testicles normal bilaterally  Epididymis normal bilaterally  No evidence of inguinal hernia  Anus and perineum normal  Normal rectal tone with no masses  Prostate soft, non-tender to palpation. No palpable nodules. Estimated 40 grams. LABS:  Recent Labs     01/01/23 0211   WBC 11.9*   HGB 15.2   HCT 47.0   MCV 89.7        Recent Labs     01/01/23 0211   *   K 4.1   CL 98   CO2 22   BUN 37*   CREATININE 1.55*     Lab Results   Component Value Date    PSA 3.99 12/16/2019       Additional Lab/culture results:    Urinalysis: No results for input(s): COLORU, PHUR, LABCAST, WBCUA, RBCUA, MUCUS, TRICHOMONAS, YEAST, BACTERIA, CLARITYU, SPECGRAV, LEUKOCYTESUR, UROBILINOGEN, [place] Viktor in the last 72 hours. Invalid input(s): NITRATE, GLUCOSEUKETONESUAMORPHOUS     -----------------------------------------------------------------  Imaging Results:    Assessment and Plan   Impression:    Patient Active Problem List   Diagnosis    Benign prostatic hyperplasia with urinary retention    Nephrolithiasis    Acute kidney injury superimposed on chronic kidney disease (HCC)    Hydronephrosis    Abnormal CT of the abdomen    Bladder outlet obstruction    Urinary tract infection associated with indwelling urethral catheter (HCC)    Sepsis due to Proteus species ([facility]    Bacteremia due to Proteus species    Retention of urine    Urinary tract infection with hematuria    Displacement of Garcia catheter, initial encounter ([facility]       Plan:   Cysto clot evac with catheter placement, possible SPT  The patient will need to follow up with his personal Urologist Dr. Vianney Knowles, and consider bladder outlet surgery vs SPT placement at this is a recurrent issue for the patient. Risks benefits and alternative procedures are explained, informed consent is obtained, and the patient elects to proceed.      Heike Greenwood MD  10:33 AM 1/1/2023

## 2023-01-01 NOTE — PROGRESS NOTES
[facility]  PROGRESS NOTE    Room # 2012/2012-02   Name: Daryl Vale              Reason for visit: Routine    I visited the patient. Admit Date & Time: 1/1/2023  1:43 AM    Assessment:  Daryl Vale is a 59 y.o. male. Upon entering the room patient was sleeping. Intervention:   provided a ministry presence and brief prayer. Outcome:  Patient did not respond. Plan:  Chaplains will remain available to offer spiritual and emotional support as needed. Electronically signed by Aidan Cordero.  Chaplain Real, on 1/1/2023 at 2:46 PM.  Margret      01/01/23 1445   Encounter Summary   Service Provided For: Patient   Referral/Consult From: [facility]   Support System Unknown   Last Encounter  01/01/23   Complexity of Encounter Low   Begin Time 0230   End Time  0232   Total Time Calculated 2 min   Encounter    Type Initial Screen/Assessment   Assessment/Intervention/Outcome   Assessment Unable to assess   Intervention Prayer (assurance of)/[place];Sustaining Presence/Ministry of presence

## 2023-01-01 NOTE — H&P
[facility]  Office: [address], DO, Franko Dugan, DO, Edda Gaxiola, DO, Angel Jacqueline Blood, DO, Reta Castle MD, Krystina Pan MD, David Caro MD, Treasure Ramos MD,  Zuleima Chavez MD, Angelique Paez MD, Angela Oneil, DO, aMrie Guzman MD,  Rosendo Sanchez MD, Alexsandra Osman MD, Ruth Craven, DO, Ly Belle MD, Della Ybarra MD, [place] July, DO, Gregg Hollins MD, Vangie Donovan MD, Gita Hurtado MD, Marilyn Handley MD, Marshall Reid, DO, Lorena Hidalgo MD, Avery Dupree MD, Birtha Members, CNP,  Ryan Rolon, CNP, Casey Recinos, CNP, Dave Wilhelm, CNP,  Miguel Angel Lamb, [facility], Trang Benedict, CNP, Ilsa Farrell, CNP, Sharona Toure, CNP, Megan Doe, CNP, Ian Varner, CNP, Enrrique Penaloza PA-C, Joyce Pompa, CNS, Alverto Bustillos, CNP, Kareen Cano, [facility]    HISTORY AND PHYSICAL EXAMINATION            Date:   1/1/2023  Patient name:  Clay Chavez  Date of admission:  1/1/2023  1:43 AM  MRN:   [id number]  Account:  [de-identified]  YOB: 1965  PCP:    Nalini Bejarano MD  Room:   2012/2012-02  Code Status:    Full Code    Chief Complaint:     Chief Complaint   Patient presents with    Urinary Catheter       History Obtained From:     electronic medical record, reason patient could not give history:  sedated postop    History of Present Illness:     Clay Chavez is a 59 y.o. Non- / non  male who presents with Urinary Catheter   and is admitted to the hospital for the management of Displacement of Henderson catheter, initial encounter [facility]). This 59 yom presented with displacement of his chronic indwelling henderson. He was unable to urinate after it came out. Attempts made to replace it in ER but unsuccessful so taken to OR and had cysto-had gross hematuria upon placement of henderson. He has had recurrent urosepsis with different bacteria.   He is too sleepy postop to get any additional history    Past Medical History:     Past Medical History:   Diagnosis Date    BPH with obstruction/lower urinary tract symptoms     Chronic ear infection     Renal failure         Past Surgical History:     Past Surgical History:   Procedure Laterality Date    APPENDECTOMY      KNEE SURGERY      left        Medications Prior to Admission:     Prior to Admission medications    Medication Sig Start Date End Date Taking? Authorizing Provider   gabapentin (NEURONTIN) 300 MG capsule Take 300 mg by mouth 3 times daily. Historical Provider, MD   azelastine (ASTELIN) 0.1 % nasal spray 2 sprays by Nasal route 2 times daily 10/3/22   Historical Provider, MD   tamsulosin (FLOMAX) 0.4 MG capsule Take 0.4 mg by mouth daily 10/3/22   Historical Provider, MD   aspirin 325 MG tablet Take 325 mg by mouth 3 times daily as needed. Historical Provider, MD        Allergies:     Sulfa antibiotics and Amoxicillin    Social History:     Tobacco:    reports that he has been smoking cigarettes. He has a 17.50 pack-year smoking history. He has never used smokeless tobacco.  Alcohol:      reports no history of alcohol use. Drug Use:  reports no history of drug use. Family History:     Family History   Problem Relation Age of Onset    High Blood Pressure Mother     Lung Cancer Mother     Stomach Cancer Father     Pancreatic Cancer Paternal Grandmother     Pancreatic Cancer Other        Review of Systems:     unobtainable      Physical Exam:   BP (!) 141/88   Pulse (!) 105   Temp 98.1 °F (36.7 °C) (Oral)   Resp 18   Ht 5' 4\" (1.626 m)   Wt 145 lb (65.8 kg)   SpO2 94%   BMI 24.89 kg/m²   Temp (24hrs), Av.6 °F (37 °C), Min:98.1 °F (36.7 °C), Max:99.3 °F (37.4 °C)    No results for input(s): POCGLU in the last 72 hours.     Intake/Output Summary (Last 24 hours) at 2023 1350  Last data filed at 2023 1200  Gross per 24 hour   Intake 650 ml   Output 130 ml   Net 520 ml       General Appearance:  awakens, well appearing, and in no acute distress  Mental status: sleeping  Head:  normocephalic, atraumatic  Eye: no icterus, redness, pupils equal and reactive, extraocular eye movements intact, conjunctiva clear  Ear: normal external ear, no discharge, hearing intact  Nose:  no drainage noted  Mouth: mucous membranes moist  Neck: supple, no carotid bruits, thyroid not palpable  Lungs: Bilateral equal air entry, clear to auscultation, no wheezing, rales or rhonchi, normal effort  Cardiovascular: normal rate, regular rhythm, no murmur, gallop, rub.   Abdomen: Soft, nontender, nondistended, normal bowel sounds, no hepatomegaly or splenomegaly  Neurologic: Cannot assess    Skin: No gross lesions, rashes, bruising or bleeding on exposed skin area  Extremities:  peripheral pulses palpable, no pedal edema or calf pain with palpation  Psych: Cannot assess      Investigations:      Laboratory Testing:  Recent Results (from the past 24 hour(s))   Basic Metabolic Panel    Collection Time: 01/01/23  2:11 AM   Result Value Ref Range    Glucose 226 (H) 70 - 99 mg/dL    BUN 37 (H) 6 - 20 mg/dL    Creatinine 1.55 (H) 0.70 - 1.20 mg/dL    Est, Glom Filt Rate 51 (L) >60 mL/min/1.73m2    Bun/Cre Ratio 24 (H) 9 - 20    Calcium 9.4 8.6 - 10.4 mg/dL    Sodium 133 (L) 135 - 144 mmol/L    Potassium 4.1 3.7 - 5.3 mmol/L    Chloride 98 98 - 107 mmol/L    CO2 22 20 - 31 mmol/L    Anion Gap 13 9 - 17 mmol/L   CBC with Auto Differential    Collection Time: 01/01/23  2:11 AM   Result Value Ref Range    WBC 11.9 (H) 3.5 - 11.3 k/uL    RBC 5.24 4.21 - 5.77 m/uL    Hemoglobin 15.2 13.0 - 17.0 g/dL    Hematocrit 47.0 40.7 - 50.3 %    MCV 89.7 82.6 - 102.9 fL    MCH 29.0 25.2 - 33.5 pg    MCHC 32.3 28.4 - 34.8 g/dL    RDW 14.6 (H) 11.8 - 14.4 %    Platelets 202 444 - 002 k/uL    MPV 8.6 8.1 - 13.5 fL    NRBC Automated 0.0 0.0 per 100 WBC    Seg Neutrophils 92 (H) 36 - 66 %    Lymphocytes 4 (L) 24 - 44 %    Monocytes 4 1 - 7 % Eosinophils % 0 (L) 1 - 4 %    Basophils 0 %    Immature Granulocytes 0 0 %    Segs Absolute 10.94 (H) 1.8 - 7.7 k/uL    Absolute Lymph # 0.48 (L) 1.0 - 4.8 k/uL    Absolute Mono # 0.48 0.2 - 0.8 k/uL    Absolute Eos # 0.00 0.0 - 0.4 k/uL    Basophils Absolute 0.00 0.0 - 0.2 k/uL    Absolute Immature Granulocyte 0.00 0.00 - 0.30 k/uL       Imaging/Diagnostics:  CT ABDOMEN PELVIS WO CONTRAST Additional Contrast? None    Result Date: 1/1/2023  Bilateral moderate hydronephrosis redemonstrated, similar to previous CT scan finding on 12/04/2022. Bladder is markedly distended. Hypodensity around the bladder, indicating edema and inflammatory changes. In the lower posterior bladder wall there are some calcifications noted. Previous noted calcific densities in the right posterolateral and right lateral bladder wall are mostly resolved with minimal calcific foci at this time. No diagnostic finding in liver, gallbladder, pancreas, spleen, adrenal glands. Appendix not identified. Evidence of nonspecific small amount of gas scattered in proximal small bowel with fluid levels. Moderate to large amount of stool and gas present in proximal colon. No pneumoperitoneum.        Assessment :      Hospital Problems             Last Modified POA    * (Principal) Displacement of Garcia catheter, initial encounter ([facility] 1/1/2023 Yes    Urinary tract infection associated with indwelling urethral catheter ([address] 1/1/2023 Yes    Retention of urine 1/1/2023 Yes    Gross hematuria 1/1/2023 Yes    YOAN (acute kidney injury) ([address] 1/1/2023 Yes    Tobacco abuse 1/1/2023 Yes       Plan:     Patient status inpatient in the Med/Surge    Urology consult-taken to OR for cysto  Garcia to remain in place  Hold all anticoagulation and anti-platelets with hematuria  Send urine testing but suspect recurrence of uti given retention  Start cipro as last 2 utis were sensitive to this but 1 of those 2 was resistant to rocephin  Gentle iv fluids  Monitor renal function  Smoking cessation    Consultations:   IP CONSULT TO UROLOGY     Patient is admitted as inpatient status because of co-morbidities listed above, severity of signs and symptoms as outlined, requirement for current medical therapies and most importantly because of direct risk to patient if care not provided in a hospital setting. Expected length of stay > 48 hours.     Harshad Rascon DO  1/1/2023  1:50 PM    Copy sent to Dr. Shen Martinez MD

## 2023-01-01 NOTE — ED PROVIDER NOTES
[facility]  Emergency Department Encounter     Pt Name: Rhona Snellen  MRN: [id number]  [place] 1965  Date of evaluation: 1/1/23  PCP:  Rey Ga MD    CHIEF COMPLAINT       Chief Complaint   Patient presents with    Urinary Catheter       HISTORY OFPRESENT ILLNESS  (Location/Symptom, Timing/Onset, Context/Setting, Quality, Duration, Modifying Letta Little.)      Rhona Snellen is a 59 y.o. male who presents with urinary catheter dysfunction. States he has long history of BPH with obstruction and has had urosepsis previously as well. States he follows with a urologist at [address] [place] [address]. Has had intermittent Garcia catheter since June. States he caught his Garcia catheter on a object and it was pulled. Has not been able to urinate since. Complaining of severe suprapubic abdominal pain. Worsening. Worse with palpation. Worse with movement. PAST MEDICAL / SURGICAL / SOCIAL / FAMILY HISTORY      has a past medical history of BPH with obstruction/lower urinary tract symptoms, Chronic ear infection, and Renal failure.     has a past surgical history that includes knee surgery and Appendectomy.     Social History     Socioeconomic History    Marital status:      Spouse name: Not on file    Number of children: Not on file    Years of education: Not on file    Highest education level: Not on file   Occupational History    Not on file   Tobacco Use    Smoking status: Some Days     Packs/day: 0.50     Years: 35.00     Pack years: 17.50     Types: Cigarettes    Smokeless tobacco: Never   Vaping Use    Vaping Use: Never used   Substance and Sexual Activity    Alcohol use: No    Drug use: No    Sexual activity: Not on file   Other Topics Concern    Not on file   Social History Narrative    Not on file     Social Determinants of Health     Financial Resource Strain: Not on file   Food Insecurity: Not on file   Transportation Needs: Not on file   Physical Activity: Not on file   Stress: Not on file   Social Connections: Not on file   Intimate Partner Violence: Not on file   Housing Stability: Not on file       Family History   Problem Relation Age of Onset    High Blood Pressure Mother     Lung Cancer Mother     Stomach Cancer Father     Pancreatic Cancer Paternal Grandmother     Pancreatic Cancer Other        Allergies:  Sulfa antibiotics and Amoxicillin    Home Medications:  Prior to Admission medications    Medication Sig Start Date End Date Taking? Authorizing Provider   gabapentin (NEURONTIN) 300 MG capsule Take 300 mg by mouth 3 times daily. Historical Provider, MD   azelastine (ASTELIN) 0.1 % nasal spray 2 sprays by Nasal route 2 times daily 10/3/22   Historical Provider, MD   tamsulosin (FLOMAX) 0.4 MG capsule Take 0.4 mg by mouth daily 10/3/22   Historical Provider, MD   aspirin 325 MG tablet Take 325 mg by mouth 3 times daily as needed. Historical Provider, MD       REVIEW OF SYSTEMS    (2-9 systems for level 4, 10 or more for level 5)      Review of Systems   Constitutional:  Negative for chills and fever. Eyes:  Negative for discharge and redness. Respiratory:  Negative for shortness of breath. Cardiovascular:  Negative for chest pain. Gastrointestinal:  Positive for abdominal distention and abdominal pain. Genitourinary:  Negative for dysuria. Musculoskeletal:  Negative for arthralgias. Skin:  Negative for color change and rash. Neurological:  Negative for headaches. Psychiatric/Behavioral:  Negative for agitation and confusion. PHYSICAL EXAM   (up to 7 for level 4, 8 or more for level 5)     INITIAL VITALS:    height is 5' 4\" (1.626 m) and weight is 145 lb (65.8 kg). His temperature is 98.2 °F (36.8 °C). His blood pressure is 196/108 (abnormal) and his pulse is 91. His respiration is 16 and oxygen saturation is 97%. Physical Exam  Vitals and nursing note reviewed. Constitutional:       General: He is not in acute distress.      Appearance: He is well-developed. He is not toxic-appearing. HENT:      Head: Normocephalic and atraumatic. Nose: Nose normal.      Mouth/Throat:      Mouth: Mucous membranes are moist.   Eyes:      General: No scleral icterus. Conjunctiva/sclera: Conjunctivae normal.      Pupils: Pupils are equal, round, and reactive to light. Cardiovascular:      Rate and Rhythm: Normal rate and regular rhythm. Heart sounds: Normal heart sounds. No murmur heard. No friction rub. No gallop. Pulmonary:      Effort: Pulmonary effort is normal. No respiratory distress. Breath sounds: Normal breath sounds. No wheezing or rales. Abdominal:      General: There is distension. Palpations: Abdomen is soft. Tenderness: There is abdominal tenderness. Musculoskeletal:         General: Normal range of motion. Skin:     General: Skin is warm and dry. Findings: No erythema or rash. Neurological:      Mental Status: He is alert and oriented to person, place, and time.    Psychiatric:         Behavior: Behavior normal.       DIFFERENTIAL  DIAGNOSIS     PLAN (LABS / IMAGING / EKG):  Orders Placed This Encounter   Procedures    Culture, Urine    CT ABDOMEN PELVIS WO CONTRAST Additional Contrast? None    Urinalysis with Microscopic    Basic Metabolic Panel    CBC with Auto Differential    Diet NPO    Place in Observation Service       MEDICATIONS ORDERED:  Orders Placed This Encounter   Medications    DISCONTD: lidocaine (XYLOCAINE) 2 % uro-jet 5 mL    morphine sulfate (PF) injection 4 mg    ondansetron (ZOFRAN) injection 4 mg    0.9 % sodium chloride bolus    lidocaine (XYLOCAINE) 2 % uro-jet 5 mL    HYDROmorphone (DILAUDID) injection 0.5 mg    lidocaine (XYLOCAINE) 2 % uro-jet 5 mL    cefTRIAXone (ROCEPHIN) 2,000 mg in dextrose 5 % 50 mL IVPB mini-bag     Order Specific Question:   Antimicrobial Indications     Answer:   Urinary Tract Infection       DDX: Urinary retention versus traumatic Garcia dislodgment versus UTI    Initial MDM/Plan: 59 y.o. male who presents with traumatic Garcia dislodgment unable to urinate. Multiple attempts made to place Garcia catheter of various sizes. Discussed with Dr. Jaene Tucker who would recommend larger size and patient is typically using. Nursing was able to place but had minimal urine output 20 to 15 mL and significant amount of gross blood. DIAGNOSTIC RESULTS / EMERGENCY DEPARTMENT COURSE / MDM     LABS:  Labs Reviewed   BASIC METABOLIC PANEL - Abnormal; Notable for the following components:       Result Value    Glucose 226 (*)     BUN 37 (*)     Creatinine 1.55 (*)     Est, Glom Filt Rate 51 (*)     Bun/Cre Ratio 24 (*)     Sodium 133 (*)     All other components within normal limits   CBC WITH AUTO DIFFERENTIAL - Abnormal; Notable for the following components:    WBC 11.9 (*)     RDW 14.6 (*)     Seg Neutrophils 92 (*)     Lymphocytes 4 (*)     Eosinophils % 0 (*)     Segs Absolute 10.94 (*)     Absolute Lymph # 0.48 (*)     All other components within normal limits   CULTURE, URINE   URINALYSIS WITH MICROSCOPIC         RADIOLOGY:  CT ABDOMEN PELVIS WO CONTRAST Additional Contrast? None   Preliminary Result   Bilateral moderate hydronephrosis redemonstrated, similar to previous CT scan   finding on 12/04/2022. Bladder is markedly distended. Hypodensity around the bladder, indicating   edema and inflammatory changes. In the lower posterior bladder wall there   are some calcifications noted. Previous noted calcific densities in the   right posterolateral and right lateral bladder wall are mostly resolved with   minimal calcific foci at this time. No diagnostic finding in liver, gallbladder, pancreas, spleen, adrenal glands. Appendix not identified. Evidence of nonspecific small amount of gas scattered in proximal small bowel   with fluid levels. Moderate to large amount of stool and gas present in   proximal colon. No pneumoperitoneum. [facility] DEPARTMENT COURSE:  ED Course as of 01/01/23 0800   Sun Jan 01, 2023   0406 Bladder large on CT, YOAN. Some fluid around bladder [MS]   0615 Markedly distended on CT, nursing able to pass Garcia however unable to obtain urine suspect false track given multiple Garcia catheters. [MS]   0715 Creatinine(!): 1.55 [MS]      ED Course User Index  [MS] Val Dugan DO     CT obtained due to severe pain despite reported bladder scan of 0 which suspect is air CT imaging showing large distended bladder. Bilateral hydronephrosis. Mild YOAN with creatinine 1.5. I did rediscuss with urologist given given minimal output despite all interventions will admit this patient most likely will need cystoscopy or another urologic intervention has have concern that this Garcia catheter is not in the bladder. N.p.o. per Dr. Sara Gross. Started empiric Rocephin as patient has had multiple UTIs previously. No other infectious symptoms, no fever. Discussed with hospitalist for admission. PROCEDURES:  None    CONSULTS:  IP CONSULT TO UROLOGY    CRITICAL CARE:  0    FINAL IMPRESSION      1. Urinary retention    2. Gross hematuria          DISPOSITION / PLAN     DISPOSITION Admitted 01/01/2023 07:30:51 AM        PATIENTREFERRED TO:  No follow-up provider specified.     DISCHARGE MEDICATIONS:  New Prescriptions    No medications on file       Val Dugan DO  EmergencyMedicine Attending    (Please note that portions of this note were completed with a voice recognition program.  Efforts were made to edit the dictations but occasionally words are mis-transcribed.)       Val Dugan DO  01/01/23 4693

## 2023-01-01 NOTE — ANESTHESIA POSTPROCEDURE EVALUATION
Department of Anesthesiology  Postprocedure Note    Patient: Rodrigue Seaman  MRN: [id number]  YOB: 1965  Date of evaluation: 1/1/2023      Procedure Summary     Date: 01/01/23 Room / Location: [facility] - INPATIENT    Anesthesia Start: 1027 Anesthesia Stop: 1103    Procedure: CYSTOSCOPY EVACUATION OF CLOTS, REGALADO CATHETER INSERTION. (Urethra) Diagnosis:       Clot hematuria      (Clot hematuria [R31.0])    Surgeons: Lily Perkins MD Responsible Provider: Chayo Saleh DO    Anesthesia Type: MAC ASA Status: 3 - Emergent          Anesthesia Type: No value filed.     Amanda Phase I: Amanda Score: 5    Amanda Phase II:        Anesthesia Post Evaluation    Patient location during evaluation: PACU  Patient participation: complete - patient participated  Level of consciousness: awake and alert  Airway patency: patent  Nausea & Vomiting: no nausea and no vomiting  Complications: no  Cardiovascular status: hemodynamically stable  Respiratory status: acceptable  Hydration status: stable

## 2023-01-02 LAB
ABSOLUTE EOS #: 0 K/UL (ref 0–0.44)
ABSOLUTE IMMATURE GRANULOCYTE: 0.1 K/UL (ref 0–0.3)
ABSOLUTE LYMPH #: 0.61 K/UL (ref 1.1–3.7)
ABSOLUTE MONO #: 0.51 K/UL (ref 0.1–1.2)
ALBUMIN SERPL-MCNC: 3.1 G/DL (ref 3.5–5.2)
ALP BLD-CCNC: 56 U/L (ref 40–129)
ALT SERPL-CCNC: 11 U/L (ref 5–41)
ANION GAP SERPL CALCULATED.3IONS-SCNC: 9 MMOL/L (ref 9–17)
AST SERPL-CCNC: 17 U/L
BASOPHILS # BLD: 0 % (ref 0–2)
BASOPHILS ABSOLUTE: 0 K/UL (ref 0–0.2)
BILIRUB SERPL-MCNC: 0.3 MG/DL (ref 0.3–1.2)
BUN BLDV-MCNC: 31 MG/DL (ref 6–20)
BUN/CREAT BLD: 25 (ref 9–20)
CALCIUM SERPL-MCNC: 8.4 MG/DL (ref 8.6–10.4)
CHLORIDE BLD-SCNC: 108 MMOL/L (ref 98–107)
CO2: 23 MMOL/L (ref 20–31)
CREAT SERPL-MCNC: 1.23 MG/DL (ref 0.7–1.2)
CULTURE: NO GROWTH
EOSINOPHILS RELATIVE PERCENT: 0 % (ref 1–4)
GFR SERPL CREATININE-BSD FRML MDRD: >60 ML/MIN/1.73M2
GLUCOSE BLD-MCNC: 101 MG/DL (ref 70–99)
HCT VFR BLD CALC: 39.4 % (ref 40.7–50.3)
HEMOGLOBIN: 12.8 G/DL (ref 13–17)
IMMATURE GRANULOCYTES: 1 %
LYMPHOCYTES # BLD: 6 % (ref 24–43)
MCH RBC QN AUTO: 29.4 PG (ref 25.2–33.5)
MCHC RBC AUTO-ENTMCNC: 32.5 G/DL (ref 28.4–34.8)
MCV RBC AUTO: 90.6 FL (ref 82.6–102.9)
MONOCYTES # BLD: 5 % (ref 3–12)
NRBC AUTOMATED: 0 PER 100 WBC
PDW BLD-RTO: 15.2 % (ref 11.8–14.4)
PLATELET # BLD: 153 K/UL (ref 138–453)
PMV BLD AUTO: 8.8 FL (ref 8.1–13.5)
POTASSIUM SERPL-SCNC: 4.1 MMOL/L (ref 3.7–5.3)
RBC # BLD: 4.35 M/UL (ref 4.21–5.77)
SEG NEUTROPHILS: 88 % (ref 36–65)
SEGMENTED NEUTROPHILS ABSOLUTE COUNT: 8.88 K/UL (ref 1.5–8.1)
SODIUM BLD-SCNC: 140 MMOL/L (ref 135–144)
SPECIMEN DESCRIPTION: NORMAL
TOTAL PROTEIN: 5.9 G/DL (ref 6.4–8.3)
WBC # BLD: 10.1 K/UL (ref 3.5–11.3)

## 2023-01-02 PROCEDURE — 6360000002 HC RX W HCPCS: Performed by: INTERNAL MEDICINE

## 2023-01-02 PROCEDURE — 80053 COMPREHEN METABOLIC PANEL: CPT

## 2023-01-02 PROCEDURE — 6370000000 HC RX 637 (ALT 250 FOR IP): Performed by: INTERNAL MEDICINE

## 2023-01-02 PROCEDURE — 36415 COLL VENOUS BLD VENIPUNCTURE: CPT

## 2023-01-02 PROCEDURE — 6370000000 HC RX 637 (ALT 250 FOR IP): Performed by: UROLOGY

## 2023-01-02 PROCEDURE — 96366 THER/PROPH/DIAG IV INF ADDON: CPT

## 2023-01-02 PROCEDURE — 96365 THER/PROPH/DIAG IV INF INIT: CPT

## 2023-01-02 PROCEDURE — 96361 HYDRATE IV INFUSION ADD-ON: CPT

## 2023-01-02 PROCEDURE — 2580000003 HC RX 258: Performed by: UROLOGY

## 2023-01-02 PROCEDURE — G0378 HOSPITAL OBSERVATION PER HR: HCPCS

## 2023-01-02 PROCEDURE — 2580000003 HC RX 258: Performed by: INTERNAL MEDICINE

## 2023-01-02 PROCEDURE — 96367 TX/PROPH/DG ADDL SEQ IV INF: CPT

## 2023-01-02 PROCEDURE — 85025 COMPLETE CBC W/AUTO DIFF WBC: CPT

## 2023-01-02 RX ORDER — ACETAMINOPHEN 325 MG/1
650 TABLET ORAL EVERY 4 HOURS PRN
Status: DISCONTINUED | OUTPATIENT
Start: 2023-01-02 | End: 2023-01-03 | Stop reason: HOSPADM

## 2023-01-02 RX ADMIN — GABAPENTIN 300 MG: 300 CAPSULE ORAL at 09:24

## 2023-01-02 RX ADMIN — SODIUM CHLORIDE: 9 INJECTION, SOLUTION INTRAVENOUS at 14:55

## 2023-01-02 RX ADMIN — AZELASTINE HYDROCHLORIDE 2 SPRAY: 137 SPRAY, METERED NASAL at 20:12

## 2023-01-02 RX ADMIN — AZELASTINE HYDROCHLORIDE 2 SPRAY: 137 SPRAY, METERED NASAL at 09:24

## 2023-01-02 RX ADMIN — ACETAMINOPHEN 650 MG: 325 TABLET ORAL at 16:29

## 2023-01-02 RX ADMIN — CIPROFLOXACIN 400 MG: 2 INJECTION, SOLUTION INTRAVENOUS at 14:55

## 2023-01-02 RX ADMIN — GABAPENTIN 300 MG: 300 CAPSULE ORAL at 22:04

## 2023-01-02 RX ADMIN — TAMSULOSIN HYDROCHLORIDE 0.4 MG: 0.4 CAPSULE ORAL at 09:24

## 2023-01-02 RX ADMIN — CIPROFLOXACIN 400 MG: 2 INJECTION, SOLUTION INTRAVENOUS at 02:14

## 2023-01-02 RX ADMIN — GABAPENTIN 300 MG: 300 CAPSULE ORAL at 14:54

## 2023-01-02 RX ADMIN — CEFEPIME 2000 MG: 2 INJECTION, POWDER, FOR SOLUTION INTRAVENOUS at 16:56

## 2023-01-02 NOTE — PLAN OF CARE
Problem: Pain  Goal: Verbalizes/displays adequate comfort level or baseline comfort level  1/2/2023 1140 by Bhupinder Krishnamurthy RN  Outcome: Progressing  Flowsheets (Taken 1/2/2023 1140)  Verbalizes/displays adequate comfort level or baseline comfort level:   Encourage patient to monitor pain and request assistance   Assess pain using appropriate pain scale   Administer analgesics based on type and severity of pain and evaluate response   Implement non-pharmacological measures as appropriate and evaluate response  1/2/2023 0051 by Armin Garnett RN  Note: Patient is free from pain and will call out for needs

## 2023-01-02 NOTE — CARE COORDINATION
Case Management Initial Discharge Plan  Tone Weston,             Met with:patient to discuss discharge plans. Information verified: address, contacts, phone number, , insurance Yes  PCP: Jewell Staples MD  Date of last visit: 10-3-2022    Insurance Provider: [facility]    Discharge Planning    Living Arrangements:  Spouse/Significant Other   Support Systems:  Spouse/Significant Other    Home has 1 stories  1 stairs to climb to get into front door, 0 stairs to climb to reach second floor  Location of bedroom/bathroom in home  - main floor    Patient able to perform ADL's:Independent    Current Services (outpatient & in home) none  DME equipment: catheter supplies  DME provider: N/A    Ph[facility]: [facility] [place] [facility]    Potential Assistance Needed:  N/A    Patient agreeable to home care: No  [place] of choice provided:  n/a    Prior SNF/Rehab Placement and Facility: No  Agreeable to SNF/Rehab: No  [place] of choice provided: n/a   Evaluation: n/a    Expected Discharge date:     Patient expects to be discharged to:   home  Follow Up Appointment: Best Day/ Time: Wednesday AM    Transportation provider: girlfriend  Transportation arrangements needed for discharge: No    Readmission Risk              Risk of Unplanned Readmission:  0             Does patient have a readmission risk score greater than 14?: No  If yes, follow-up appointment must be made within 7 days of discharge. Goal of Care:       Discharge Plan: Met with patient at bedside. Lives with girlfriend, independent and drives. Has history of BPH and urinary retention and has had henderson in place since . Follows with urologist Dr. Boom Burroughs. POD #1 cysto with clot evacuation and new henderson catheter placed per Dr. Ofelia Handley. Had recent D/C 12- and had been treated for sepsis, urinary retention and UTI. Declines [facility] needs and pt will follow with Dr. Boom Burroughs.                 Electronically signed by Katharina Heredia RN on 1/2/23 at 8:51 AM EST

## 2023-01-02 NOTE — PLAN OF CARE
Problem: Risk for Elopement  Goal: Patient will not exit the unit/facility without proper excort  1/2/2023 0051 by Suzy Dunbar RN  Outcome: Progressing  Note: Patient has made no elopement comments this shift. Problem: ABCDS Injury Assessment  Goal: Absence of physical injury  1/2/2023 0051 by Suzy Dunbar RN  Outcome: Progressing  Note: Patient is free from injury this shift. Patient has been alarm in place for safety.       Problem: Pain  Goal: Verbalizes/displays adequate comfort level or baseline comfort level  1/2/2023 0051 by Suzy Dunbar RN  Note: Patient is free from pain and will call out for needs

## 2023-01-02 NOTE — PROGRESS NOTES
Patient's temp 102.  Patient refused ice packs or to remove blankets because \"he does not like to be cold\"

## 2023-01-02 NOTE — PROGRESS NOTES
[facility]  Office: [address], DO, Chris Tomer, DO, Janie Lange, DO, Diana Rascon, DO, Santosh Gerardo MD, Johnson Ha MD, Bashir Payne MD, Shira Mayer MD,  Jhon Gerardo MD, Pancho Sellers MD, Leanne Hillman, DO, Felicia Zamarripa MD,  Manuel Mancera MD, Gisselle Bueno MD, William Lambert DO, Cate Hoang MD, Maciel Melendez MD, Jihan Baptiste DO, Alka Ortiz MD, Pallavi England MD, Yovanny Magallon MD, Leanne Harris MD, Anna Loya, DO, Monik Dumont MD, Florentin Kearney MD, Dar Oden, CNP,  Lashay Banks, CNP, Parisa Negus, CNP, Helen Eulalio, CNP,  Niki Mckinnon, [facility], Ivan Bedoya, CNP, Bhavani Munoz, CNP, [place], CNP, [employer], [facility], Alecia Base, CNP, Bandar Quiros PA-C, Carl Ivan, CNS, Rey Dangelo, CNP, Indira Nurse, [facility]         [facility] [facility]    Progress Note    1/2/2023    4:05 PM    Name:   Satya Sparks  MRN:     [id number]     Acct:      [de-identified]   Room:   2012/2012-02   Day:  0  Admit Date:  1/1/2023  1:43 AM    PCP:   Tracie Concepcion MD  Code Status:  Full Code    Subjective:     C/C:   Chief Complaint   Patient presents with    Urinary Catheter     Interval History Status: improved. Pt ate his entire dinner this evening, but is now drowsy. He had a fever T 102 earlier today. Brief History:     Per my partner:  Ema Johnson is a 59 y.o. Non- / non  male who presents with Urinary Catheter   and is admitted to the hospital for the management of Displacement of Henderson catheter, initial encounter [facility]). This 59 yom presented with displacement of his chronic indwelling henderson. He was unable to urinate after it came out. Attempts made to replace it in ER but unsuccessful so taken to OR and had cysto-had gross hematuria upon placement of henderson. He has had recurrent urosepsis with different bacteria.   He is too sleepy postop to get any additional history\"    Review of Systems:     Constitutional:  Positive  for chills, fevers, no sweats  Respiratory:  negative for cough, dyspnea on exertion, shortness of breath, wheezing  Cardiovascular:  negative for chest pain, chest pressure/discomfort, lower extremity edema, palpitations  Gastrointestinal:  negative for abdominal pain, constipation, diarrhea, nausea, vomiting  Neurological:  negative for dizziness, headache    Medications: Allergies: Allergies   Allergen Reactions    Sulfa Antibiotics Swelling     And rash    Amoxicillin Nausea And Vomiting       Current Meds:   Scheduled Meds:    cefepime  2,000 mg IntraVENous Q24H    gabapentin  300 mg Oral TID    tamsulosin  0.4 mg Oral Daily    azelastine  2 spray Each Nostril BID    sodium chloride flush  5-40 mL IntraVENous 2 times per day     Continuous Infusions:    sodium chloride      sodium chloride 75 mL/hr at 23 1455     PRN Meds: acetaminophen, sodium chloride flush, sodium chloride, [MAR Hold] ondansetron **OR** [MAR Hold] ondansetron, [MAR Hold] acetaminophen **OR** [MAR Hold] acetaminophen    Data:     Past Medical History:   has a past medical history of BPH with obstruction/lower urinary tract symptoms, Chronic ear infection, and Renal failure. Social History:   reports that he has been smoking cigarettes. He has a 17.50 pack-year smoking history. He has never used smokeless tobacco. He reports that he does not drink alcohol and does not use drugs.      Family History:   Family History   Problem Relation Age of Onset    High Blood Pressure Mother     Lung Cancer Mother     Stomach Cancer Father     Pancreatic Cancer Paternal Grandmother     Pancreatic Cancer Other        Vitals:  /75   Pulse 95   Temp (!) 102 °F (38.9 °C) (Oral)   Resp 17   Ht 5' 4\" (1.626 m)   Wt 145 lb (65.8 kg)   SpO2 97%   BMI 24.89 kg/m²   Temp (24hrs), Av.6 °F (37.6 °C), Min:98.2 °F (36.8 °C), Max:102 °F (38.9 °C)    No results for input(s): POCGLU in the last 72 hours. I/O (24Hr): Intake/Output Summary (Last 24 hours) at 1/2/2023 1605  Last data filed at 1/2/2023 1246  Gross per 24 hour   Intake 1848.15 ml   Output 2550 ml   Net -701.85 ml       Labs:  Hematology:  Recent Labs     01/01/23 0211 01/02/23 0623   WBC 11.9* 10.1   RBC 5.24 4.35   HGB 15.2 12.8*   HCT 47.0 39.4*   MCV 89.7 90.6   MCH 29.0 29.4   MCHC 32.3 32.5   RDW 14.6* 15.2*    153   MPV 8.6 8.8     Chemistry:  Recent Labs     01/01/23 0211 01/02/23 0623   * 140   K 4.1 4.1   CL 98 108*   CO2 22 23   GLUCOSE 226* 101*   BUN 37* 31*   CREATININE 1.55* 1.23*   ANIONGAP 13 9   LABGLOM 51* >60   CALCIUM 9.4 8.4*     Recent Labs     01/02/23 0623   PROT 5.9*   LABALBU 3.1*   AST 17   ALT 11   ALKPHOS 56   BILITOT 0.3     ABG:No results found for: POCPH, PHART, PH, POCPCO2, SKN9UNO, PCO2, POCPO2, PO2ART, PO2, POCHCO3, ZGW4JOO, HCO3, NBEA, PBEA, BEART, BE, THGBART, THB, FGG4PIW, OIYP0UHI, R9HJVTMZ, O2SAT, FIO2  Lab Results   Component Value Date/Time    SPECIAL LFA,12ML 12/04/2022 01:07 PM     Lab Results   Component Value Date/Time    CULTURE ENTEROBACTER CLOACAE COMPLEX >[id number] CFU/ML (A) 12/17/2022 10:52 AM       Radiology:  CT ABDOMEN PELVIS WO CONTRAST Additional Contrast? None    Result Date: 1/1/2023  Bilateral moderate hydronephrosis redemonstrated, similar to previous CT scan finding on 12/04/2022. Bladder is markedly distended. Hypodensity around the bladder, indicating edema and inflammatory changes. In the lower posterior bladder wall there are some calcifications noted. Previous noted calcific densities in the right posterolateral and right lateral bladder wall are mostly resolved with minimal calcific foci at this time. No diagnostic finding in liver, gallbladder, pancreas, spleen, adrenal glands. Appendix not identified. Evidence of nonspecific small amount of gas scattered in proximal small bowel with fluid levels.   Moderate to large amount of stool and gas present in proximal colon. No pneumoperitoneum.        Physical Examination:        General appearance:  drowsy, cooperative and no distress  Mental Status:  oriented to person, place and time and normal affect  Lungs:  clear to auscultation bilaterally, normal effort  Heart:  regular rate and rhythm, no murmur  Abdomen:  soft, nontender, nondistended, normal bowel sounds, no masses, hepatomegaly, splenomegaly  Extremities:  no edema, redness, tenderness in the calves  Skin:  no gross lesions, rashes, induration    Assessment:        Hospital Problems             Last Modified POA    * (Principal) [facility]placement [facility] Henderson [facility] 1/1/2023 Yes    Urinary tract infection associated with indwelling urethral catheter ([facility] 1/1/2023 Yes    Retention of urine 1/1/2023 Yes    Gross hematuria 1/1/2023 Yes    YOAN (acute kidney injury) ([facility] 1/1/2023 Yes    Tobacco abuse 1/1/2023 Yes       Plan:        Displacement of henderson catheter s/p removal in OR and cystoscopy with replacement by Urology- on Flomax  UTI- culture pending, change IV Cipro to Cefepime since he is still febrile, add Tylenol prn  YOAN- improving, on IVF, repeat BMP tomorrow  Tobacco abuse- encourage cessation      Shankar Cooney MD  1/2/2023  4:05 PM

## 2023-01-02 NOTE — PROGRESS NOTES
Patient receives Sacrament of the Sick (anointing) from [facility].    [facility] will follow as needed. (writer charting for [employer].)   PT: was sleeping, PT: was anointed for the sacrament of the sick by Fr. Susana Corbin. 01/02/23 1055   Encounter Summary   Encounter Overview/Reason  Sasha Rangel Encounter  (PT: anointed, PT: sleeping)   Service Provided For: Patient   Referral/Consult From: Justina   Last Encounter  01/02/23   Rituals, Rites and Sacraments   Type Sacrament of Sick; Anointing

## 2023-01-03 VITALS
HEIGHT: 64 IN | OXYGEN SATURATION: 99 % | DIASTOLIC BLOOD PRESSURE: 86 MMHG | WEIGHT: 145 LBS | BODY MASS INDEX: 24.75 KG/M2 | RESPIRATION RATE: 15 BRPM | HEART RATE: 97 BPM | SYSTOLIC BLOOD PRESSURE: 120 MMHG | TEMPERATURE: 99 F

## 2023-01-03 PROBLEM — J20.9 ACUTE BRONCHITIS: Status: ACTIVE | Noted: 2023-01-03

## 2023-01-03 LAB
ANION GAP SERPL CALCULATED.3IONS-SCNC: 6 MMOL/L (ref 9–17)
BUN BLDV-MCNC: 19 MG/DL (ref 6–20)
BUN/CREAT BLD: 19 (ref 9–20)
CALCIUM SERPL-MCNC: 8.1 MG/DL (ref 8.6–10.4)
CHLORIDE BLD-SCNC: 109 MMOL/L (ref 98–107)
CO2: 25 MMOL/L (ref 20–31)
CREAT SERPL-MCNC: 0.99 MG/DL (ref 0.7–1.2)
GFR SERPL CREATININE-BSD FRML MDRD: >60 ML/MIN/1.73M2
GLUCOSE BLD-MCNC: 105 MG/DL (ref 70–99)
POTASSIUM SERPL-SCNC: 3.9 MMOL/L (ref 3.7–5.3)
SODIUM BLD-SCNC: 140 MMOL/L (ref 135–144)

## 2023-01-03 PROCEDURE — 36415 COLL VENOUS BLD VENIPUNCTURE: CPT

## 2023-01-03 PROCEDURE — 80048 BASIC METABOLIC PNL TOTAL CA: CPT

## 2023-01-03 PROCEDURE — 2580000003 HC RX 258: Performed by: UROLOGY

## 2023-01-03 PROCEDURE — 6370000000 HC RX 637 (ALT 250 FOR IP): Performed by: UROLOGY

## 2023-01-03 PROCEDURE — G0378 HOSPITAL OBSERVATION PER HR: HCPCS

## 2023-01-03 PROCEDURE — 6370000000 HC RX 637 (ALT 250 FOR IP): Performed by: INTERNAL MEDICINE

## 2023-01-03 PROCEDURE — 96361 HYDRATE IV INFUSION ADD-ON: CPT

## 2023-01-03 RX ORDER — DOXYCYCLINE HYCLATE 100 MG
100 TABLET ORAL 2 TIMES DAILY
Qty: 14 TABLET | Refills: 0 | Status: SHIPPED | OUTPATIENT
Start: 2023-01-03 | End: 2023-01-10

## 2023-01-03 RX ORDER — DIPHENHYDRAMINE HCL 25 MG
30 CAPSULE ORAL EVERY 6 HOURS PRN
Refills: 0 | COMMUNITY
Start: 2023-01-03

## 2023-01-03 RX ADMIN — GABAPENTIN 300 MG: 300 CAPSULE ORAL at 09:06

## 2023-01-03 RX ADMIN — ACETAMINOPHEN 650 MG: 325 TABLET ORAL at 02:28

## 2023-01-03 RX ADMIN — GABAPENTIN 300 MG: 300 CAPSULE ORAL at 14:24

## 2023-01-03 RX ADMIN — SODIUM CHLORIDE: 9 INJECTION, SOLUTION INTRAVENOUS at 05:53

## 2023-01-03 RX ADMIN — AZELASTINE HYDROCHLORIDE 2 SPRAY: 137 SPRAY, METERED NASAL at 09:06

## 2023-01-03 RX ADMIN — TAMSULOSIN HYDROCHLORIDE 0.4 MG: 0.4 CAPSULE ORAL at 09:06

## 2023-01-03 ASSESSMENT — PAIN SCALES - GENERAL: PAINLEVEL_OUTOF10: 0

## 2023-01-03 NOTE — DISCHARGE INSTR - DIET

## 2023-01-03 NOTE — PROGRESS NOTES
Patient states he had money that was in his socks located in his belongings bag. Patient states money no longer there. Writer helped family search through belongings. A $20 bill was found. patient states he had over $1000. . writer informed nurse manager Florinda Sun whom notified security to file report.

## 2023-01-03 NOTE — PROGRESS NOTES
Dr. Mariluz Saab called updated on patient status.  He states he's ok for discharge f/u next week with Dr. Armaan Antonio

## 2023-01-03 NOTE — PROGRESS NOTES
Urology Progress Note    Subjective: Fever to 102F. On Cefepime. Had enterobacter in urine from 12/17. S/p cystoscopy by Dr. Alfreda Mcclelland.     Patient Vitals for the past 24 hrs:   BP Temp Temp src Pulse Resp SpO2   01/02/23 2000 106/65 98.5 °F (36.9 °C) Oral 72 18 100 %   01/02/23 1732 -- 99.3 °F (37.4 °C) Oral -- -- --   01/02/23 1527 108/75 (!) 102 °F (38.9 °C) Oral 95 17 97 %   01/02/23 1120 130/82 99.1 °F (37.3 °C) Oral 100 18 96 %   01/02/23 0737 121/73 98.2 °F (36.8 °C) Oral 93 18 94 %       Intake/Output Summary (Last 24 hours) at 1/2/2023 2106  Last data filed at 1/2/2023 1742  Gross per 24 hour   Intake 2833.23 ml   Output 2425 ml   Net 408.23 ml       Recent Labs     01/01/23  0211 01/02/23  0623   WBC 11.9* 10.1   HGB 15.2 12.8*   HCT 47.0 39.4*   MCV 89.7 90.6    153     Recent Labs     01/01/23  0211 01/02/23  0623   * 140   K 4.1 4.1   CL 98 108*   CO2 22 23   BUN 37* 31*   CREATININE 1.55* 1.23*       Recent Labs     01/01/23  1345   COLORU Yellow   PHUR 7.0   WBCUA 48    RBCUA 20 TO 50   SPECGRAV 1.010   LEUKOCYTESUR LARGE*   UROBILINOGEN Normal   BILIRUBINUR NEGATIVE       Additional Lab/culture results:    Physical Exam: soft, nontender, nondistended, no masses or organomegaly scrotal contents normal to inspection and palpation    Interval Imaging Findings:    Impression: Retention    Patient Active Problem List   Diagnosis    Benign prostatic hyperplasia with urinary retention    Nephrolithiasis    Acute kidney injury superimposed on chronic kidney disease ([address]    Hydronephrosis    Abnormal CT of the abdomen    Bladder outlet obstruction    Urinary tract infection associated with indwelling urethral catheter ([address]    Sepsis due to Proteus species ([address]    Bacteremia due to Proteus species    Retention of urine    Urinary tract infection with hematuria    Displacement of Garcia catheter, initial encounter ([facility]    Gross hematuria    YOAN (acute kidney injury) ([facility]    Tobacco abuse Plan: Continue henderson. Follow up on urine culture. Continue Cefepime.     Colton Titus MD  9:06 PM 1/2/2023

## 2023-01-03 NOTE — PLAN OF CARE
Problem: Discharge Planning  Goal: Discharge to home or other facility with appropriate resources  Outcome: Progressing  Flowsheets (Taken 1/2/2023 1955 by Etta Vazquez, RN)  Discharge to home or other facility with appropriate resources:   Identify barriers to discharge with patient and caregiver   Arrange for needed discharge resources and transportation as appropriate   Identify discharge learning needs (meds, wound care, etc)     Problem: Risk for Elopement  Goal: Patient will not exit the unit/facility without proper excort  Outcome: Progressing     Problem: ABCDS Injury Assessment  Goal: Absence of physical injury  Outcome: Progressing     Problem: Pain  Goal: Verbalizes/displays adequate comfort level or baseline comfort level  Outcome: Progressing     Problem: Neurosensory - Adult  Goal: Achieves maximal functionality and self care  Outcome: Progressing  Flowsheets (Taken 1/2/2023 1955 by Etta Vazquez RN)  Achieves maximal functionality and self care: Encourage and assist patient to increase activity and self care with guidance from physical therapy/occupational therapy     Problem: Genitourinary - Adult  Goal: Urinary catheter remains patent  Outcome: Progressing     Problem: Infection - Adult  Goal: Absence of infection at discharge  Outcome: Progressing     Problem: Metabolic/Fluid and Electrolytes - Adult  Goal: Electrolytes maintained within normal limits  Outcome: Progressing  Flowsheets (Taken 1/2/2023 1955 by Etta Vazquez RN)  Electrolytes maintained within normal limits:   Monitor labs and assess patient for signs and symptoms of electrolyte imbalances   Administer electrolyte replacement as ordered   Monitor response to electrolyte replacements, including repeat lab results as appropriate  Goal: Hemodynamic stability and optimal renal function maintained  Outcome: Progressing  Flowsheets (Taken 1/2/2023 1955 by Etta Vazquez RN)  Hemodynamic stability and optimal renal function maintained:   Monitor labs and assess for signs and symptoms of volume excess or deficit   Monitor intake, output and patient weight   Monitor urine specific gravity, serum osmolarity and serum sodium as indicated or ordered  Goal: Glucose maintained within prescribed range  Outcome: Progressing  Flowsheets (Taken 1/2/2023 1955 by Caitlin Archer RN)  Glucose maintained within prescribed range:   Monitor blood glucose as ordered   Assess for signs and symptoms of hyperglycemia and hypoglycemia   Administer ordered medications to maintain glucose within target range

## 2023-01-03 NOTE — DISCHARGE SUMMARY
[facility]  Office: [address], DO, Chris Jeff, DO, Janie Lange, DO, Diana Rascon, DO, Santosh Gerardo MD, Johnson Ha MD, Bashir Payne MD, Shira Mayer MD,  Jhon Gerardo MD, Pancho Sellers MD, Leanne Hillman, DO, Felicia Zamarripa MD,  Manuel Mancera MD, Gisselle Bueno MD, William Lambert DO, Cate Hoang MD, Maciel Melendez MD, Jihan Baptiste DO, Alka Ortiz MD, Pallavi England MD, Yovanny Magallon MD, Leanne Harris MD, Anna Loya, DO, Monik Dumont MD, Florentin Kearney MD, Dar Oden, CNP,  Lashay Banks, CNP, Parisa Negus, CNP, Helen Eulalio, CNP,  Niki Mckinnon, [facility], Ivan Camera, CNP, Bhavani [place], CNP, Sorenson [place]ab, CNP, [employer], [facility], Alecia Base, CNP, Bandar Quiros PA-C, Carl Ivan, CNS, Rey Dangelo, CNP, Indira Zuluaga, [facility]    Discharge Summary     Patient ID: Satya Sparks  :  1965   MRN: [id number]     ACCOUNT:  [de-identified]   Patient's PCP: Tracie Concepcion MD  Admit Date: 2023   Discharge Date: 1/3/2023     Length of Stay: 0  Code Status:  Full Code  Admitting Physician: No admitting provider for patient encounter. Discharge Physician: Tori Hilliard MD     Active Discharge Diagnoses:     Hospital Problem Lists:  Principal Problem:    Displacement of Garcia catheter, initial encounter [facility])  Active Problems:    Urinary tract infection associated with indwelling urethral catheter ([facility]    Retention of urine    Gross hematuria    YOAN (acute kidney injury) ([facility]    Tobacco abuse    Acute bronchitis  Resolved Problems:    * No resolved hospital problems.  *      Admission Condition:  poor     Discharged Condition: good    Hospital Stay:     Hospital Course:  Satya Sparks is a 59 y.o. male who was admitted for the management of  Displacement of Garcia catheter, initial encounter [facility]) , presented to ER with Urinary Catheter          Significant therapeutic interventions: ***    Significant Diagnostic Studies:   Labs / Micro:  {LDZR:[id number]}   ***  Radiology:  CT ABDOMEN PELVIS WO CONTRAST Additional Contrast? None    Result Date: 1/1/2023  Bilateral moderate hydronephrosis redemonstrated, similar to previous CT scan finding on 12/04/2022. Bladder is markedly distended. Hypodensity around the bladder, indicating edema and inflammatory changes. In the lower posterior bladder wall there are some calcifications noted. Previous noted calcific densities in the right posterolateral and right lateral bladder wall are mostly resolved with minimal calcific foci at this time. No diagnostic finding in liver, gallbladder, pancreas, spleen, adrenal glands. Appendix not identified. Evidence of nonspecific small amount of gas scattered in proximal small bowel with fluid levels. Moderate to large amount of stool and gas present in proximal colon. No pneumoperitoneum. Consultations:    Consults:     Final Specialist Recommendations/Findings:   IP CONSULT TO UROLOGY      The patient was seen and examined on day of discharge and this discharge summary is in conjunction with any daily progress note from day of discharge.     Discharge plan:     Disposition: {DISPOSITIONS:402258557}    Physician Follow Up:   MD Hakeem Munson [address] [place] [address]  [phone]    Schedule an appointment as soon as possible for a visit in 1 week(s)         Requiring Further Evaluation/Follow Up POST HOSPITALIZATION/Incidental Findings: ***    Diet: {diet:31658}    Activity: As tolerated***    Instructions to Patient: ***    Discharge Medications:      Medication List        START taking these medications      doxycycline hyclate 100 MG tablet  Commonly known as: VIBRA-TABS  Take 1 tablet by mouth 2 times daily for 7 days     Kathy DayQuil Severe Cold/Flu 5--325 MG/15ML Liqd  Generic drug: Phenylephrine-DM-GG-APAP  Take 30 mLs by mouth every 6 hours as needed (cough and stuff nose)            CONTINUE taking these medications      azelastine 0.1 % nasal spray  Commonly known as: ASTELIN     gabapentin 300 MG capsule  Commonly known as: NEURONTIN     tamsulosin 0.4 MG capsule  Commonly known as: FLOMAX            STOP taking these medications      aspirin 325 MG tablet               Where to Get Your Medications        These medications were sent to [facility], [address] [place] [address], [address] [place] [id number]      Phone: [phone]   doxycycline hyclate 100 MG tablet       You can get these medications from any pharmacy    You don't need a prescription for these medications  Kathy DayQuil Severe Cold/Flu 5--325 MG/15ML Liqd         No discharge procedures on file. Time Spent on discharge is  {Blank single:00611::\"15 mins\",\"17 mins\",\"20 mins\",\"31 mins\",\"32 mins\",\"33 mins\",\"34 mins\",\"35 mins\",\"36 mins\",\"37 mins\",\"38 mins\",\"39 mins\",\"40 mins\",\"41 mins\",\"43 mins\",\"45 mins\",\"50 mins\",\"***\"} in patient examination, evaluation, counseling as well as medication reconciliation, prescriptions for required medications, discharge plan and follow up. Electronically signed by   Elizabeth Nguyen MD  1/3/2023  4:12 PM      Thank you Dr. Lemuel Chavira MD for the opportunity to be involved in this patient's care.

## 2023-01-03 NOTE — PLAN OF CARE
Problem: Pain  Goal: Verbalizes/displays adequate comfort level or baseline comfort level  1/3/2023 1314 by Adriana Sequeira RN  Outcome: Progressing  Flowsheets (Taken 1/3/2023 1314)  Verbalizes/displays adequate comfort level or baseline comfort level:   Encourage patient to monitor pain and request assistance   Assess pain using appropriate pain scale   Administer analgesics based on type and severity of pain and evaluate response   Implement non-pharmacological measures as appropriate and evaluate response  1/3/2023 0510 by Brent Matta RN  Outcome: Progressing

## 2023-01-03 NOTE — ADT AUTH CERT
Hi, Disragard H&P, Clinicals 01.01, 01.02.  Currently the patient is still OBSERVATION. I will resend when and if Admitted.   Thanks

## 2023-01-03 NOTE — PROGRESS NOTES
[facility]  Office: [address], DO, Miguel Latha, DO, Brindanora Menchaca, DO, Ирина Odette Rascon, DO, Alejandrina Javed MD, Dio Pean MD, Chiquis Rivero MD, Phu Theodore MD,  Bashir Langston MD, Fausto Ramírez MD, Bettina Carvalho, DO, Dinah Maza MD,  Koki Jackson MD, Francesca Rosario MD, Everett Driver, DO, Ajith Harp MD, Hilton Hurd MD, Marcia Ramírez, DO, Christi Trotter MD, Silvia Fatima MD, Radha Aguilar MD, Mahnaz Gipson MD, Ewelina Sanches, DO, Lynnette Jordan MD, Tommy Mar MD, Stiven Brasher, CNP,  Cosme Tariq, CNP, Sohan Lara, CNP, Danni Boss, CNP,  Koby Galan, [facility], Shady Powell, CNP, Kiera Mendez, CNP, Angela Leal, CNP, Halina Frederick, CNP, Rupa Ruiz, CNP, Matteo Alan, PAColletteC, Osorio Barreto, CNS, Quang Brush, [facility], [facility]    Progress Note    1/3/2023    4:05 PM    Name:   Jason Cee  MRN:     [id number]     Acct:      [de-identified]   Room:   2012/2012-02  IP Day:  0  Admit Date:  1/1/2023  1:43 AM    PCP:   Brigitte Fisher MD  Code Status:  Full Code    Subjective:     C/C:   Chief Complaint   Patient presents with    Urinary Catheter     Interval History Status: improved. No more fevers. Patient is sitting up in bed on the phone ready to go home. He is coughing and has stuffiness. He is c/o a head cold. Brief History:     Per my partner:  Candice Crook is a 59 y.o. Non- / non  male who presents with Urinary Catheter   and is admitted to the hospital for the management of Displacement of Henderson catheter, initial encounter [facility]). This 59 yom presented with displacement of his chronic indwelling henderson. He was unable to urinate after it came out. Attempts made to replace it in ER but unsuccessful so taken to OR and had cysto-had gross hematuria upon placement of henderson.   He has had recurrent urosepsis with different bacteria. He is too sleepy postop to get any additional history\"    Review of Systems:     Constitutional:  Negative  for chills, fevers, no sweats  Respiratory:  Positive for cough, no dyspnea on exertion, shortness of breath, wheezing  Cardiovascular:  negative for chest pain, chest pressure/discomfort, lower extremity edema, palpitations  Gastrointestinal:  negative for abdominal pain, constipation, diarrhea, nausea, vomiting  Neurological:  negative for dizziness, headache    Medications: Allergies: Allergies   Allergen Reactions    Sulfa Antibiotics Swelling     And rash    Amoxicillin Nausea And Vomiting       Current Meds:   Scheduled Meds:    gabapentin  300 mg Oral TID    tamsulosin  0.4 mg Oral Daily    azelastine  2 spray Each Nostril BID    sodium chloride flush  5-40 mL IntraVENous 2 times per day     Continuous Infusions:    sodium chloride       PRN Meds: acetaminophen, sodium chloride flush, sodium chloride, [MAR Hold] ondansetron **OR** [MAR Hold] ondansetron, [MAR Hold] acetaminophen **OR** [MAR Hold] acetaminophen    Data:     Past Medical History:   has a past medical history of BPH with obstruction/lower urinary tract symptoms, Chronic ear infection, and Renal failure. Social History:   reports that he has been smoking cigarettes. He has a 17.50 pack-year smoking history. He has never used smokeless tobacco. He reports that he does not drink alcohol and does not use drugs.      Family History:   Family History   Problem Relation Age of Onset    High Blood Pressure Mother     Lung Cancer Mother     Stomach Cancer Father     Pancreatic Cancer Paternal Grandmother     Pancreatic Cancer Other        Vitals:  /86   Pulse 97   Temp 99 °F (37.2 °C) (Oral)   Resp 15   Ht 5' 4\" (1.626 m)   Wt 145 lb (65.8 kg)   SpO2 99%   BMI 24.89 kg/m²   Temp (24hrs), Av.7 °F (37.1 °C), Min:97.3 °F (36.3 °C), Max:101.1 °F (38.4 °C)    No results for input(s): POCGLU in the last 67 hours.    I/O (24Hr): Intake/Output Summary (Last 24 hours) at 1/3/2023 1605  Last data filed at 1/3/2023 1316  Gross per 24 hour   Intake 1465.08 ml   Output 4275 ml   Net -2809.92 ml       Labs:  Hematology:  Recent Labs     01/01/23 0211 01/02/23  0623   WBC 11.9* 10.1   RBC 5.24 4.35   HGB 15.2 12.8*   HCT 47.0 39.4*   MCV 89.7 90.6   MCH 29.0 29.4   MCHC 32.3 32.5   RDW 14.6* 15.2*    153   MPV 8.6 8.8     Chemistry:  Recent Labs     01/01/23 0211 01/02/23 0623 01/03/23  0614   * 140 140   K 4.1 4.1 3.9   CL 98 108* 109*   CO2 22 23 25   GLUCOSE 226* 101* 105*   BUN 37* 31* 19   CREATININE 1.55* 1.23* 0.99   ANIONGAP 13 9 6*   LABGLOM 51* >60 >60   CALCIUM 9.4 8.4* 8.1*     Recent Labs     01/02/23  0623   PROT 5.9*   LABALBU 3.1*   AST 17   ALT 11   ALKPHOS 56   BILITOT 0.3     ABG:No results found for: POCPH, PHART, PH, POCPCO2, GZJ7USJ, PCO2, POCPO2, PO2ART, PO2, POCHCO3, LWG6XQW, HCO3, NBEA, PBEA, BEART, BE, THGBART, THB, TPD4QCG, YHUH2XER, C0AQGCHB, O2SAT, FIO2  Lab Results   Component Value Date/Time    SPECIAL LFA,12ML 12/04/2022 01:07 PM     Lab Results   Component Value Date/Time    CULTURE NO GROWTH 01/01/2023 01:45 PM       Radiology:  CT ABDOMEN PELVIS WO CONTRAST Additional Contrast? None    Result Date: 1/1/2023  Bilateral moderate hydronephrosis redemonstrated, similar to previous CT scan finding on 12/04/2022. Bladder is markedly distended. Hypodensity around the bladder, indicating edema and inflammatory changes. In the lower posterior bladder wall there are some calcifications noted. Previous noted calcific densities in the right posterolateral and right lateral bladder wall are mostly resolved with minimal calcific foci at this time. No diagnostic finding in liver, gallbladder, pancreas, spleen, adrenal glands. Appendix not identified. Evidence of nonspecific small amount of gas scattered in proximal small bowel with fluid levels.   Moderate to large amount of stool and gas present in proximal colon. No pneumoperitoneum.        Physical Examination:        General appearance:  drowsy, cooperative and no distress  Mental Status:  oriented to person, place and time and normal affect  Lungs:  clear to auscultation bilaterally, normal effort  Heart:  regular rate and rhythm, no murmur  Abdomen:  soft, nontender, nondistended, normal bowel sounds, no masses, hepatomegaly, splenomegaly  Extremities:  no edema, redness, tenderness in the calves  Skin:  no gross lesions, rashes, induration    Assessment:        Hospital Problems             Last Modified POA    * (Principal) Displacement of Henderson catheter, [facility] 1/1/2023 Yes    Urinary tract infection associated with indwelling urethral catheter ([facility] 1/1/2023 Yes    Retention of urine 1/1/2023 Yes    Gross hematuria 1/1/2023 Yes    YOAN (acute kidney injury) ([facility] 1/1/2023 Yes    Tobacco abuse 1/1/2023 Yes    Acute bronchitis 1/3/2023 Yes       Plan:        Displacement of henderson catheter s/p removal in OR and cystoscopy with replacement by Urology- on Flomax  UTI- culture negative, stop antibiotic  YOAN- improved, stop IVF  Upper resp infection/ acute bronchitis- will send Doxycycline 100 mg BID x 7 days  Tobacco abuse- encourage cessation    Okay to DC home, f/u with Urology and PCP in 1 week      Graciela Denise MD  1/3/2023  4:05 PM

## 2023-01-03 NOTE — PROGRESS NOTES
Writer reviewed discharge instructions with patient. He verbalized understanding, signature obtained.  Patient sent home with belongings, henderson cath standard bag and script for Doxycycline

## 2023-04-23 ENCOUNTER — HOSPITAL ENCOUNTER (EMERGENCY)
Age: 58
Discharge: HOME | End: 2023-04-23
Attending: STUDENT IN AN ORGANIZED HEALTH CARE EDUCATION/TRAINING PROGRAM
Payer: COMMERCIAL

## 2023-04-23 ENCOUNTER — ANESTHESIA EVENT (OUTPATIENT)
Dept: OPERATING ROOM | Age: 58
End: 2023-04-23
Payer: COMMERCIAL

## 2023-04-23 ENCOUNTER — ANESTHESIA (OUTPATIENT)
Dept: OPERATING ROOM | Age: 58
End: 2023-04-23
Payer: COMMERCIAL

## 2023-04-23 VITALS
TEMPERATURE: 97.3 F | WEIGHT: 150 LBS | HEART RATE: 85 BPM | HEIGHT: 64 IN | SYSTOLIC BLOOD PRESSURE: 127 MMHG | RESPIRATION RATE: 19 BRPM | BODY MASS INDEX: 25.61 KG/M2 | OXYGEN SATURATION: 98 % | DIASTOLIC BLOOD PRESSURE: 84 MMHG

## 2023-04-23 DIAGNOSIS — R33.8 ACUTE URINARY RETENTION: Primary | ICD-10-CM

## 2023-04-23 DIAGNOSIS — T83.021A DISPLACEMENT OF FOLEY CATHETER, INITIAL ENCOUNTER (HCC): ICD-10-CM

## 2023-04-23 DIAGNOSIS — R33.9 INABILITY TO URINATE: ICD-10-CM

## 2023-04-23 LAB
ABSOLUTE EOS #: 0.08 K/UL (ref 0–0.44)
ABSOLUTE IMMATURE GRANULOCYTE: 0.04 K/UL (ref 0–0.3)
ABSOLUTE LYMPH #: 0.72 K/UL (ref 1.1–3.7)
ABSOLUTE MONO #: 0.98 K/UL (ref 0.1–1.2)
ANION GAP SERPL CALCULATED.3IONS-SCNC: 12 MMOL/L (ref 9–17)
BASOPHILS # BLD: 0 % (ref 0–2)
BASOPHILS ABSOLUTE: 0.06 K/UL (ref 0–0.2)
BILIRUBIN URINE: NEGATIVE
BUN SERPL-MCNC: 23 MG/DL (ref 6–20)
BUN/CREAT BLD: 17 (ref 9–20)
CALCIUM SERPL-MCNC: 9.2 MG/DL (ref 8.6–10.4)
CHLORIDE SERPL-SCNC: 104 MMOL/L (ref 98–107)
CO2 SERPL-SCNC: 23 MMOL/L (ref 20–31)
COLOR: YELLOW
CREAT SERPL-MCNC: 1.36 MG/DL (ref 0.7–1.2)
EOSINOPHILS RELATIVE PERCENT: 1 % (ref 1–4)
EPITHELIAL CELLS UA: NORMAL /HPF (ref 0–5)
GFR SERPL CREATININE-BSD FRML MDRD: 60 ML/MIN/1.73M2
GLUCOSE SERPL-MCNC: 135 MG/DL (ref 70–99)
GLUCOSE UR STRIP.AUTO-MCNC: NEGATIVE MG/DL
HCT VFR BLD AUTO: 47.1 % (ref 40.7–50.3)
HGB BLD-MCNC: 14.9 G/DL (ref 13–17)
IMMATURE GRANULOCYTES: 0 %
KETONES UR STRIP.AUTO-MCNC: NEGATIVE MG/DL
LEUKOCYTE ESTERASE UR QL STRIP.AUTO: ABNORMAL
LYMPHOCYTES # BLD: 5 % (ref 24–43)
MCH RBC QN AUTO: 29 PG (ref 25.2–33.5)
MCHC RBC AUTO-ENTMCNC: 31.6 G/DL (ref 28.4–34.8)
MCV RBC AUTO: 91.8 FL (ref 82.6–102.9)
MONOCYTES # BLD: 7 % (ref 3–12)
NITRITE UR QL STRIP.AUTO: POSITIVE
NRBC AUTOMATED: 0 PER 100 WBC
PDW BLD-RTO: 13.6 % (ref 11.8–14.4)
PLATELET # BLD AUTO: 220 K/UL (ref 138–453)
PMV BLD AUTO: 9.4 FL (ref 8.1–13.5)
POTASSIUM SERPL-SCNC: 4.6 MMOL/L (ref 3.7–5.3)
PROT UR STRIP.AUTO-MCNC: 6.5 MG/DL (ref 5–8)
PROT UR STRIP.AUTO-MCNC: ABNORMAL MG/DL
RBC # BLD: 5.13 M/UL (ref 4.21–5.77)
RBC CLUMPS #/AREA URNS AUTO: NORMAL /HPF (ref 0–2)
SEG NEUTROPHILS: 87 % (ref 36–65)
SEGMENTED NEUTROPHILS ABSOLUTE COUNT: 11.58 K/UL (ref 1.5–8.1)
SODIUM SERPL-SCNC: 139 MMOL/L (ref 135–144)
SPECIFIC GRAVITY UA: 1.02 (ref 1–1.03)
TURBIDITY: ABNORMAL
URINE HGB: ABNORMAL
UROBILINOGEN, URINE: NORMAL
WBC # BLD AUTO: 13.5 K/UL (ref 3.5–11.3)
WBC UA: NORMAL /HPF (ref 0–5)

## 2023-04-23 PROCEDURE — 7100000010 HC PHASE II RECOVERY - FIRST 15 MIN: Performed by: UROLOGY

## 2023-04-23 PROCEDURE — 96375 TX/PRO/DX INJ NEW DRUG ADDON: CPT | Performed by: STUDENT IN AN ORGANIZED HEALTH CARE EDUCATION/TRAINING PROGRAM

## 2023-04-23 PROCEDURE — C1769 GUIDE WIRE: HCPCS | Performed by: UROLOGY

## 2023-04-23 PROCEDURE — 87086 URINE CULTURE/COLONY COUNT: CPT

## 2023-04-23 PROCEDURE — 96365 THER/PROPH/DIAG IV INF INIT: CPT | Performed by: STUDENT IN AN ORGANIZED HEALTH CARE EDUCATION/TRAINING PROGRAM

## 2023-04-23 PROCEDURE — 96376 TX/PRO/DX INJ SAME DRUG ADON: CPT | Performed by: STUDENT IN AN ORGANIZED HEALTH CARE EDUCATION/TRAINING PROGRAM

## 2023-04-23 PROCEDURE — 3700000001 HC ADD 15 MINUTES (ANESTHESIA): Performed by: UROLOGY

## 2023-04-23 PROCEDURE — 6360000002 HC RX W HCPCS: Performed by: STUDENT IN AN ORGANIZED HEALTH CARE EDUCATION/TRAINING PROGRAM

## 2023-04-23 PROCEDURE — 2709999900 HC NON-CHARGEABLE SUPPLY: Performed by: UROLOGY

## 2023-04-23 PROCEDURE — 80048 BASIC METABOLIC PNL TOTAL CA: CPT

## 2023-04-23 PROCEDURE — 99285 EMERGENCY DEPT VISIT HI MDM: CPT | Performed by: STUDENT IN AN ORGANIZED HEALTH CARE EDUCATION/TRAINING PROGRAM

## 2023-04-23 PROCEDURE — 6370000000 HC RX 637 (ALT 250 FOR IP): Performed by: UROLOGY

## 2023-04-23 PROCEDURE — 7100000011 HC PHASE II RECOVERY - ADDTL 15 MIN: Performed by: UROLOGY

## 2023-04-23 PROCEDURE — 2500000003 HC RX 250 WO HCPCS: Performed by: STUDENT IN AN ORGANIZED HEALTH CARE EDUCATION/TRAINING PROGRAM

## 2023-04-23 PROCEDURE — 3600000012 HC SURGERY LEVEL 2 ADDTL 15MIN: Performed by: UROLOGY

## 2023-04-23 PROCEDURE — 2580000003 HC RX 258: Performed by: STUDENT IN AN ORGANIZED HEALTH CARE EDUCATION/TRAINING PROGRAM

## 2023-04-23 PROCEDURE — 87186 SC STD MICRODIL/AGAR DIL: CPT

## 2023-04-23 PROCEDURE — 6370000000 HC RX 637 (ALT 250 FOR IP): Performed by: STUDENT IN AN ORGANIZED HEALTH CARE EDUCATION/TRAINING PROGRAM

## 2023-04-23 PROCEDURE — 3600000002 HC SURGERY LEVEL 2 BASE: Performed by: UROLOGY

## 2023-04-23 PROCEDURE — 86403 PARTICLE AGGLUT ANTBDY SCRN: CPT

## 2023-04-23 PROCEDURE — 85025 COMPLETE CBC W/AUTO DIFF WBC: CPT

## 2023-04-23 PROCEDURE — 3700000000 HC ANESTHESIA ATTENDED CARE: Performed by: UROLOGY

## 2023-04-23 PROCEDURE — 81001 URINALYSIS AUTO W/SCOPE: CPT

## 2023-04-23 RX ORDER — SILDENAFIL 50 MG/1
50 TABLET, FILM COATED ORAL
COMMUNITY
Start: 2023-04-06

## 2023-04-23 RX ORDER — OXYCODONE HYDROCHLORIDE 5 MG/1
10 TABLET ORAL PRN
Status: DISCONTINUED | OUTPATIENT
Start: 2023-04-23 | End: 2023-04-23 | Stop reason: HOSPADM

## 2023-04-23 RX ORDER — ONDANSETRON 2 MG/ML
4 INJECTION INTRAMUSCULAR; INTRAVENOUS ONCE
Status: COMPLETED | OUTPATIENT
Start: 2023-04-23 | End: 2023-04-23

## 2023-04-23 RX ORDER — SODIUM CHLORIDE 0.9 % (FLUSH) 0.9 %
5-40 SYRINGE (ML) INJECTION EVERY 12 HOURS SCHEDULED
Status: DISCONTINUED | OUTPATIENT
Start: 2023-04-23 | End: 2023-04-23 | Stop reason: HOSPADM

## 2023-04-23 RX ORDER — FENTANYL CITRATE 50 UG/ML
INJECTION, SOLUTION INTRAMUSCULAR; INTRAVENOUS PRN
Status: DISCONTINUED | OUTPATIENT
Start: 2023-04-23 | End: 2023-04-23 | Stop reason: SDUPTHER

## 2023-04-23 RX ORDER — PROCHLORPERAZINE EDISYLATE 5 MG/ML
5 INJECTION INTRAMUSCULAR; INTRAVENOUS
Status: DISCONTINUED | OUTPATIENT
Start: 2023-04-23 | End: 2023-04-23 | Stop reason: HOSPADM

## 2023-04-23 RX ORDER — SODIUM CHLORIDE 9 MG/ML
INJECTION, SOLUTION INTRAVENOUS CONTINUOUS PRN
Status: DISCONTINUED | OUTPATIENT
Start: 2023-04-23 | End: 2023-04-23 | Stop reason: SDUPTHER

## 2023-04-23 RX ORDER — CEPHALEXIN 500 MG/1
500 CAPSULE ORAL 3 TIMES DAILY
Qty: 21 CAPSULE | Refills: 0 | Status: SHIPPED | OUTPATIENT
Start: 2023-04-23 | End: 2023-04-30

## 2023-04-23 RX ORDER — LABETALOL HYDROCHLORIDE 5 MG/ML
10 INJECTION, SOLUTION INTRAVENOUS
Status: DISCONTINUED | OUTPATIENT
Start: 2023-04-23 | End: 2023-04-23 | Stop reason: HOSPADM

## 2023-04-23 RX ORDER — LIDOCAINE HYDROCHLORIDE 20 MG/ML
5 JELLY TOPICAL ONCE
Status: COMPLETED | OUTPATIENT
Start: 2023-04-23 | End: 2023-04-23

## 2023-04-23 RX ORDER — LIDOCAINE HYDROCHLORIDE 20 MG/ML
INJECTION, SOLUTION EPIDURAL; INFILTRATION; INTRACAUDAL; PERINEURAL PRN
Status: DISCONTINUED | OUTPATIENT
Start: 2023-04-23 | End: 2023-04-23 | Stop reason: SDUPTHER

## 2023-04-23 RX ORDER — OXYCODONE HYDROCHLORIDE 5 MG/1
5 TABLET ORAL PRN
Status: DISCONTINUED | OUTPATIENT
Start: 2023-04-23 | End: 2023-04-23 | Stop reason: HOSPADM

## 2023-04-23 RX ORDER — MORPHINE SULFATE 4 MG/ML
4 INJECTION, SOLUTION INTRAMUSCULAR; INTRAVENOUS ONCE
Status: COMPLETED | OUTPATIENT
Start: 2023-04-23 | End: 2023-04-23

## 2023-04-23 RX ORDER — LIDOCAINE HYDROCHLORIDE 20 MG/ML
JELLY TOPICAL PRN
Status: DISCONTINUED | OUTPATIENT
Start: 2023-04-23 | End: 2023-04-23 | Stop reason: ALTCHOICE

## 2023-04-23 RX ORDER — SODIUM CHLORIDE 0.9 % (FLUSH) 0.9 %
5-40 SYRINGE (ML) INJECTION PRN
Status: DISCONTINUED | OUTPATIENT
Start: 2023-04-23 | End: 2023-04-23 | Stop reason: HOSPADM

## 2023-04-23 RX ORDER — FENTANYL CITRATE 50 UG/ML
25 INJECTION, SOLUTION INTRAMUSCULAR; INTRAVENOUS EVERY 5 MIN PRN
Status: DISCONTINUED | OUTPATIENT
Start: 2023-04-23 | End: 2023-04-23 | Stop reason: HOSPADM

## 2023-04-23 RX ORDER — PROPOFOL 10 MG/ML
INJECTION, EMULSION INTRAVENOUS PRN
Status: DISCONTINUED | OUTPATIENT
Start: 2023-04-23 | End: 2023-04-23 | Stop reason: SDUPTHER

## 2023-04-23 RX ORDER — ONDANSETRON 2 MG/ML
4 INJECTION INTRAMUSCULAR; INTRAVENOUS
Status: DISCONTINUED | OUTPATIENT
Start: 2023-04-23 | End: 2023-04-23 | Stop reason: HOSPADM

## 2023-04-23 RX ORDER — HYDROMORPHONE HYDROCHLORIDE 1 MG/ML
0.5 INJECTION, SOLUTION INTRAMUSCULAR; INTRAVENOUS; SUBCUTANEOUS EVERY 5 MIN PRN
Status: DISCONTINUED | OUTPATIENT
Start: 2023-04-23 | End: 2023-04-23 | Stop reason: HOSPADM

## 2023-04-23 RX ORDER — SODIUM CHLORIDE 9 MG/ML
INJECTION, SOLUTION INTRAVENOUS PRN
Status: DISCONTINUED | OUTPATIENT
Start: 2023-04-23 | End: 2023-04-23 | Stop reason: HOSPADM

## 2023-04-23 RX ADMIN — LIDOCAINE HYDROCHLORIDE 60 MG: 20 INJECTION, SOLUTION EPIDURAL; INFILTRATION; INTRACAUDAL; PERINEURAL at 06:18

## 2023-04-23 RX ADMIN — MORPHINE SULFATE 4 MG: 4 INJECTION, SOLUTION INTRAMUSCULAR; INTRAVENOUS at 03:01

## 2023-04-23 RX ADMIN — PROPOFOL 25 MG: 10 INJECTION, EMULSION INTRAVENOUS at 06:21

## 2023-04-23 RX ADMIN — PROPOFOL 25 MG: 10 INJECTION, EMULSION INTRAVENOUS at 06:24

## 2023-04-23 RX ADMIN — PROPOFOL 50 MG: 10 INJECTION, EMULSION INTRAVENOUS at 06:18

## 2023-04-23 RX ADMIN — PROPOFOL 25 MG: 10 INJECTION, EMULSION INTRAVENOUS at 06:27

## 2023-04-23 RX ADMIN — PROPOFOL 25 MG: 10 INJECTION, EMULSION INTRAVENOUS at 06:25

## 2023-04-23 RX ADMIN — FENTANYL CITRATE 50 MCG: 50 INJECTION INTRAMUSCULAR; INTRAVENOUS at 06:14

## 2023-04-23 RX ADMIN — LIDOCAINE HYDROCHLORIDE 5 ML: 20 JELLY TOPICAL at 03:01

## 2023-04-23 RX ADMIN — SODIUM CHLORIDE: 9 INJECTION, SOLUTION INTRAVENOUS at 06:10

## 2023-04-23 RX ADMIN — CEFTRIAXONE SODIUM 2000 MG: 2 INJECTION, POWDER, FOR SOLUTION INTRAMUSCULAR; INTRAVENOUS at 04:01

## 2023-04-23 RX ADMIN — PROPOFOL 25 MG: 10 INJECTION, EMULSION INTRAVENOUS at 06:03

## 2023-04-23 RX ADMIN — ONDANSETRON 4 MG: 2 INJECTION INTRAMUSCULAR; INTRAVENOUS at 03:01

## 2023-04-23 RX ADMIN — PROPOFOL 25 MG: 10 INJECTION, EMULSION INTRAVENOUS at 06:30

## 2023-04-23 RX ADMIN — MORPHINE SULFATE 4 MG: 4 INJECTION, SOLUTION INTRAMUSCULAR; INTRAVENOUS at 04:12

## 2023-04-23 ASSESSMENT — PAIN SCALES - GENERAL
PAINLEVEL_OUTOF10: 10

## 2023-04-23 ASSESSMENT — PAIN - FUNCTIONAL ASSESSMENT: PAIN_FUNCTIONAL_ASSESSMENT: 0-10

## 2023-04-24 LAB
MICROORGANISM SPEC CULT: ABNORMAL
SPECIMEN DESCRIPTION: ABNORMAL

## 2023-04-24 NOTE — OP NOTE
[id number] [facility] [address]                [address] [place] [address]                                OPERATIVE REPORT    PATIENT NAME: Eliel Lomas                         :        1965  MED REC NO:   [id number]                             ROOM:  ACCOUNT NO:   [de-identified]                           ADMIT DATE: 2023  PROVIDER:     Anna Cabrera    DATE OF PROCEDURE:  2023    PREOPERATIVE DIAGNOSES:  Chronic urinary retention, difficult Garcia  catheter placement. POSTOPERATIVE DIAGNOSES:  Chronic urinary retention, difficult Garcia  catheter placement. PROCEDURES PERFORMED:  Cystoscopy and difficult Garcia catheter  placement. SURGEON:  Anna Cabrera MD    ANESTHESIA:  MAC.    COMPLICATIONS:  None. BLEEDING:  None. DRAINS:  16-Yi Garcia catheter. SPECIMEN:  Urine for culture and sensitivity. HISTORY OF PRESENT ILLNESS:  The patient is a 67-year-old male who  reports that he had a Garcia catheter placed in the beginning of 2023  and required cystoscopy at that time as well. He states that he has not  had his catheter changed since that time. He does report that he was  supposed to see another urologist and that he has seen that urologist,  he is a bit of a poor historian. He comes in to the ER as he reports  that his Garcia catheter has fallen out. He has a history of a false  passage. Attempts to place the Garcia catheter in the emergency room  were unsuccessful and as a result, we are here for cysto and Garcia  catheter placement. OPERATIVE PROCEDURE:  The patient was brought back to the operating room  and laid on the operating table in supine position. Once MAC anesthesia  was obtained, he was prepped and draped in the usual sterile fashion. A  time-out was performed. He was properly identified. Antibiotics had  been administered. 10 mL of 2% lidocaine gel was injected in a way to  achieve local anesthesia.   A flexible

## 2023-04-25 NOTE — PROGRESS NOTES
Culture Callback Review:   -Patient has positive urine culture for MRSA. Given ceftriaxone 2 g IV x1 on 4/23/23. Discharged from ER on antibiotic therapy with Keflex 500 mg PO TID x7 days . Patient underwent cystoscopy and henderson placement by urologist (Dr. Celi Brush), who prescribed Keflex at discharge. -Recommendation: Change to doxycycline 100 mg PO BID x7 days.  -Reached out to Dr. Sarah Dotson (urologist), who agreed with antibiotic recommendation. Requested that I follow-up with patient and Rx.   -Prescription for doxycycline called in to patient's [facility] (phone #: [phone]). Discussed with [address] that doxycycline is replacing Keflex based on urine culture results.   -Discussed with patient, who also expressed concern for new hematuria. Per Dr. Sarah Dotson, instructed patient to increase oral fluid consumption and call office tomorrow morning ([phone]). Patient is agreeable to plan.        Tameka Altamirano, PharmD, BCPS

## 2023-05-17 ENCOUNTER — ANESTHESIA EVENT (OUTPATIENT)
Dept: OPERATING ROOM | Age: 58
End: 2023-05-17
Payer: COMMERCIAL

## 2023-05-17 ENCOUNTER — ANESTHESIA (OUTPATIENT)
Dept: OPERATING ROOM | Age: 58
End: 2023-05-17
Payer: COMMERCIAL

## 2023-05-17 ENCOUNTER — APPOINTMENT (OUTPATIENT)
Dept: CT IMAGING | Age: 58
DRG: 720 | End: 2023-05-17
Payer: COMMERCIAL

## 2023-05-17 ENCOUNTER — HOSPITAL ENCOUNTER (INPATIENT)
Age: 58
LOS: 5 days | Discharge: HOME | DRG: 720 | End: 2023-05-22
Attending: EMERGENCY MEDICINE | Admitting: FAMILY MEDICINE
Payer: COMMERCIAL

## 2023-05-17 DIAGNOSIS — N17.9 AKI (ACUTE KIDNEY INJURY) (HCC): ICD-10-CM

## 2023-05-17 DIAGNOSIS — R33.9 URINARY RETENTION: Primary | ICD-10-CM

## 2023-05-17 DIAGNOSIS — A41.9 SEPTIC SHOCK (HCC): ICD-10-CM

## 2023-05-17 DIAGNOSIS — R65.21 SEPTIC SHOCK (HCC): ICD-10-CM

## 2023-05-17 PROBLEM — R65.20 SEPSIS WITH ACUTE RENAL FAILURE WITHOUT SEPTIC SHOCK (HCC): Status: ACTIVE | Noted: 2023-05-17

## 2023-05-17 PROBLEM — R00.0 TACHYCARDIA: Status: ACTIVE | Noted: 2023-05-17

## 2023-05-17 PROBLEM — D72.829 LEUKOCYTOSIS: Status: ACTIVE | Noted: 2023-05-17

## 2023-05-17 PROBLEM — N18.2 STAGE 2 CHRONIC KIDNEY DISEASE: Status: ACTIVE | Noted: 2023-05-17

## 2023-05-17 PROBLEM — R33.8 ACUTE URINARY RETENTION: Status: ACTIVE | Noted: 2023-05-17

## 2023-05-17 LAB
ANION GAP SERPL CALCULATED.3IONS-SCNC: 17 MMOL/L (ref 9–17)
ANION GAP SERPL CALCULATED.3IONS-SCNC: 18 MMOL/L (ref 9–17)
BACTERIA URNS QL MICRO: ABNORMAL
BILIRUB UR QL STRIP: NEGATIVE
BUN SERPL-MCNC: 78 MG/DL (ref 6–20)
BUN SERPL-MCNC: 89 MG/DL (ref 6–20)
BUN/CREAT SERPL: 12 (ref 9–20)
BUN/CREAT SERPL: 12 (ref 9–20)
CALCIUM SERPL-MCNC: 7.7 MG/DL (ref 8.6–10.4)
CALCIUM SERPL-MCNC: 8.9 MG/DL (ref 8.6–10.4)
CHLORIDE SERPL-SCNC: 101 MMOL/L (ref 98–107)
CHLORIDE SERPL-SCNC: 94 MMOL/L (ref 98–107)
CLARITY UR: ABNORMAL
CO2 SERPL-SCNC: 17 MMOL/L (ref 20–31)
CO2 SERPL-SCNC: 20 MMOL/L (ref 20–31)
COLOR UR: YELLOW
CREAT SERPL-MCNC: 6.47 MG/DL (ref 0.7–1.2)
CREAT SERPL-MCNC: 7.34 MG/DL (ref 0.7–1.2)
EPI CELLS #/AREA URNS HPF: ABNORMAL /HPF (ref 0–5)
ERYTHROCYTE [DISTWIDTH] IN BLOOD BY AUTOMATED COUNT: 14.5 % (ref 11.8–14.4)
GFR SERPL CREATININE-BSD FRML MDRD: 8 ML/MIN/1.73M2
GFR SERPL CREATININE-BSD FRML MDRD: 9 ML/MIN/1.73M2
GLUCOSE BLD-MCNC: 65 MG/DL (ref 75–110)
GLUCOSE BLD-MCNC: 76 MG/DL (ref 75–110)
GLUCOSE SERPL-MCNC: 130 MG/DL (ref 70–99)
GLUCOSE SERPL-MCNC: 79 MG/DL (ref 70–99)
GLUCOSE UR STRIP.AUTO-MCNC: NEGATIVE MG/DL
HCT VFR BLD AUTO: 43.1 % (ref 40.7–50.3)
HGB BLD-MCNC: 14.3 G/DL (ref 13–17)
HGB UR QL STRIP.AUTO: ABNORMAL
INR PPP: 1.3
KETONES UR STRIP.AUTO-MCNC: NEGATIVE MG/DL
LACTATE BLDV-SCNC: 2.2 MMOL/L (ref 0.5–1.9)
LACTATE BLDV-SCNC: 3.5 MMOL/L (ref 0.5–1.9)
LEUKOCYTE ESTERASE UR QL STRIP: ABNORMAL
MAGNESIUM SERPL-MCNC: 2 MG/DL (ref 1.6–2.6)
MCH RBC QN AUTO: 29.7 PG (ref 25.2–33.5)
MCHC RBC AUTO-ENTMCNC: 33.2 G/DL (ref 28.4–34.8)
MCV RBC AUTO: 89.4 FL (ref 82.6–102.9)
NITRITE UR QL STRIP: NEGATIVE
NRBC AUTOMATED: 0 PER 100 WBC
PARTIAL THROMBOPLASTIN TIME: 31.1 SEC (ref 23.9–33.8)
PLATELET # BLD AUTO: 147 K/UL (ref 138–453)
PMV BLD AUTO: 9 FL (ref 8.1–13.5)
POTASSIUM SERPL-SCNC: 3.7 MMOL/L (ref 3.7–5.3)
POTASSIUM SERPL-SCNC: 4.7 MMOL/L (ref 3.7–5.3)
PROT UR STRIP-MCNC: ABNORMAL MG/DL
PROT UR STRIP.AUTO-MCNC: 6 MG/DL (ref 5–8)
PROTHROMBIN TIME: 16.3 SEC (ref 11.5–14.2)
RBC # BLD AUTO: 4.82 M/UL (ref 4.21–5.77)
RBC #/AREA URNS HPF: ABNORMAL /HPF (ref 0–2)
SODIUM SERPL-SCNC: 132 MMOL/L (ref 135–144)
SODIUM SERPL-SCNC: 135 MMOL/L (ref 135–144)
SP GR UR STRIP.AUTO: 1.02 (ref 1–1.03)
TSH SERPL-ACNC: 1.5 UIU/ML (ref 0.3–5)
UROBILINOGEN UR STRIP-ACNC: NORMAL
WBC #/AREA URNS HPF: ABNORMAL /HPF (ref 0–5)
WBC OTHER # BLD: 20.5 K/UL (ref 3.5–11.3)

## 2023-05-17 PROCEDURE — 6360000002 HC RX W HCPCS: Performed by: NURSE PRACTITIONER

## 2023-05-17 PROCEDURE — 2580000003 HC RX 258: Performed by: EMERGENCY MEDICINE

## 2023-05-17 PROCEDURE — 6360000002 HC RX W HCPCS: Performed by: EMERGENCY MEDICINE

## 2023-05-17 PROCEDURE — 94761 N-INVAS EAR/PLS OXIMETRY MLT: CPT

## 2023-05-17 PROCEDURE — 93005 ELECTROCARDIOGRAM TRACING: CPT | Performed by: EMERGENCY MEDICINE

## 2023-05-17 PROCEDURE — 85610 PROTHROMBIN TIME: CPT

## 2023-05-17 PROCEDURE — 87186 SC STD MICRODIL/AGAR DIL: CPT

## 2023-05-17 PROCEDURE — 83605 ASSAY OF LACTIC ACID: CPT

## 2023-05-17 PROCEDURE — 93005 ELECTROCARDIOGRAM TRACING: CPT | Performed by: NURSE PRACTITIONER

## 2023-05-17 PROCEDURE — 74176 CT ABD & PELVIS W/O CONTRAST: CPT

## 2023-05-17 PROCEDURE — 2000000000 HC ICU R&B

## 2023-05-17 PROCEDURE — 99285 EMERGENCY DEPT VISIT HI MDM: CPT

## 2023-05-17 PROCEDURE — 6360000002 HC RX W HCPCS: Performed by: ANESTHESIOLOGY

## 2023-05-17 PROCEDURE — 82947 ASSAY GLUCOSE BLOOD QUANT: CPT

## 2023-05-17 PROCEDURE — 2700000000 HC OXYGEN THERAPY PER DAY

## 2023-05-17 PROCEDURE — 6370000000 HC RX 637 (ALT 250 FOR IP): Performed by: EMERGENCY MEDICINE

## 2023-05-17 PROCEDURE — 36415 COLL VENOUS BLD VENIPUNCTURE: CPT

## 2023-05-17 PROCEDURE — 2580000003 HC RX 258: Performed by: NURSE PRACTITIONER

## 2023-05-17 PROCEDURE — 2580000003 HC RX 258

## 2023-05-17 PROCEDURE — 6370000000 HC RX 637 (ALT 250 FOR IP): Performed by: NURSE PRACTITIONER

## 2023-05-17 PROCEDURE — 83735 ASSAY OF MAGNESIUM: CPT

## 2023-05-17 PROCEDURE — 96365 THER/PROPH/DIAG IV INF INIT: CPT

## 2023-05-17 PROCEDURE — 87040 BLOOD CULTURE FOR BACTERIA: CPT

## 2023-05-17 PROCEDURE — 87086 URINE CULTURE/COLONY COUNT: CPT

## 2023-05-17 PROCEDURE — 96375 TX/PRO/DX INJ NEW DRUG ADDON: CPT

## 2023-05-17 PROCEDURE — 85027 COMPLETE CBC AUTOMATED: CPT

## 2023-05-17 PROCEDURE — 87088 URINE BACTERIA CULTURE: CPT

## 2023-05-17 PROCEDURE — 85730 THROMBOPLASTIN TIME PARTIAL: CPT

## 2023-05-17 PROCEDURE — 80048 BASIC METABOLIC PNL TOTAL CA: CPT

## 2023-05-17 PROCEDURE — 81001 URINALYSIS AUTO W/SCOPE: CPT

## 2023-05-17 PROCEDURE — 2500000003 HC RX 250 WO HCPCS

## 2023-05-17 PROCEDURE — 84443 ASSAY THYROID STIM HORMONE: CPT

## 2023-05-17 PROCEDURE — 99222 1ST HOSP IP/OBS MODERATE 55: CPT | Performed by: NURSE PRACTITIONER

## 2023-05-17 RX ORDER — HEPARIN SODIUM 5000 [USP'U]/ML
5000 INJECTION, SOLUTION INTRAVENOUS; SUBCUTANEOUS 2 TIMES DAILY
Status: DISCONTINUED | OUTPATIENT
Start: 2023-05-18 | End: 2023-05-22 | Stop reason: HOSPADM

## 2023-05-17 RX ORDER — PANTOPRAZOLE SODIUM 40 MG/1
40 TABLET, DELAYED RELEASE ORAL
Status: DISCONTINUED | OUTPATIENT
Start: 2023-05-18 | End: 2023-05-22 | Stop reason: HOSPADM

## 2023-05-17 RX ORDER — MORPHINE SULFATE 4 MG/ML
4 INJECTION, SOLUTION INTRAMUSCULAR; INTRAVENOUS
Status: DISCONTINUED | OUTPATIENT
Start: 2023-05-17 | End: 2023-05-17

## 2023-05-17 RX ORDER — MORPHINE SULFATE 2 MG/ML
2 INJECTION, SOLUTION INTRAMUSCULAR; INTRAVENOUS
Status: DISCONTINUED | OUTPATIENT
Start: 2023-05-17 | End: 2023-05-17

## 2023-05-17 RX ORDER — MORPHINE SULFATE 2 MG/ML
2 INJECTION, SOLUTION INTRAMUSCULAR; INTRAVENOUS EVERY 4 HOURS PRN
Status: DISCONTINUED | OUTPATIENT
Start: 2023-05-17 | End: 2023-05-22 | Stop reason: HOSPADM

## 2023-05-17 RX ORDER — ENOXAPARIN SODIUM 100 MG/ML
40 INJECTION SUBCUTANEOUS DAILY
Status: DISCONTINUED | OUTPATIENT
Start: 2023-05-18 | End: 2023-05-17 | Stop reason: CLARIF

## 2023-05-17 RX ORDER — 0.9 % SODIUM CHLORIDE 0.9 %
1000 INTRAVENOUS SOLUTION INTRAVENOUS ONCE
Status: COMPLETED | OUTPATIENT
Start: 2023-05-17 | End: 2023-05-17

## 2023-05-17 RX ORDER — MIDODRINE HYDROCHLORIDE 5 MG/1
5 TABLET ORAL ONCE
Status: COMPLETED | OUTPATIENT
Start: 2023-05-17 | End: 2023-05-17

## 2023-05-17 RX ORDER — ONDANSETRON 2 MG/ML
4 INJECTION INTRAMUSCULAR; INTRAVENOUS EVERY 6 HOURS PRN
Status: DISCONTINUED | OUTPATIENT
Start: 2023-05-17 | End: 2023-05-22 | Stop reason: HOSPADM

## 2023-05-17 RX ORDER — FENTANYL CITRATE 50 UG/ML
INJECTION, SOLUTION INTRAMUSCULAR; INTRAVENOUS PRN
Status: DISCONTINUED | OUTPATIENT
Start: 2023-05-17 | End: 2023-05-18 | Stop reason: HOSPADM

## 2023-05-17 RX ORDER — SODIUM CHLORIDE 9 MG/ML
INJECTION, SOLUTION INTRAVENOUS PRN
Status: DISCONTINUED | OUTPATIENT
Start: 2023-05-17 | End: 2023-05-22 | Stop reason: HOSPADM

## 2023-05-17 RX ORDER — ACETAMINOPHEN 325 MG/1
650 TABLET ORAL EVERY 6 HOURS PRN
Status: DISCONTINUED | OUTPATIENT
Start: 2023-05-17 | End: 2023-05-22 | Stop reason: HOSPADM

## 2023-05-17 RX ORDER — NOREPINEPHRINE BIT/0.9 % NACL 16MG/250ML
INFUSION BOTTLE (ML) INTRAVENOUS
Status: COMPLETED
Start: 2023-05-17 | End: 2023-05-17

## 2023-05-17 RX ORDER — NOREPINEPHRINE BIT/0.9 % NACL 16MG/250ML
1-100 INFUSION BOTTLE (ML) INTRAVENOUS CONTINUOUS
Status: DISCONTINUED | OUTPATIENT
Start: 2023-05-17 | End: 2023-05-22 | Stop reason: HOSPADM

## 2023-05-17 RX ORDER — POTASSIUM CHLORIDE 7.45 MG/ML
10 INJECTION INTRAVENOUS PRN
Status: DISCONTINUED | OUTPATIENT
Start: 2023-05-17 | End: 2023-05-17 | Stop reason: ALTCHOICE

## 2023-05-17 RX ORDER — SODIUM CHLORIDE 0.9 % (FLUSH) 0.9 %
5-40 SYRINGE (ML) INJECTION EVERY 12 HOURS SCHEDULED
Status: DISCONTINUED | OUTPATIENT
Start: 2023-05-17 | End: 2023-05-22 | Stop reason: HOSPADM

## 2023-05-17 RX ORDER — MAGNESIUM SULFATE 1 G/100ML
1000 INJECTION INTRAVENOUS PRN
Status: DISCONTINUED | OUTPATIENT
Start: 2023-05-17 | End: 2023-05-17 | Stop reason: ALTCHOICE

## 2023-05-17 RX ORDER — POTASSIUM CHLORIDE 20 MEQ/1
40 TABLET, EXTENDED RELEASE ORAL PRN
Status: DISCONTINUED | OUTPATIENT
Start: 2023-05-17 | End: 2023-05-17 | Stop reason: ALTCHOICE

## 2023-05-17 RX ORDER — LIDOCAINE HYDROCHLORIDE 20 MG/ML
5 JELLY TOPICAL PRN
Status: DISCONTINUED | OUTPATIENT
Start: 2023-05-17 | End: 2023-05-22 | Stop reason: HOSPADM

## 2023-05-17 RX ORDER — SODIUM CHLORIDE 0.9 % (FLUSH) 0.9 %
10 SYRINGE (ML) INJECTION PRN
Status: DISCONTINUED | OUTPATIENT
Start: 2023-05-17 | End: 2023-05-22 | Stop reason: HOSPADM

## 2023-05-17 RX ORDER — MORPHINE SULFATE 4 MG/ML
4 INJECTION, SOLUTION INTRAMUSCULAR; INTRAVENOUS ONCE
Status: COMPLETED | OUTPATIENT
Start: 2023-05-17 | End: 2023-05-17

## 2023-05-17 RX ORDER — LINEZOLID 2 MG/ML
600 INJECTION, SOLUTION INTRAVENOUS EVERY 12 HOURS
Status: DISCONTINUED | OUTPATIENT
Start: 2023-05-17 | End: 2023-05-19

## 2023-05-17 RX ORDER — TAMSULOSIN HYDROCHLORIDE 0.4 MG/1
0.4 CAPSULE ORAL DAILY
Status: DISCONTINUED | OUTPATIENT
Start: 2023-05-17 | End: 2023-05-22 | Stop reason: HOSPADM

## 2023-05-17 RX ORDER — GABAPENTIN 300 MG/1
300 CAPSULE ORAL 3 TIMES DAILY
Status: DISCONTINUED | OUTPATIENT
Start: 2023-05-17 | End: 2023-05-22 | Stop reason: HOSPADM

## 2023-05-17 RX ORDER — ACETAMINOPHEN 650 MG/1
650 SUPPOSITORY RECTAL EVERY 6 HOURS PRN
Status: DISCONTINUED | OUTPATIENT
Start: 2023-05-17 | End: 2023-05-22 | Stop reason: HOSPADM

## 2023-05-17 RX ORDER — MORPHINE SULFATE 4 MG/ML
4 INJECTION, SOLUTION INTRAMUSCULAR; INTRAVENOUS EVERY 4 HOURS PRN
Status: DISCONTINUED | OUTPATIENT
Start: 2023-05-17 | End: 2023-05-22 | Stop reason: HOSPADM

## 2023-05-17 RX ORDER — 0.9 % SODIUM CHLORIDE 0.9 %
500 INTRAVENOUS SOLUTION INTRAVENOUS ONCE
Status: COMPLETED | OUTPATIENT
Start: 2023-05-17 | End: 2023-05-17

## 2023-05-17 RX ORDER — DEXTROSE MONOHYDRATE 100 MG/ML
INJECTION, SOLUTION INTRAVENOUS CONTINUOUS PRN
Status: DISCONTINUED | OUTPATIENT
Start: 2023-05-17 | End: 2023-05-22 | Stop reason: HOSPADM

## 2023-05-17 RX ORDER — SODIUM CHLORIDE 9 MG/ML
INJECTION, SOLUTION INTRAVENOUS CONTINUOUS
Status: DISCONTINUED | OUTPATIENT
Start: 2023-05-17 | End: 2023-05-18

## 2023-05-17 RX ORDER — HEPARIN SODIUM 5000 [USP'U]/ML
5000 INJECTION, SOLUTION INTRAVENOUS; SUBCUTANEOUS EVERY 8 HOURS SCHEDULED
Status: DISCONTINUED | OUTPATIENT
Start: 2023-05-17 | End: 2023-05-17

## 2023-05-17 RX ORDER — POLYETHYLENE GLYCOL 3350 17 G/17G
17 POWDER, FOR SOLUTION ORAL DAILY PRN
Status: DISCONTINUED | OUTPATIENT
Start: 2023-05-17 | End: 2023-05-22 | Stop reason: HOSPADM

## 2023-05-17 RX ORDER — DEXTROSE MONOHYDRATE 100 MG/ML
INJECTION, SOLUTION INTRAVENOUS
Status: COMPLETED
Start: 2023-05-17 | End: 2023-05-17

## 2023-05-17 RX ORDER — NICOTINE 21 MG/24HR
1 PATCH, TRANSDERMAL 24 HOURS TRANSDERMAL DAILY
Status: DISCONTINUED | OUTPATIENT
Start: 2023-05-17 | End: 2023-05-22 | Stop reason: HOSPADM

## 2023-05-17 RX ORDER — ONDANSETRON 4 MG/1
4 TABLET, ORALLY DISINTEGRATING ORAL EVERY 8 HOURS PRN
Status: DISCONTINUED | OUTPATIENT
Start: 2023-05-17 | End: 2023-05-22 | Stop reason: HOSPADM

## 2023-05-17 RX ADMIN — DEXTROSE MONOHYDRATE 125 ML: 100 INJECTION, SOLUTION INTRAVENOUS at 22:41

## 2023-05-17 RX ADMIN — SODIUM CHLORIDE 1000 ML: 9 INJECTION, SOLUTION INTRAVENOUS at 22:38

## 2023-05-17 RX ADMIN — SODIUM CHLORIDE 500 ML: 9 INJECTION, SOLUTION INTRAVENOUS at 12:40

## 2023-05-17 RX ADMIN — SODIUM CHLORIDE: 9 INJECTION, SOLUTION INTRAVENOUS at 17:59

## 2023-05-17 RX ADMIN — CEFTRIAXONE SODIUM 1000 MG: 1 INJECTION, POWDER, FOR SOLUTION INTRAMUSCULAR; INTRAVENOUS at 11:21

## 2023-05-17 RX ADMIN — FENTANYL CITRATE 50 MCG: 50 INJECTION INTRAMUSCULAR; INTRAVENOUS at 16:37

## 2023-05-17 RX ADMIN — ACETAMINOPHEN 650 MG: 650 SUPPOSITORY RECTAL at 18:07

## 2023-05-17 RX ADMIN — Medication 10 MCG/MIN: at 22:16

## 2023-05-17 RX ADMIN — MIDODRINE HYDROCHLORIDE 5 MG: 5 TABLET ORAL at 19:46

## 2023-05-17 RX ADMIN — SODIUM CHLORIDE, PRESERVATIVE FREE 10 ML: 5 INJECTION INTRAVENOUS at 21:09

## 2023-05-17 RX ADMIN — HEPARIN SODIUM 5000 UNITS: 5000 INJECTION INTRAVENOUS; SUBCUTANEOUS at 18:24

## 2023-05-17 RX ADMIN — LIDOCAINE HYDROCHLORIDE 5 ML: 20 JELLY TOPICAL at 09:08

## 2023-05-17 RX ADMIN — LINEZOLID 600 MG: 600 INJECTION, SOLUTION INTRAVENOUS at 22:33

## 2023-05-17 RX ADMIN — MORPHINE SULFATE 4 MG: 4 INJECTION, SOLUTION INTRAMUSCULAR; INTRAVENOUS at 11:54

## 2023-05-17 RX ADMIN — TAMSULOSIN HYDROCHLORIDE 0.4 MG: 0.4 CAPSULE ORAL at 18:25

## 2023-05-17 RX ADMIN — SODIUM CHLORIDE 1000 ML: 9 INJECTION, SOLUTION INTRAVENOUS at 20:36

## 2023-05-17 ASSESSMENT — PAIN DESCRIPTION - DESCRIPTORS
DESCRIPTORS: PRESSURE

## 2023-05-17 ASSESSMENT — PAIN DESCRIPTION - LOCATION
LOCATION: ABDOMEN

## 2023-05-17 ASSESSMENT — ENCOUNTER SYMPTOMS
ABDOMINAL DISTENTION: 1
NAUSEA: 1
NAUSEA: 0
ABDOMINAL PAIN: 1
SHORTNESS OF BREATH: 0
COUGH: 0
BACK PAIN: 1
DIARRHEA: 0
VOMITING: 0
VOMITING: 1
CHEST TIGHTNESS: 0
CONSTIPATION: 0

## 2023-05-17 ASSESSMENT — PAIN - FUNCTIONAL ASSESSMENT: PAIN_FUNCTIONAL_ASSESSMENT: 0-10

## 2023-05-17 ASSESSMENT — PAIN SCALES - GENERAL
PAINLEVEL_OUTOF10: 8
PAINLEVEL_OUTOF10: 8
PAINLEVEL_OUTOF10: 9
PAINLEVEL_OUTOF10: 0

## 2023-05-17 ASSESSMENT — PAIN DESCRIPTION - ORIENTATION
ORIENTATION: LOWER;MID
ORIENTATION: LOWER

## 2023-05-18 ENCOUNTER — APPOINTMENT (OUTPATIENT)
Dept: GENERAL RADIOLOGY | Age: 58
DRG: 720 | End: 2023-05-18
Payer: COMMERCIAL

## 2023-05-18 LAB
ALBUMIN SERPL-MCNC: 2.4 G/DL (ref 3.5–5.2)
ALP SERPL-CCNC: 88 U/L (ref 40–129)
ALT SERPL-CCNC: 29 U/L (ref 5–41)
ANION GAP SERPL CALCULATED.3IONS-SCNC: 18 MMOL/L (ref 9–17)
AST SERPL-CCNC: 46 U/L
BILIRUB DIRECT SERPL-MCNC: 0.6 MG/DL
BILIRUB INDIRECT SERPL-MCNC: 0.1 MG/DL (ref 0–1)
BILIRUB SERPL-MCNC: 0.7 MG/DL (ref 0.3–1.2)
BNP SERPL-MCNC: 7575 PG/ML
BUN SERPL-MCNC: 85 MG/DL (ref 6–20)
BUN/CREAT SERPL: 12 (ref 9–20)
C3 SERPL-MCNC: 88 MG/DL (ref 90–180)
C4 SERPL-MCNC: 20 MG/DL (ref 10–40)
CA-I BLD-SCNC: 1.05 MMOL/L (ref 1.13–1.33)
CALCIUM SERPL-MCNC: 7 MG/DL (ref 8.6–10.4)
CHLORIDE SERPL-SCNC: 102 MMOL/L (ref 98–107)
CO2 SERPL-SCNC: 14 MMOL/L (ref 20–31)
CORTISOL COLLECTION INFO: ABNORMAL
CORTISOL: 62.8 UG/DL (ref 2.7–18.4)
CREAT SERPL-MCNC: 7.24 MG/DL (ref 0.7–1.2)
EKG ATRIAL RATE: 166 BPM
EKG ATRIAL RATE: 93 BPM
EKG P AXIS: 53 DEGREES
EKG P-R INTERVAL: 144 MS
EKG Q-T INTERVAL: 286 MS
EKG Q-T INTERVAL: 338 MS
EKG QRS DURATION: 90 MS
EKG QRS DURATION: 94 MS
EKG QTC CALCULATION (BAZETT): 420 MS
EKG QTC CALCULATION (BAZETT): 429 MS
EKG R AXIS: 7 DEGREES
EKG R AXIS: 9 DEGREES
EKG T AXIS: -55 DEGREES
EKG T AXIS: 38 DEGREES
EKG VENTRICULAR RATE: 135 BPM
EKG VENTRICULAR RATE: 93 BPM
ERYTHROCYTE [DISTWIDTH] IN BLOOD BY AUTOMATED COUNT: 14.8 % (ref 11.8–14.4)
FIO2: 28
FREE KAPPA/LAMBDA RATIO: 1.19 (ref 0.26–1.65)
GFR SERPL CREATININE-BSD FRML MDRD: 8 ML/MIN/1.73M2
GLUCOSE BLD-MCNC: 121 MG/DL (ref 75–110)
GLUCOSE BLD-MCNC: 133 MG/DL (ref 75–110)
GLUCOSE SERPL-MCNC: 151 MG/DL (ref 70–99)
HCT VFR BLD AUTO: 36.5 % (ref 40.7–50.3)
HCT VFR BLD AUTO: 39.1 % (ref 40.7–50.3)
HGB BLD-MCNC: 12.1 G/DL (ref 13–17)
HGB BLD-MCNC: 13 G/DL (ref 13–17)
KAPPA LC FREE SER-MCNC: 5.24 MG/DL (ref 0.37–1.94)
LACTATE BLDV-SCNC: 1.8 MMOL/L (ref 0.5–1.9)
LAMBDA LC FREE SERPL-MCNC: 4.41 MG/DL (ref 0.57–2.63)
MCH RBC QN AUTO: 29.4 PG (ref 25.2–33.5)
MCHC RBC AUTO-ENTMCNC: 33.2 G/DL (ref 28.4–34.8)
MCV RBC AUTO: 88.5 FL (ref 82.6–102.9)
NEGATIVE BASE EXCESS, ART: 9 (ref 0–2)
NRBC AUTOMATED: 0 PER 100 WBC
PATIENT TEMP: 37
PLATELET # BLD AUTO: 123 K/UL (ref 138–453)
PMV BLD AUTO: 9.5 FL (ref 8.1–13.5)
POC HCO3: 16.4 MMOL/L (ref 21–28)
POC O2 SATURATION: 97 % (ref 94–98)
POC PCO2: 31.4 MM HG (ref 35–48)
POC PH: 7.33 (ref 7.35–7.45)
POC PO2: 95.7 MM HG (ref 83–108)
POTASSIUM SERPL-SCNC: 4.7 MMOL/L (ref 3.7–5.3)
PROT SERPL-MCNC: 5 G/DL (ref 6.4–8.3)
RBC # BLD AUTO: 4.42 M/UL (ref 4.21–5.77)
REASON FOR REJECTION: NORMAL
SODIUM SERPL-SCNC: 134 MMOL/L (ref 135–144)
SPECIMEN SOURCE: NORMAL
TROPONIN I SERPL HS-MCNC: 82 NG/L (ref 0–22)
WBC OTHER # BLD: 29.8 K/UL (ref 3.5–11.3)
ZZ NTE CLEAN UP: ORDERED TEST: NORMAL

## 2023-05-18 PROCEDURE — 2000000000 HC ICU R&B

## 2023-05-18 PROCEDURE — 84484 ASSAY OF TROPONIN QUANT: CPT

## 2023-05-18 PROCEDURE — 4A133B1 MONITORING OF ARTERIAL PRESSURE, PERIPHERAL, PERCUTANEOUS APPROACH: ICD-10-PCS | Performed by: INTERNAL MEDICINE

## 2023-05-18 PROCEDURE — 86334 IMMUNOFIX E-PHORESIS SERUM: CPT

## 2023-05-18 PROCEDURE — 02HV33Z INSERTION OF INFUSION DEVICE INTO SUPERIOR VENA CAVA, PERCUTANEOUS APPROACH: ICD-10-PCS | Performed by: INTERNAL MEDICINE

## 2023-05-18 PROCEDURE — 85018 HEMOGLOBIN: CPT

## 2023-05-18 PROCEDURE — 4A133J1 MONITORING OF ARTERIAL PULSE, PERIPHERAL, PERCUTANEOUS APPROACH: ICD-10-PCS | Performed by: INTERNAL MEDICINE

## 2023-05-18 PROCEDURE — 85027 COMPLETE CBC AUTOMATED: CPT

## 2023-05-18 PROCEDURE — 6370000000 HC RX 637 (ALT 250 FOR IP): Performed by: INTERNAL MEDICINE

## 2023-05-18 PROCEDURE — 84155 ASSAY OF PROTEIN SERUM: CPT

## 2023-05-18 PROCEDURE — 83605 ASSAY OF LACTIC ACID: CPT

## 2023-05-18 PROCEDURE — 80076 HEPATIC FUNCTION PANEL: CPT

## 2023-05-18 PROCEDURE — 83880 ASSAY OF NATRIURETIC PEPTIDE: CPT

## 2023-05-18 PROCEDURE — 03HY32Z INSERTION OF MONITORING DEVICE INTO UPPER ARTERY, PERCUTANEOUS APPROACH: ICD-10-PCS | Performed by: INTERNAL MEDICINE

## 2023-05-18 PROCEDURE — 93010 ELECTROCARDIOGRAM REPORT: CPT | Performed by: INTERNAL MEDICINE

## 2023-05-18 PROCEDURE — 2500000003 HC RX 250 WO HCPCS: Performed by: INTERNAL MEDICINE

## 2023-05-18 PROCEDURE — 82533 TOTAL CORTISOL: CPT

## 2023-05-18 PROCEDURE — 86038 ANTINUCLEAR ANTIBODIES: CPT

## 2023-05-18 PROCEDURE — 84165 PROTEIN E-PHORESIS SERUM: CPT

## 2023-05-18 PROCEDURE — 82947 ASSAY GLUCOSE BLOOD QUANT: CPT

## 2023-05-18 PROCEDURE — 2580000003 HC RX 258: Performed by: NURSE PRACTITIONER

## 2023-05-18 PROCEDURE — 83516 IMMUNOASSAY NONANTIBODY: CPT

## 2023-05-18 PROCEDURE — 36620 INSERTION CATHETER ARTERY: CPT

## 2023-05-18 PROCEDURE — 86160 COMPLEMENT ANTIGEN: CPT

## 2023-05-18 PROCEDURE — 71045 X-RAY EXAM CHEST 1 VIEW: CPT

## 2023-05-18 PROCEDURE — 37799 UNLISTED PX VASCULAR SURGERY: CPT

## 2023-05-18 PROCEDURE — 82330 ASSAY OF CALCIUM: CPT

## 2023-05-18 PROCEDURE — 36415 COLL VENOUS BLD VENIPUNCTURE: CPT

## 2023-05-18 PROCEDURE — 36556 INSERT NON-TUNNEL CV CATH: CPT

## 2023-05-18 PROCEDURE — 6370000000 HC RX 637 (ALT 250 FOR IP): Performed by: NURSE PRACTITIONER

## 2023-05-18 PROCEDURE — 2580000003 HC RX 258: Performed by: INTERNAL MEDICINE

## 2023-05-18 PROCEDURE — 85014 HEMATOCRIT: CPT

## 2023-05-18 PROCEDURE — 99254 IP/OBS CNSLTJ NEW/EST MOD 60: CPT | Performed by: NURSE PRACTITIONER

## 2023-05-18 PROCEDURE — 2500000003 HC RX 250 WO HCPCS: Performed by: NURSE PRACTITIONER

## 2023-05-18 PROCEDURE — 86225 DNA ANTIBODY NATIVE: CPT

## 2023-05-18 PROCEDURE — 6360000002 HC RX W HCPCS: Performed by: INTERNAL MEDICINE

## 2023-05-18 PROCEDURE — 83521 IG LIGHT CHAINS FREE EACH: CPT

## 2023-05-18 PROCEDURE — 99232 SBSQ HOSP IP/OBS MODERATE 35: CPT | Performed by: FAMILY MEDICINE

## 2023-05-18 PROCEDURE — 6360000002 HC RX W HCPCS: Performed by: NURSE PRACTITIONER

## 2023-05-18 PROCEDURE — 80048 BASIC METABOLIC PNL TOTAL CA: CPT

## 2023-05-18 PROCEDURE — 6370000000 HC RX 637 (ALT 250 FOR IP): Performed by: FAMILY MEDICINE

## 2023-05-18 PROCEDURE — 82803 BLOOD GASES ANY COMBINATION: CPT

## 2023-05-18 RX ORDER — CHLORPROMAZINE HYDROCHLORIDE 25 MG/1
25 TABLET, FILM COATED ORAL ONCE
Status: COMPLETED | OUTPATIENT
Start: 2023-05-18 | End: 2023-05-18

## 2023-05-18 RX ORDER — DEXTROSE AND SODIUM CHLORIDE 5; .45 G/100ML; G/100ML
INJECTION, SOLUTION INTRAVENOUS CONTINUOUS
Status: DISCONTINUED | OUTPATIENT
Start: 2023-05-18 | End: 2023-05-18 | Stop reason: CLARIF

## 2023-05-18 RX ORDER — SODIUM BICARBONATE 650 MG/1
1300 TABLET ORAL 2 TIMES DAILY
Status: COMPLETED | OUTPATIENT
Start: 2023-05-18 | End: 2023-05-20

## 2023-05-18 RX ADMIN — SODIUM BICARBONATE 1300 MG: 650 TABLET ORAL at 09:36

## 2023-05-18 RX ADMIN — SODIUM CHLORIDE, PRESERVATIVE FREE 10 ML: 5 INJECTION INTRAVENOUS at 22:18

## 2023-05-18 RX ADMIN — LINEZOLID 600 MG: 600 INJECTION, SOLUTION INTRAVENOUS at 11:42

## 2023-05-18 RX ADMIN — PANTOPRAZOLE SODIUM 40 MG: 40 TABLET, DELAYED RELEASE ORAL at 09:36

## 2023-05-18 RX ADMIN — CHLORPROMAZINE HYDROCHLORIDE 25 MG: 25 TABLET, FILM COATED ORAL at 09:54

## 2023-05-18 RX ADMIN — HEPARIN SODIUM 5000 UNITS: 5000 INJECTION INTRAVENOUS; SUBCUTANEOUS at 22:18

## 2023-05-18 RX ADMIN — SODIUM BICARBONATE 1300 MG: 650 TABLET ORAL at 22:18

## 2023-05-18 RX ADMIN — SODIUM BICARBONATE 1300 MG: 650 TABLET ORAL at 01:13

## 2023-05-18 RX ADMIN — VASOPRESSIN 0.01 UNITS/MIN: 20 INJECTION INTRAVENOUS at 00:31

## 2023-05-18 RX ADMIN — SODIUM CHLORIDE: 9 INJECTION, SOLUTION INTRAVENOUS at 00:38

## 2023-05-18 RX ADMIN — TAMSULOSIN HYDROCHLORIDE 0.4 MG: 0.4 CAPSULE ORAL at 09:36

## 2023-05-18 RX ADMIN — Medication 25 MCG/MIN: at 00:23

## 2023-05-18 RX ADMIN — SODIUM CHLORIDE: 9 INJECTION, SOLUTION INTRAVENOUS at 08:17

## 2023-05-18 RX ADMIN — MEROPENEM 1000 MG: 1 INJECTION, POWDER, FOR SOLUTION INTRAVENOUS at 00:42

## 2023-05-18 RX ADMIN — SODIUM BICARBONATE: 84 INJECTION, SOLUTION INTRAVENOUS at 18:06

## 2023-05-18 RX ADMIN — Medication 19 MCG/MIN: at 06:18

## 2023-05-18 RX ADMIN — SODIUM BICARBONATE: 84 INJECTION, SOLUTION INTRAVENOUS at 10:04

## 2023-05-18 RX ADMIN — SODIUM CHLORIDE, PRESERVATIVE FREE 10 ML: 5 INJECTION INTRAVENOUS at 09:37

## 2023-05-18 RX ADMIN — LINEZOLID 600 MG: 600 INJECTION, SOLUTION INTRAVENOUS at 22:17

## 2023-05-18 RX ADMIN — HEPARIN SODIUM 5000 UNITS: 5000 INJECTION INTRAVENOUS; SUBCUTANEOUS at 09:37

## 2023-05-18 RX ADMIN — Medication 30 MCG/MIN: at 23:59

## 2023-05-18 ASSESSMENT — ENCOUNTER SYMPTOMS
ABDOMINAL PAIN: 0
SHORTNESS OF BREATH: 0
SINUS PRESSURE: 0
CHEST TIGHTNESS: 0
CHOKING: 0
VOMITING: 0
CONSTIPATION: 0
BACK PAIN: 0
WHEEZING: 0
RHINORRHEA: 0
VOICE CHANGE: 0
NAUSEA: 0
COUGH: 0
DIARRHEA: 0

## 2023-05-18 ASSESSMENT — PAIN SCALES - GENERAL
PAINLEVEL_OUTOF10: 0
PAINLEVEL_OUTOF10: 0

## 2023-05-19 ENCOUNTER — APPOINTMENT (OUTPATIENT)
Dept: GENERAL RADIOLOGY | Age: 58
DRG: 720 | End: 2023-05-19
Payer: COMMERCIAL

## 2023-05-19 LAB
ANA SER QL IA: NEGATIVE
ANCA MYELOPEROXIDASE: <0.3 AU/ML (ref 0–3.5)
ANCA PROTEINASE 3: <0.7 AU/ML (ref 0–2)
ANION GAP SERPL CALCULATED.3IONS-SCNC: 13 MMOL/L (ref 9–17)
BASOPHILS # BLD: 0 K/UL (ref 0–0.2)
BASOPHILS NFR BLD: 0 %
BUN SERPL-MCNC: 85 MG/DL (ref 6–20)
BUN/CREAT SERPL: 17 (ref 9–20)
CALCIUM SERPL-MCNC: 6.5 MG/DL (ref 8.6–10.4)
CHLORIDE SERPL-SCNC: 104 MMOL/L (ref 98–107)
CO2 SERPL-SCNC: 18 MMOL/L (ref 20–31)
CREAT SERPL-MCNC: 5.14 MG/DL (ref 0.7–1.2)
DSDNA IGG SER QL IA: 0.9 IU/ML
EOSINOPHIL # BLD: 0.24 K/UL (ref 0–0.4)
EOSINOPHILS RELATIVE PERCENT: 1 % (ref 1–4)
ERYTHROCYTE [DISTWIDTH] IN BLOOD BY AUTOMATED COUNT: 15.1 % (ref 11.8–14.4)
GFR SERPL CREATININE-BSD FRML MDRD: 12 ML/MIN/1.73M2
GLUCOSE BLD-MCNC: 112 MG/DL (ref 75–110)
GLUCOSE BLD-MCNC: 123 MG/DL (ref 75–110)
GLUCOSE SERPL-MCNC: 171 MG/DL (ref 70–99)
HCT VFR BLD AUTO: 36.6 % (ref 40.7–50.3)
HGB BLD-MCNC: 12.2 G/DL (ref 13–17)
IMM GRANULOCYTES # BLD AUTO: 0.94 K/UL (ref 0–0.3)
IMM GRANULOCYTES NFR BLD: 4 %
LYMPHOCYTES # BLD: 3 % (ref 24–44)
LYMPHOCYTES NFR BLD: 0.71 K/UL (ref 1–4.8)
MCH RBC QN AUTO: 29.3 PG (ref 25.2–33.5)
MCHC RBC AUTO-ENTMCNC: 33.3 G/DL (ref 28.4–34.8)
MCV RBC AUTO: 88 FL (ref 82.6–102.9)
MONOCYTES NFR BLD: 1.18 K/UL (ref 0.2–0.8)
MONOCYTES NFR BLD: 5 % (ref 1–7)
NEUTROPHILS NFR BLD: 87 % (ref 36–66)
NEUTS SEG NFR BLD: 20.43 K/UL (ref 1.8–7.7)
NRBC AUTOMATED: 0 PER 100 WBC
NUCLEAR IGG SER IA-RTO: 0.1 U/ML
PLATELET # BLD AUTO: 135 K/UL (ref 138–453)
PMV BLD AUTO: 10.1 FL (ref 8.1–13.5)
POTASSIUM SERPL-SCNC: 3.6 MMOL/L (ref 3.7–5.3)
RBC # BLD AUTO: 4.16 M/UL (ref 4.21–5.77)
SODIUM SERPL-SCNC: 135 MMOL/L (ref 135–144)
WBC OTHER # BLD: 23.5 K/UL (ref 3.5–11.3)

## 2023-05-19 PROCEDURE — 6370000000 HC RX 637 (ALT 250 FOR IP): Performed by: INTERNAL MEDICINE

## 2023-05-19 PROCEDURE — 2580000003 HC RX 258: Performed by: FAMILY MEDICINE

## 2023-05-19 PROCEDURE — 99233 SBSQ HOSP IP/OBS HIGH 50: CPT | Performed by: INTERNAL MEDICINE

## 2023-05-19 PROCEDURE — 2000000000 HC ICU R&B

## 2023-05-19 PROCEDURE — 2580000003 HC RX 258: Performed by: INTERNAL MEDICINE

## 2023-05-19 PROCEDURE — 82947 ASSAY GLUCOSE BLOOD QUANT: CPT

## 2023-05-19 PROCEDURE — 94761 N-INVAS EAR/PLS OXIMETRY MLT: CPT

## 2023-05-19 PROCEDURE — 99232 SBSQ HOSP IP/OBS MODERATE 35: CPT | Performed by: FAMILY MEDICINE

## 2023-05-19 PROCEDURE — 36415 COLL VENOUS BLD VENIPUNCTURE: CPT

## 2023-05-19 PROCEDURE — 71045 X-RAY EXAM CHEST 1 VIEW: CPT

## 2023-05-19 PROCEDURE — 2500000003 HC RX 250 WO HCPCS: Performed by: NURSE PRACTITIONER

## 2023-05-19 PROCEDURE — 6360000002 HC RX W HCPCS: Performed by: FAMILY MEDICINE

## 2023-05-19 PROCEDURE — 6360000002 HC RX W HCPCS: Performed by: INTERNAL MEDICINE

## 2023-05-19 PROCEDURE — 80048 BASIC METABOLIC PNL TOTAL CA: CPT

## 2023-05-19 PROCEDURE — 6370000000 HC RX 637 (ALT 250 FOR IP): Performed by: NURSE PRACTITIONER

## 2023-05-19 PROCEDURE — 2700000000 HC OXYGEN THERAPY PER DAY

## 2023-05-19 PROCEDURE — 2580000003 HC RX 258: Performed by: NURSE PRACTITIONER

## 2023-05-19 PROCEDURE — 85025 COMPLETE CBC W/AUTO DIFF WBC: CPT

## 2023-05-19 PROCEDURE — 2500000003 HC RX 250 WO HCPCS: Performed by: INTERNAL MEDICINE

## 2023-05-19 RX ORDER — CALCIUM GLUCONATE 20 MG/ML
2000 INJECTION, SOLUTION INTRAVENOUS ONCE
Status: COMPLETED | OUTPATIENT
Start: 2023-05-19 | End: 2023-05-19

## 2023-05-19 RX ORDER — FEXOFENADINE HCL 180 MG/1
180 TABLET ORAL DAILY
COMMUNITY

## 2023-05-19 RX ORDER — M-VIT,TX,IRON,MINS/CALC/FOLIC 27MG-0.4MG
1 TABLET ORAL DAILY
COMMUNITY

## 2023-05-19 RX ORDER — POTASSIUM CHLORIDE 20 MEQ/1
40 TABLET, EXTENDED RELEASE ORAL ONCE
Status: COMPLETED | OUTPATIENT
Start: 2023-05-19 | End: 2023-05-19

## 2023-05-19 RX ORDER — SODIUM CHLORIDE 9 MG/ML
INJECTION, SOLUTION INTRAVENOUS CONTINUOUS
Status: DISCONTINUED | OUTPATIENT
Start: 2023-05-19 | End: 2023-05-20

## 2023-05-19 RX ORDER — FLUTICASONE PROPIONATE 50 MCG
2 SPRAY, SUSPENSION (ML) NASAL DAILY
Status: DISCONTINUED | OUTPATIENT
Start: 2023-05-19 | End: 2023-05-22 | Stop reason: HOSPADM

## 2023-05-19 RX ADMIN — SODIUM CHLORIDE, PRESERVATIVE FREE 10 ML: 5 INJECTION INTRAVENOUS at 09:24

## 2023-05-19 RX ADMIN — SODIUM BICARBONATE: 84 INJECTION, SOLUTION INTRAVENOUS at 02:32

## 2023-05-19 RX ADMIN — POTASSIUM CHLORIDE 40 MEQ: 1500 TABLET, EXTENDED RELEASE ORAL at 13:20

## 2023-05-19 RX ADMIN — TAMSULOSIN HYDROCHLORIDE 0.4 MG: 0.4 CAPSULE ORAL at 09:23

## 2023-05-19 RX ADMIN — SODIUM BICARBONATE 1300 MG: 650 TABLET ORAL at 20:34

## 2023-05-19 RX ADMIN — CEFTRIAXONE SODIUM 1000 MG: 1 INJECTION, POWDER, FOR SOLUTION INTRAMUSCULAR; INTRAVENOUS at 09:37

## 2023-05-19 RX ADMIN — SODIUM CHLORIDE: 9 INJECTION, SOLUTION INTRAVENOUS at 20:35

## 2023-05-19 RX ADMIN — SODIUM CHLORIDE: 9 INJECTION, SOLUTION INTRAVENOUS at 12:49

## 2023-05-19 RX ADMIN — SODIUM CHLORIDE, PRESERVATIVE FREE 10 ML: 5 INJECTION INTRAVENOUS at 20:34

## 2023-05-19 RX ADMIN — MEROPENEM 500 MG: 500 INJECTION, POWDER, FOR SOLUTION INTRAVENOUS at 01:22

## 2023-05-19 RX ADMIN — CALCIUM GLUCONATE 2000 MG: 20 INJECTION, SOLUTION INTRAVENOUS at 13:19

## 2023-05-19 RX ADMIN — SODIUM BICARBONATE 1300 MG: 650 TABLET ORAL at 09:22

## 2023-05-19 RX ADMIN — PANTOPRAZOLE SODIUM 40 MG: 40 TABLET, DELAYED RELEASE ORAL at 09:22

## 2023-05-19 RX ADMIN — Medication 8 MCG/MIN: at 12:48

## 2023-05-19 RX ADMIN — SODIUM CHLORIDE: 9 INJECTION, SOLUTION INTRAVENOUS at 05:32

## 2023-05-19 RX ADMIN — FLUTICASONE PROPIONATE 2 SPRAY: 50 SPRAY, METERED NASAL at 22:40

## 2023-05-19 ASSESSMENT — ENCOUNTER SYMPTOMS
CONSTIPATION: 0
GASTROINTESTINAL NEGATIVE: 1
BACK PAIN: 0
DIARRHEA: 0
RHINORRHEA: 0
CHOKING: 0
NAUSEA: 0
ABDOMINAL PAIN: 0
WHEEZING: 0
RESPIRATORY NEGATIVE: 1
VOICE CHANGE: 0
COUGH: 0
SINUS PRESSURE: 0
VOMITING: 0
CHEST TIGHTNESS: 0
SHORTNESS OF BREATH: 0

## 2023-05-20 LAB
ANION GAP SERPL CALCULATED.3IONS-SCNC: 9 MMOL/L (ref 9–17)
BUN SERPL-MCNC: 65 MG/DL (ref 6–20)
BUN/CREAT SERPL: 20 (ref 9–20)
CALCIUM SERPL-MCNC: 7.3 MG/DL (ref 8.6–10.4)
CHLORIDE SERPL-SCNC: 107 MMOL/L (ref 98–107)
CO2 SERPL-SCNC: 21 MMOL/L (ref 20–31)
CREAT SERPL-MCNC: 3.32 MG/DL (ref 0.7–1.2)
ERYTHROCYTE [DISTWIDTH] IN BLOOD BY AUTOMATED COUNT: 15.3 % (ref 11.8–14.4)
GFR SERPL CREATININE-BSD FRML MDRD: 21 ML/MIN/1.73M2
GLUCOSE BLD-MCNC: 83 MG/DL (ref 75–110)
GLUCOSE SERPL-MCNC: 94 MG/DL (ref 70–99)
HCT VFR BLD AUTO: 35.3 % (ref 40.7–50.3)
HGB BLD-MCNC: 11.6 G/DL (ref 13–17)
MCH RBC QN AUTO: 29.2 PG (ref 25.2–33.5)
MCHC RBC AUTO-ENTMCNC: 32.9 G/DL (ref 28.4–34.8)
MCV RBC AUTO: 88.9 FL (ref 82.6–102.9)
MICROORGANISM SPEC CULT: ABNORMAL
NRBC AUTOMATED: 0 PER 100 WBC
PLATELET # BLD AUTO: 116 K/UL (ref 138–453)
PMV BLD AUTO: 10 FL (ref 8.1–13.5)
POTASSIUM SERPL-SCNC: 4.3 MMOL/L (ref 3.7–5.3)
RBC # BLD AUTO: 3.97 M/UL (ref 4.21–5.77)
SODIUM SERPL-SCNC: 137 MMOL/L (ref 135–144)
SPECIMEN DESCRIPTION: ABNORMAL
WBC OTHER # BLD: 14.6 K/UL (ref 3.5–11.3)

## 2023-05-20 PROCEDURE — 6360000002 HC RX W HCPCS: Performed by: NURSE PRACTITIONER

## 2023-05-20 PROCEDURE — 2580000003 HC RX 258: Performed by: NURSE PRACTITIONER

## 2023-05-20 PROCEDURE — 2580000003 HC RX 258: Performed by: INTERNAL MEDICINE

## 2023-05-20 PROCEDURE — 82947 ASSAY GLUCOSE BLOOD QUANT: CPT

## 2023-05-20 PROCEDURE — 6370000000 HC RX 637 (ALT 250 FOR IP): Performed by: INTERNAL MEDICINE

## 2023-05-20 PROCEDURE — 36415 COLL VENOUS BLD VENIPUNCTURE: CPT

## 2023-05-20 PROCEDURE — 80048 BASIC METABOLIC PNL TOTAL CA: CPT

## 2023-05-20 PROCEDURE — 85027 COMPLETE CBC AUTOMATED: CPT

## 2023-05-20 PROCEDURE — 6360000002 HC RX W HCPCS: Performed by: FAMILY MEDICINE

## 2023-05-20 PROCEDURE — 6370000000 HC RX 637 (ALT 250 FOR IP): Performed by: FAMILY MEDICINE

## 2023-05-20 PROCEDURE — 2580000003 HC RX 258: Performed by: FAMILY MEDICINE

## 2023-05-20 PROCEDURE — 99232 SBSQ HOSP IP/OBS MODERATE 35: CPT | Performed by: FAMILY MEDICINE

## 2023-05-20 PROCEDURE — 2060000000 HC ICU INTERMEDIATE R&B

## 2023-05-20 PROCEDURE — 99232 SBSQ HOSP IP/OBS MODERATE 35: CPT | Performed by: INTERNAL MEDICINE

## 2023-05-20 PROCEDURE — 6370000000 HC RX 637 (ALT 250 FOR IP): Performed by: NURSE PRACTITIONER

## 2023-05-20 RX ORDER — HYDROXYZINE HYDROCHLORIDE 25 MG/ML
12.5 INJECTION, SOLUTION INTRAMUSCULAR NIGHTLY PRN
Status: DISCONTINUED | OUTPATIENT
Start: 2023-05-20 | End: 2023-05-22 | Stop reason: HOSPADM

## 2023-05-20 RX ORDER — CALCIUM GLUCONATE 20 MG/ML
2000 INJECTION, SOLUTION INTRAVENOUS ONCE
Status: COMPLETED | OUTPATIENT
Start: 2023-05-20 | End: 2023-05-20

## 2023-05-20 RX ORDER — LANOLIN ALCOHOL/MO/W.PET/CERES
3 CREAM (GRAM) TOPICAL NIGHTLY PRN
Status: DISCONTINUED | OUTPATIENT
Start: 2023-05-20 | End: 2023-05-22 | Stop reason: HOSPADM

## 2023-05-20 RX ORDER — DEXTROSE AND SODIUM CHLORIDE 5; .9 G/100ML; G/100ML
INJECTION, SOLUTION INTRAVENOUS CONTINUOUS
Status: DISCONTINUED | OUTPATIENT
Start: 2023-05-20 | End: 2023-05-21

## 2023-05-20 RX ORDER — CEFDINIR 300 MG/1
300 CAPSULE ORAL DAILY
Qty: 10 CAPSULE | Refills: 0 | Status: SHIPPED | OUTPATIENT
Start: 2023-05-20 | End: 2023-05-30

## 2023-05-20 RX ADMIN — SODIUM BICARBONATE 1300 MG: 650 TABLET ORAL at 08:39

## 2023-05-20 RX ADMIN — DEXTROSE AND SODIUM CHLORIDE: 5; 900 INJECTION, SOLUTION INTRAVENOUS at 15:51

## 2023-05-20 RX ADMIN — SODIUM CHLORIDE: 9 INJECTION, SOLUTION INTRAVENOUS at 03:36

## 2023-05-20 RX ADMIN — SODIUM CHLORIDE, PRESERVATIVE FREE 10 ML: 5 INJECTION INTRAVENOUS at 21:01

## 2023-05-20 RX ADMIN — Medication 3 MG: at 21:01

## 2023-05-20 RX ADMIN — SODIUM CHLORIDE, PRESERVATIVE FREE 10 ML: 5 INJECTION INTRAVENOUS at 08:40

## 2023-05-20 RX ADMIN — CEFTRIAXONE SODIUM 1000 MG: 1 INJECTION, POWDER, FOR SOLUTION INTRAMUSCULAR; INTRAVENOUS at 08:47

## 2023-05-20 RX ADMIN — FLUTICASONE PROPIONATE 2 SPRAY: 50 SPRAY, METERED NASAL at 08:40

## 2023-05-20 RX ADMIN — CALCIUM GLUCONATE 2000 MG: 20 INJECTION, SOLUTION INTRAVENOUS at 11:26

## 2023-05-20 RX ADMIN — TAMSULOSIN HYDROCHLORIDE 0.4 MG: 0.4 CAPSULE ORAL at 08:39

## 2023-05-20 RX ADMIN — PANTOPRAZOLE SODIUM 40 MG: 40 TABLET, DELAYED RELEASE ORAL at 08:39

## 2023-05-20 ASSESSMENT — ENCOUNTER SYMPTOMS
NAUSEA: 0
RHINORRHEA: 0
BACK PAIN: 0
CHEST TIGHTNESS: 0
ABDOMINAL PAIN: 0
GASTROINTESTINAL NEGATIVE: 1
SHORTNESS OF BREATH: 0
DIARRHEA: 0
WHEEZING: 0
CONSTIPATION: 0
VOMITING: 0
SINUS PRESSURE: 0
COUGH: 0
CHOKING: 0
VOICE CHANGE: 0
RESPIRATORY NEGATIVE: 1

## 2023-05-20 ASSESSMENT — PAIN SCALES - GENERAL
PAINLEVEL_OUTOF10: 0
PAINLEVEL_OUTOF10: 0

## 2023-05-21 LAB
ANION GAP SERPL CALCULATED.3IONS-SCNC: 9 MMOL/L (ref 9–17)
BUN SERPL-MCNC: 44 MG/DL (ref 6–20)
BUN/CREAT SERPL: 20 (ref 9–20)
CALCIUM SERPL-MCNC: 8 MG/DL (ref 8.6–10.4)
CHLORIDE SERPL-SCNC: 108 MMOL/L (ref 98–107)
CO2 SERPL-SCNC: 23 MMOL/L (ref 20–31)
CREAT SERPL-MCNC: 2.23 MG/DL (ref 0.7–1.2)
GFR SERPL CREATININE-BSD FRML MDRD: 33 ML/MIN/1.73M2
GLUCOSE SERPL-MCNC: 107 MG/DL (ref 70–99)
POTASSIUM SERPL-SCNC: 4.5 MMOL/L (ref 3.7–5.3)
SODIUM SERPL-SCNC: 140 MMOL/L (ref 135–144)

## 2023-05-21 PROCEDURE — 6360000002 HC RX W HCPCS: Performed by: FAMILY MEDICINE

## 2023-05-21 PROCEDURE — 80048 BASIC METABOLIC PNL TOTAL CA: CPT

## 2023-05-21 PROCEDURE — 2580000003 HC RX 258: Performed by: FAMILY MEDICINE

## 2023-05-21 PROCEDURE — 6370000000 HC RX 637 (ALT 250 FOR IP): Performed by: INTERNAL MEDICINE

## 2023-05-21 PROCEDURE — 2580000003 HC RX 258: Performed by: INTERNAL MEDICINE

## 2023-05-21 PROCEDURE — 99232 SBSQ HOSP IP/OBS MODERATE 35: CPT | Performed by: INTERNAL MEDICINE

## 2023-05-21 PROCEDURE — 36415 COLL VENOUS BLD VENIPUNCTURE: CPT

## 2023-05-21 PROCEDURE — 99232 SBSQ HOSP IP/OBS MODERATE 35: CPT | Performed by: FAMILY MEDICINE

## 2023-05-21 PROCEDURE — 2060000000 HC ICU INTERMEDIATE R&B

## 2023-05-21 PROCEDURE — 2580000003 HC RX 258: Performed by: NURSE PRACTITIONER

## 2023-05-21 PROCEDURE — 94761 N-INVAS EAR/PLS OXIMETRY MLT: CPT

## 2023-05-21 PROCEDURE — 6370000000 HC RX 637 (ALT 250 FOR IP): Performed by: NURSE PRACTITIONER

## 2023-05-21 PROCEDURE — 2700000000 HC OXYGEN THERAPY PER DAY

## 2023-05-21 RX ORDER — DEXTROSE AND SODIUM CHLORIDE 5; .45 G/100ML; G/100ML
INJECTION, SOLUTION INTRAVENOUS CONTINUOUS
Status: DISCONTINUED | OUTPATIENT
Start: 2023-05-21 | End: 2023-05-22 | Stop reason: HOSPADM

## 2023-05-21 RX ADMIN — TAMSULOSIN HYDROCHLORIDE 0.4 MG: 0.4 CAPSULE ORAL at 08:23

## 2023-05-21 RX ADMIN — PANTOPRAZOLE SODIUM 40 MG: 40 TABLET, DELAYED RELEASE ORAL at 08:23

## 2023-05-21 RX ADMIN — DEXTROSE AND SODIUM CHLORIDE: 5; 450 INJECTION, SOLUTION INTRAVENOUS at 14:15

## 2023-05-21 RX ADMIN — SODIUM CHLORIDE, PRESERVATIVE FREE 10 ML: 5 INJECTION INTRAVENOUS at 08:23

## 2023-05-21 RX ADMIN — DEXTROSE AND SODIUM CHLORIDE: 5; 900 INJECTION, SOLUTION INTRAVENOUS at 08:34

## 2023-05-21 RX ADMIN — CEFTRIAXONE SODIUM 1000 MG: 1 INJECTION, POWDER, FOR SOLUTION INTRAMUSCULAR; INTRAVENOUS at 08:35

## 2023-05-21 RX ADMIN — SODIUM CHLORIDE, PRESERVATIVE FREE 10 ML: 5 INJECTION INTRAVENOUS at 20:06

## 2023-05-21 RX ADMIN — ACETAMINOPHEN 650 MG: 325 TABLET ORAL at 14:18

## 2023-05-21 ASSESSMENT — ENCOUNTER SYMPTOMS
GASTROINTESTINAL NEGATIVE: 1
CONSTIPATION: 0
VOICE CHANGE: 0
COUGH: 0
SHORTNESS OF BREATH: 0
VOMITING: 0
RHINORRHEA: 0
DIARRHEA: 0
NAUSEA: 0
ABDOMINAL PAIN: 0
SINUS PRESSURE: 0
WHEEZING: 0
CHEST TIGHTNESS: 0
CHOKING: 0
RESPIRATORY NEGATIVE: 1
BACK PAIN: 0

## 2023-05-21 ASSESSMENT — PAIN SCALES - GENERAL
PAINLEVEL_OUTOF10: 5
PAINLEVEL_OUTOF10: 0
PAINLEVEL_OUTOF10: 0

## 2023-05-22 VITALS
HEIGHT: 64 IN | DIASTOLIC BLOOD PRESSURE: 91 MMHG | OXYGEN SATURATION: 97 % | WEIGHT: 154.32 LBS | TEMPERATURE: 97.5 F | BODY MASS INDEX: 26.35 KG/M2 | RESPIRATION RATE: 18 BRPM | SYSTOLIC BLOOD PRESSURE: 160 MMHG | HEART RATE: 63 BPM

## 2023-05-22 LAB
ANION GAP SERPL CALCULATED.3IONS-SCNC: 9 MMOL/L (ref 9–17)
BUN SERPL-MCNC: 35 MG/DL (ref 6–20)
BUN/CREAT SERPL: 18 (ref 9–20)
CALCIUM SERPL-MCNC: 8.4 MG/DL (ref 8.6–10.4)
CHLORIDE SERPL-SCNC: 105 MMOL/L (ref 98–107)
CO2 SERPL-SCNC: 24 MMOL/L (ref 20–31)
CREAT SERPL-MCNC: 1.92 MG/DL (ref 0.7–1.2)
GFR SERPL CREATININE-BSD FRML MDRD: 40 ML/MIN/1.73M2
GLUCOSE BLD-MCNC: 99 MG/DL (ref 75–110)
GLUCOSE SERPL-MCNC: 128 MG/DL (ref 70–99)
POTASSIUM SERPL-SCNC: 4.2 MMOL/L (ref 3.7–5.3)
SODIUM SERPL-SCNC: 138 MMOL/L (ref 135–144)

## 2023-05-22 PROCEDURE — 2700000000 HC OXYGEN THERAPY PER DAY

## 2023-05-22 PROCEDURE — 99238 HOSP IP/OBS DSCHRG MGMT 30/<: CPT | Performed by: FAMILY MEDICINE

## 2023-05-22 PROCEDURE — 6360000002 HC RX W HCPCS: Performed by: FAMILY MEDICINE

## 2023-05-22 PROCEDURE — 82947 ASSAY GLUCOSE BLOOD QUANT: CPT

## 2023-05-22 PROCEDURE — 94761 N-INVAS EAR/PLS OXIMETRY MLT: CPT

## 2023-05-22 PROCEDURE — 80048 BASIC METABOLIC PNL TOTAL CA: CPT

## 2023-05-22 PROCEDURE — 6370000000 HC RX 637 (ALT 250 FOR IP): Performed by: INTERNAL MEDICINE

## 2023-05-22 PROCEDURE — 6370000000 HC RX 637 (ALT 250 FOR IP): Performed by: NURSE PRACTITIONER

## 2023-05-22 PROCEDURE — 2580000003 HC RX 258: Performed by: FAMILY MEDICINE

## 2023-05-22 PROCEDURE — 2580000003 HC RX 258: Performed by: NURSE PRACTITIONER

## 2023-05-22 PROCEDURE — 36415 COLL VENOUS BLD VENIPUNCTURE: CPT

## 2023-05-22 PROCEDURE — 99231 SBSQ HOSP IP/OBS SF/LOW 25: CPT | Performed by: INTERNAL MEDICINE

## 2023-05-22 RX ADMIN — PANTOPRAZOLE SODIUM 40 MG: 40 TABLET, DELAYED RELEASE ORAL at 08:42

## 2023-05-22 RX ADMIN — FLUTICASONE PROPIONATE 2 SPRAY: 50 SPRAY, METERED NASAL at 08:46

## 2023-05-22 RX ADMIN — CEFTRIAXONE SODIUM 1000 MG: 1 INJECTION, POWDER, FOR SOLUTION INTRAMUSCULAR; INTRAVENOUS at 08:46

## 2023-05-22 RX ADMIN — SODIUM CHLORIDE, PRESERVATIVE FREE 10 ML: 5 INJECTION INTRAVENOUS at 08:42

## 2023-05-22 RX ADMIN — TAMSULOSIN HYDROCHLORIDE 0.4 MG: 0.4 CAPSULE ORAL at 08:42

## 2023-05-22 ASSESSMENT — ENCOUNTER SYMPTOMS
VOMITING: 0
DIARRHEA: 0
ABDOMINAL PAIN: 0
CONSTIPATION: 0
RESPIRATORY NEGATIVE: 1
CHEST TIGHTNESS: 0
SINUS PRESSURE: 0
CHOKING: 0
BACK PAIN: 0
VOICE CHANGE: 0
GASTROINTESTINAL NEGATIVE: 1
RHINORRHEA: 0
NAUSEA: 0
WHEEZING: 0
COUGH: 0
SHORTNESS OF BREATH: 0

## 2023-05-22 NOTE — PROGRESS NOTES
Reviewed discharge instructions with patient. IV removed. Patient d/c home with henderson. No questions at time of discharge.

## 2023-05-22 NOTE — PROGRESS NOTES
Nephrology Progress Note      SUBJECTIVE      Patient seen and examined this morning. His urine output has slowed some to 6.4 liters yesterday, from over 10 liters the day prior. No new issues reported overnight. He continues to have henderson catheter in place with no chest pain or shortness of breath or nausea or vomiting. He is comfortable on room air and has been ambulating. Patient did have improvement in his creatinine to 1.92 mg/dl with a BUN of 35, electrolytes showed within normal limits with a creatinine of 1.92. Peak creatinine this admission was 7.34 mg/dl. Patient does seem to have post-obstructive diuresis, improving. He remains off pressor support. Patient wishes to go home soon. Will go home with Henderson and Urology f/u. Urine culture has come back positive for E. Coli 2023    OBJECTIVE      CURRENT TEMPERATURE:  Temp: 97.5 °F (36.4 °C)  MAXIMUM TEMPERATURE OVER 24HRS:  Temp (24hrs), Av.8 °F (36.6 °C), Min:97 °F (36.1 °C), Max:98.3 °F (36.8 °C)    CURRENT RESPIRATORY RATE:  Respirations: 18  CURRENT PULSE:  Pulse: 63  CURRENT BLOOD PRESSURE:  BP: (!) 160/91  24HR BLOOD PRESSURE RANGE:  Systolic (02XFK), NCQ:641 , Min:145 , WNS:422   ; Diastolic (04FGD), MUQ:84, Min:87, Max:110    24HR INTAKE/OUTPUT:    Intake/Output Summary (Last 24 hours) at 2023 1010  Last data filed at 2023 2840  Gross per 24 hour   Intake 386.08 ml   Output 6421 ml   Net -6034.92 ml     WEIGHT :Patient Vitals for the past 96 hrs (Last 3 readings):   Weight   23 0600 154 lb 5.2 oz (70 kg)   23 0600 150 lb 9.2 oz (68.3 kg)     PHYSICAL EXAM      GENERAL APPEARANCE:Awake, alert, in no acute distress  SKIN: warm and dry, no rash or erythema  EYES: conjunctivae normal and sclera anicteric  ENT: no thrush, moist mucus membranes  NECK:   No JVD. Trachea is midline and no accessory muscle use.   PULMONARY: Lungs are clear to auscultation bilaterally without wheezes or rales or

## 2023-05-22 NOTE — PROGRESS NOTES
[facility]   Urology Progress Note            Subjective: Follow-up urinary retention acute kidney injury sepsis    Patient Vitals for the past 24 hrs:   BP Temp Temp src Pulse Resp SpO2   05/22/23 0418 -- -- -- 69 -- 90 %   05/22/23 0400 (!) 161/95 97 °F (36.1 °C) Temporal 69 15 91 %   05/22/23 0200 -- -- -- 76 -- 95 %   05/22/23 0100 -- -- -- 70 -- 94 %   05/22/23 0000 (!) 145/93 98.3 °F (36.8 °C) Oral 70 16 97 %   05/21/23 2300 -- -- -- 64 -- 95 %   05/21/23 2200 -- -- -- 73 -- (!) 89 %   05/21/23 2100 -- -- -- 63 -- 93 %   05/21/23 2005 (!) 152/92 98.1 °F (36.7 °C) Oral 74 15 93 %   05/21/23 2000 (!) 149/110 98.1 °F (36.7 °C) Oral 60 16 94 %   05/21/23 1900 (!) 164/87 -- -- 78 -- (!) 89 %   05/21/23 1819 (!) 166/99 -- -- 68 -- 94 %   05/21/23 1411 (!) 161/107 -- -- 67 -- --   05/21/23 1300 (!) 159/93 -- -- 70 14 --   05/21/23 1000 (!) 113/59 -- -- -- -- 90 %   05/21/23 0900 (!) 150/94 -- -- 68 26 99 %   05/21/23 0800 121/86 98.1 °F (36.7 °C) Oral 62 (!) 31 95 %   05/21/23 0700 115/74 -- -- 83 26 96 %       Intake/Output Summary (Last 24 hours) at 5/22/2023 0620  Last data filed at 5/22/2023 0354  Gross per 24 hour   Intake 485.17 ml   Output 6413 ml   Net -5927.83 ml       Recent Labs     05/20/23  0238   WBC 14.6*   HGB 11.6*   HCT 35.3*   MCV 88.9   *     Recent Labs     05/20/23  0238 05/21/23  0252 05/22/23  0243    140 138   K 4.3 4.5 4.2    108* 105   CO2 21 23 24   BUN 65* 44* 35*   CREATININE 3.32* 2.23* 1.92*       No results for input(s): COLORU, PHUR, LABCAST, WBCUA, RBCUA, MUCUS, TRICHOMONAS, YEAST, BACTERIA, CLARITYU, SPECGRAV, LEUKOCYTESUR, UROBILINOGEN, BILIRUBINUR, BLOODU in the last 72 hours.     Invalid input(s): NITRATE, GLUCOSEUKETONESUAMORPHOUS    Additional Lab/culture results:    Physical Exam: Patient alert oriented not in acute distress, creatinine trending down, patient tolerating the catheter well  I have discussed the chronic

## 2023-05-22 NOTE — PROGRESS NOTES
[facility]  Progress Note    Santosh Briseno  MRN: [id number]  Date: 5/22/2023  LOS: 5     Reason for F/U :   Sepsis with septic shock    Impression :   Urinary retention secondary to bladder outlet obstruction [BPH] with severe bilateral hydronephrosis  Status post cystoscopy with stent placement 4/23/2023  History of previously chronic indwelling Garcia catheter recently removed and had unsuccessful self-catheterization at  E. coli urinary tract infection related to above  Septic shock-resolved  Leukocytosis-resolved  Acute kidney injury on chronic kidney disease-improved    Recommendations: The patient was initially treated with meropenem and linezolid  Linezolid was discontinued 5/19/2023  The primary service switch to the antimicrobial therapy to ceftriaxone 5/19/2023  The patient continues on Rocephin while hospitalized  The patient is okay for discharge on Omnicef to complete a 10-day course of therapy on 5/27/2023    Infection Control Recommendations:   Contact precautions    Discharge Planning:   Patient will need Midline Catheter Insertion/ PICC line Insertion: No  Patient will need: Home IV , [place],  SNF,  LTAC: Undetermined  Patient willneed outpatient wound care: No    Medical Decision making / Summary of Stay:   Santosh Briseno is a 59y.o.-year-old male who was initially admitted on 5/17/2023. The patient has a history of BPH with urinary retention has a chronic indwelling Garcia catheter. He has failed multiple voiding trials in the past and has a history of nephrolithiasis. He is actually known to our practice from his December 2022 admission where he was treated for Proteus vulgaris bacteremia secondary to a urinary tract infection. He was treated with ceftriaxone then discharged on Omnicef as the patient stated ciprofloxacin did not work for his infections.   He also had a right knee effusion at that time and it was recommended by Dr. Naye Guillory the patient may benefit from a

## 2023-05-22 NOTE — PROGRESS NOTES
[facility]  Office: [address], DO, Aedlia Rosario, DO, Antonieta Thompson, DO, Donaldo Armstrongaugustus Blood, DO, Mounika Manzo MD, Gabriella Aranda MD, Marcel Plaza MD, Janelle Monroy MD,  Steve Shaffer MD, Renetta Teague MD, Isabel Noe, DO, Radha Ospina MD,  Mari Gonzalez MD, Drew Hui MD, Shamir Little DO, Haile Cruz MD, Greg Long MD, Yudi Mattson DO, Sachin Jung MD, Britt Schroeder MD, Rene Shoemaker MD, Rodger Barrios MD,  Kaylie Monreal DO, Escobar Reddy MD,  Any Felix, CNP,  Juan Manuel Barrientos, CNP, Benigno Mauricio, CNP, Boom Waddell, CNP,  Anya Neff, [facility], Dc Dawn, CNP, Adela Howe, CNP, Erinn Carrasco, CNP, Jaky Mathews, [facility], [facility] ZAYRA Ayala CNP, Sarah Jefferson PA-C, Carlos Garcia, CNS, Raoul Leonardo, [facility], [facility]      Daily Progress Note     Admit Date: 5/17/2023  Bed/Room No.  1109/1109-01  Admitting Physician : Marcel Plaza MD  Code Status :Full Code  Hospital Day:  LOS: 5 days   Chief Complaint:     Chief Complaint   Patient presents with    Urinary Retention    Irregular Heart Beat     Principal Problem:    Acute kidney injury superimposed on chronic kidney disease [facility])  Active Problems:    Benign prostatic hyperplasia with urinary retention    Bilateral hydronephrosis    Bladder outlet obstruction    Urinary retention    Displacement of Garcia catheter [facility])    Tobacco abuse    Acute urinary retention    Tachycardia    Leukocytosis    Stage 2 chronic kidney disease    Sepsis with acute renal failure without septic shock ([facility] [address]    Septic shock ([facility] [address]  Resolved Problems:    * No resolved hospital problems. *    Subjective : Interval History/Significant events :  05/22/23    Patient is eager to leave hospital.  If not discharged he wants to leave AMA. Patient is undressed and wants to get washed. He has Garcia cath in place.   Patient had about 6 L urine output in last

## 2023-05-23 LAB
MICROORGANISM SPEC CULT: NORMAL
MICROORGANISM SPEC CULT: NORMAL
SERVICE CMNT-IMP: NORMAL
SERVICE CMNT-IMP: NORMAL
SPECIMEN DESCRIPTION: NORMAL
SPECIMEN DESCRIPTION: NORMAL

## 2023-05-23 NOTE — DISCHARGE SUMMARY
known as: VIAGRA     tamsulosin 0.4 MG capsule  Commonly known as: FLOMAX     therapeutic multivitamin-minerals tablet               Where to Get Your Medications        These medications were sent to [facility], [address]  [address] [facility], [address] [place] [id number]      Phone: [phone]   cefdinir 300 MG capsule     Pharmacy Instructions:    [address], [facility], [place], [facility]           Time Spent on discharge is  25 mins in patient examination, evaluation, counseling as well as medication reconciliation, prescriptions for required medications, discharge plan and follow up. Electronically signed by   Brooke Arnett MD  5/23/2023        Thank you Dr. Connor Whitt MD for the opportunity to be involved in this patient's care.

## 2023-05-25 LAB
ALBUMIN PERCENT: 53 % (ref 45–65)
ALBUMIN SERPL-MCNC: 2.4 G/DL (ref 3.2–5.2)
ALPHA 2 PERCENT: 15 % (ref 6–13)
ALPHA1 GLOB SERPL ELPH-MCNC: 0.3 G/DL (ref 0.1–0.4)
ALPHA1 GLOB SERPL ELPH-MCNC: 6 % (ref 3–6)
ALPHA2 GLOB SERPL ELPH-MCNC: 0.7 G/DL (ref 0.5–0.9)
B-GLOBULIN SERPL ELPH-MCNC: 0.6 G/DL (ref 0.5–1.1)
B-GLOBULIN SERPL ELPH-MCNC: 13 % (ref 11–19)
GAMMA GLOB SERPL ELPH-MCNC: 0.6 G/DL (ref 0.5–1.5)
GAMMA GLOBULIN %: 12 % (ref 9–20)
INTERPRETATION SERPL IFE-IMP: NORMAL
PATHOLOGIST: ABNORMAL
PATHOLOGIST: NORMAL
PROT PATTERN SERPL ELPH-IMP: ABNORMAL
PROT SERPL-MCNC: 4.5 G/DL (ref 6.4–8.3)
TOTAL PROT. SUM,%: 99 % (ref 98–102)
TOTAL PROT. SUM: 4.6 G/DL (ref 6.3–8.2)

## 2023-11-28 LAB
BUN / CREAT RATIO: NORMAL
BUN BLDV-MCNC: 52 MG/DL
CREAT SERPL-MCNC: 2.34 MG/DL

## 2023-12-11 ENCOUNTER — OFFICE VISIT (OUTPATIENT)
Age: 58
End: 2023-12-11
Payer: COMMERCIAL

## 2023-12-11 VITALS
WEIGHT: 154 LBS | BODY MASS INDEX: 26.29 KG/M2 | SYSTOLIC BLOOD PRESSURE: 155 MMHG | HEART RATE: 64 BPM | HEIGHT: 64 IN | DIASTOLIC BLOOD PRESSURE: 97 MMHG

## 2023-12-11 DIAGNOSIS — N13.30 BILATERAL HYDRONEPHROSIS: ICD-10-CM

## 2023-12-11 DIAGNOSIS — N13.8 BPH WITH URINARY OBSTRUCTION: ICD-10-CM

## 2023-12-11 DIAGNOSIS — N40.1 BPH WITH URINARY OBSTRUCTION: ICD-10-CM

## 2023-12-11 DIAGNOSIS — R33.9 URINARY RETENTION: Primary | ICD-10-CM

## 2023-12-11 PROBLEM — N21.0 BLADDER STONES: Status: ACTIVE | Noted: 2023-12-11

## 2023-12-11 PROBLEM — N20.0 RENAL CALCULUS, RIGHT: Status: ACTIVE | Noted: 2023-12-11

## 2023-12-11 PROCEDURE — G8427 DOCREV CUR MEDS BY ELIG CLIN: HCPCS | Performed by: SPECIALIST

## 2023-12-11 PROCEDURE — G8484 FLU IMMUNIZE NO ADMIN: HCPCS | Performed by: SPECIALIST

## 2023-12-11 PROCEDURE — 99244 OFF/OP CNSLTJ NEW/EST MOD 40: CPT | Performed by: SPECIALIST

## 2023-12-11 PROCEDURE — G8419 CALC BMI OUT NRM PARAM NOF/U: HCPCS | Performed by: SPECIALIST

## 2023-12-11 RX ORDER — AMLODIPINE BESYLATE 5 MG/1
1 TABLET ORAL
COMMUNITY

## 2023-12-11 RX ORDER — SILDENAFIL 50 MG/1
TABLET, FILM COATED ORAL
COMMUNITY
Start: 2022-12-09 | End: 2023-12-11

## 2023-12-11 RX ORDER — FLUDROCORTISONE ACETATE 0.1 MG/1
1 TABLET ORAL
COMMUNITY

## 2023-12-11 NOTE — PROGRESS NOTES
prostatomegaly. 3. Small stones or calcifications in the bladder in small right intrarenal  calculi. PAST MEDICAL, FAMILY AND SOCIAL HISTORY:  Past Medical History:   Diagnosis Date    BPH with obstruction/lower urinary tract symptoms     Chronic ear infection     Depression     Diverticulitis     Erectile dysfunction     Renal failure      Past Surgical History:   Procedure Laterality Date    APPENDECTOMY      CYSTOSCOPY N/A 1/1/2023    CYSTOSCOPY EVACUATION OF CLOTS, REGALADO CATHETER INSERTION.  performed by Bertha Pineda MD at [address] N/A 4/23/2023    CYSTOSCOPY WITH REGALADO PLACEMENT performed by Ryan Marsh MD at [address]      left     Family History   Problem Relation Age of Onset    High Blood Pressure Mother     Lung Cancer Mother     Stomach Cancer Father     Pancreatic Cancer Paternal Grandmother     Pancreatic Cancer Other      Social History     Tobacco Use   Smoking Status Some Days    Packs/day: 0.50    Years: 35.00    Additional pack years: 0.00    Total pack years: 17.50    Types: Cigarettes   Smokeless Tobacco Never      (If patient a smoker, smoking cessation counseling offered)   Social History     Substance and Sexual Activity   Alcohol Use Yes    Alcohol/week: 2.0 - 4.0 standard drinks of alcohol    Types: 2 - 4 Standard drinks or equivalent per week       ALLERGIES:  Influenza virus vaccine, Sulfa antibiotics, and Amoxicillin  MEDICATIONS:  Current Outpatient Medications   Medication Sig Dispense Refill    nicotine (NICODERM CQ) 7 MG/24HR Place 1 patch onto the skin every 24 hours      amLODIPine (NORVASC) 5 MG tablet Take 1 tablet by mouth Every Day      fludrocortisone (FLORINEF) 0.1 MG tablet Take 1 tablet by mouth Every Day      Multiple Vitamins-Minerals (THERAPEUTIC MULTIVITAMIN-MINERALS) tablet Take 1 tablet by mouth daily      fexofenadine (ALLEGRA ALLERGY) 180 MG tablet Take 1 tablet by mouth daily      sildenafil (VIAGRA) 50 MG tablet Take 1

## 2023-12-12 ENCOUNTER — TELEPHONE (OUTPATIENT)
Age: 58
End: 2023-12-12

## 2023-12-12 NOTE — TELEPHONE ENCOUNTER
PT called into office and said he would like a note from our office to donate Plasma. Pt stated he was denied due to him having a cath in place and would need a doctor note to donate, please advise

## 2023-12-14 ENCOUNTER — ANESTHESIA EVENT (OUTPATIENT)
Dept: OPERATING ROOM | Age: 58
End: 2023-12-14
Payer: COMMERCIAL

## 2023-12-15 ENCOUNTER — ANESTHESIA (OUTPATIENT)
Dept: OPERATING ROOM | Age: 58
End: 2023-12-15
Payer: COMMERCIAL

## 2023-12-15 ENCOUNTER — HOSPITAL ENCOUNTER (OUTPATIENT)
Age: 58
Setting detail: OUTPATIENT SURGERY
Discharge: HOME | End: 2023-12-15
Attending: SPECIALIST | Admitting: SPECIALIST
Payer: COMMERCIAL

## 2023-12-15 VITALS
OXYGEN SATURATION: 100 % | HEART RATE: 63 BPM | BODY MASS INDEX: 25.97 KG/M2 | DIASTOLIC BLOOD PRESSURE: 95 MMHG | RESPIRATION RATE: 16 BRPM | HEIGHT: 64 IN | SYSTOLIC BLOOD PRESSURE: 133 MMHG | TEMPERATURE: 97.3 F | WEIGHT: 152.12 LBS

## 2023-12-15 DIAGNOSIS — G89.18 POST-OP PAIN: Primary | ICD-10-CM

## 2023-12-15 PROCEDURE — 6360000002 HC RX W HCPCS: Performed by: NURSE ANESTHETIST, CERTIFIED REGISTERED

## 2023-12-15 PROCEDURE — 2500000003 HC RX 250 WO HCPCS: Performed by: NURSE ANESTHETIST, CERTIFIED REGISTERED

## 2023-12-15 PROCEDURE — 2580000003 HC RX 258: Performed by: ANESTHESIOLOGY

## 2023-12-15 PROCEDURE — 2500000003 HC RX 250 WO HCPCS: Performed by: SPECIALIST

## 2023-12-15 RX ORDER — LIDOCAINE HYDROCHLORIDE 10 MG/ML
INJECTION, SOLUTION INFILTRATION; PERINEURAL PRN
Status: DISCONTINUED | OUTPATIENT
Start: 2023-12-15 | End: 2023-12-15 | Stop reason: SDUPTHER

## 2023-12-15 RX ORDER — SODIUM CHLORIDE 0.9 % (FLUSH) 0.9 %
5-40 SYRINGE (ML) INJECTION EVERY 12 HOURS SCHEDULED
Status: DISCONTINUED | OUTPATIENT
Start: 2023-12-15 | End: 2023-12-15 | Stop reason: HOSPADM

## 2023-12-15 RX ORDER — EPHEDRINE SULFATE/0.9% NACL/PF 50 MG/5 ML
SYRINGE (ML) INTRAVENOUS PRN
Status: DISCONTINUED | OUTPATIENT
Start: 2023-12-15 | End: 2023-12-15 | Stop reason: SDUPTHER

## 2023-12-15 RX ORDER — HYDRALAZINE HYDROCHLORIDE 20 MG/ML
10 INJECTION INTRAMUSCULAR; INTRAVENOUS
Status: DISCONTINUED | OUTPATIENT
Start: 2023-12-15 | End: 2023-12-15 | Stop reason: HOSPADM

## 2023-12-15 RX ORDER — ONDANSETRON 2 MG/ML
INJECTION INTRAMUSCULAR; INTRAVENOUS PRN
Status: DISCONTINUED | OUTPATIENT
Start: 2023-12-15 | End: 2023-12-15 | Stop reason: SDUPTHER

## 2023-12-15 RX ORDER — CEFAZOLIN SODIUM 2 G/50ML
SOLUTION INTRAVENOUS PRN
Status: DISCONTINUED | OUTPATIENT
Start: 2023-12-15 | End: 2023-12-15 | Stop reason: SDUPTHER

## 2023-12-15 RX ORDER — SODIUM CHLORIDE 0.9 % (FLUSH) 0.9 %
5-40 SYRINGE (ML) INJECTION PRN
Status: DISCONTINUED | OUTPATIENT
Start: 2023-12-15 | End: 2023-12-15 | Stop reason: HOSPADM

## 2023-12-15 RX ORDER — TRAMADOL HYDROCHLORIDE 50 MG/1
50 TABLET ORAL EVERY 6 HOURS PRN
Qty: 20 TABLET | Refills: 0 | Status: SHIPPED | OUTPATIENT
Start: 2023-12-15 | End: 2023-12-20

## 2023-12-15 RX ORDER — PROCHLORPERAZINE EDISYLATE 5 MG/ML
5 INJECTION INTRAMUSCULAR; INTRAVENOUS
Status: DISCONTINUED | OUTPATIENT
Start: 2023-12-15 | End: 2023-12-15 | Stop reason: HOSPADM

## 2023-12-15 RX ORDER — PROPOFOL 10 MG/ML
INJECTION, EMULSION INTRAVENOUS PRN
Status: DISCONTINUED | OUTPATIENT
Start: 2023-12-15 | End: 2023-12-15 | Stop reason: SDUPTHER

## 2023-12-15 RX ORDER — MIDAZOLAM HYDROCHLORIDE 1 MG/ML
INJECTION INTRAMUSCULAR; INTRAVENOUS PRN
Status: DISCONTINUED | OUTPATIENT
Start: 2023-12-15 | End: 2023-12-15 | Stop reason: SDUPTHER

## 2023-12-15 RX ORDER — LABETALOL HYDROCHLORIDE 5 MG/ML
10 INJECTION, SOLUTION INTRAVENOUS
Status: DISCONTINUED | OUTPATIENT
Start: 2023-12-15 | End: 2023-12-15 | Stop reason: HOSPADM

## 2023-12-15 RX ORDER — FENTANYL CITRATE 50 UG/ML
INJECTION, SOLUTION INTRAMUSCULAR; INTRAVENOUS PRN
Status: DISCONTINUED | OUTPATIENT
Start: 2023-12-15 | End: 2023-12-15 | Stop reason: SDUPTHER

## 2023-12-15 RX ORDER — CEFADROXIL 500 MG/1
500 CAPSULE ORAL 2 TIMES DAILY
Qty: 10 CAPSULE | Refills: 0 | Status: SHIPPED | OUTPATIENT
Start: 2023-12-15

## 2023-12-15 RX ORDER — LIDOCAINE HYDROCHLORIDE 10 MG/ML
INJECTION, SOLUTION INFILTRATION; PERINEURAL PRN
Status: DISCONTINUED | OUTPATIENT
Start: 2023-12-15 | End: 2023-12-15 | Stop reason: ALTCHOICE

## 2023-12-15 RX ORDER — CEFAZOLIN 2 G/1
INJECTION, POWDER, FOR SOLUTION INTRAMUSCULAR; INTRAVENOUS
Status: DISCONTINUED
Start: 2023-12-15 | End: 2023-12-15 | Stop reason: HOSPADM

## 2023-12-15 RX ORDER — SODIUM CHLORIDE 9 MG/ML
INJECTION, SOLUTION INTRAVENOUS PRN
Status: DISCONTINUED | OUTPATIENT
Start: 2023-12-15 | End: 2023-12-15 | Stop reason: HOSPADM

## 2023-12-15 RX ORDER — SODIUM CHLORIDE, SODIUM LACTATE, POTASSIUM CHLORIDE, CALCIUM CHLORIDE 600; 310; 30; 20 MG/100ML; MG/100ML; MG/100ML; MG/100ML
INJECTION, SOLUTION INTRAVENOUS CONTINUOUS
Status: DISCONTINUED | OUTPATIENT
Start: 2023-12-15 | End: 2023-12-15 | Stop reason: HOSPADM

## 2023-12-15 RX ADMIN — CEFAZOLIN SODIUM 2000 MG: 2 SOLUTION INTRAVENOUS at 15:07

## 2023-12-15 RX ADMIN — LIDOCAINE HYDROCHLORIDE 40 MG: 10 INJECTION, SOLUTION INFILTRATION; PERINEURAL at 15:03

## 2023-12-15 RX ADMIN — FENTANYL CITRATE 50 MCG: 50 INJECTION, SOLUTION INTRAMUSCULAR; INTRAVENOUS at 15:21

## 2023-12-15 RX ADMIN — PROPOFOL 150 MG: 10 INJECTION, EMULSION INTRAVENOUS at 15:07

## 2023-12-15 RX ADMIN — Medication 10 MG: at 15:14

## 2023-12-15 RX ADMIN — SODIUM CHLORIDE, POTASSIUM CHLORIDE, SODIUM LACTATE AND CALCIUM CHLORIDE: 600; 310; 30; 20 INJECTION, SOLUTION INTRAVENOUS at 15:02

## 2023-12-15 RX ADMIN — ONDANSETRON 4 MG: 2 INJECTION INTRAMUSCULAR; INTRAVENOUS at 15:21

## 2023-12-15 RX ADMIN — FENTANYL CITRATE 50 MCG: 50 INJECTION, SOLUTION INTRAMUSCULAR; INTRAVENOUS at 15:19

## 2023-12-15 RX ADMIN — MIDAZOLAM 2 MG: 1 INJECTION INTRAMUSCULAR; INTRAVENOUS at 15:02

## 2023-12-15 ASSESSMENT — PAIN - FUNCTIONAL ASSESSMENT: PAIN_FUNCTIONAL_ASSESSMENT: NONE - DENIES PAIN

## 2023-12-15 NOTE — OP NOTE
Operative Note      Patient: Jim Arzola  YOB: 1965  MRN: [id number]    Date of Procedure: 12/15/2023    Pre-Op Diagnosis Codes:     * BPH with urinary obstruction [N40.1, N13.8]     * Incomplete bladder emptying [R33.9]    Post-Op Diagnosis: Same       Procedure(s):  CYSTOSCOPY PROSTATE ULTRASOUND AND VOLUME STUDIES  CYSTOSCOPY SUPRAPUBIC TUBE PLACEMENT    Surgeon(s):  Dusty Hernandez MD    Assistant:   * No surgical staff found *    Anesthesia: General    Estimated Blood Loss (mL): Minimal    Complications: None    Specimens:   * No specimens in log *    Implants:  * No implants in log *      Drains:   Urinary Catheter 12/15/23 Gar[place] (Active)   Collection Container Leg bag 12/15/23 1418   Securement Method Leg strap 12/15/23 1418   Status Clamped 12/15/23 1418       Findings: Prostate U/S volume = 44.73 mL     Detailed Description of Procedure:   INDICATIONS FOR THE PROCEDURE:  Jim Arzola is a 59 y.o. male with a history of chronic urinary retention. After treatment options were discussed including risks, benefits, alternatives, goals and possible complications,  the patient elected to proceed with today's procedure. Patient given Ancef 2 gm IV on call to OR. NARRATIVE SUMMARY OF THE PROCEDURE:  Transrectal Ultrasound Findings:  Prostate measurements: width = 50.7 cm, height = 33.1 cm, length = 50.9 cm  Prostate volume: 44.73 grams  Findings: no hypoechogenic areas suggestive of cancer seen  Prostate median lobe: unremarkable, absent  Seminal vesicle: normal  Bladder exam: normal     After informed consent was reviewed in the preoperative area the patient was taken back to the operating room and transferred to the operating table in the supine position. EPC cuffs were placed and the machine was turned on. Anesthesia was induced and the antibiotics were started. Patient was placed in modified dorsal lithotomy position, sterilely prepped and draped in a standard fashion.  We entered the urethra

## 2023-12-15 NOTE — DISCHARGE INSTRUCTIONS
Patient to keep Suprapubic (SP) catheter insertion site dressing clean and dry for 24 hours. OK to remove dressing after 24 hours, then keep insertion site clean with soap and water daily. Use over the counter topical neosporin antibacterial ointment around entrance site daily until first postoperative office visit. Place a 4x4 gauze dressing daily after cleaning site and applying neosporin. Call Rudy Song M.D.'s office to set up suture removal in 6 days AND Suprapubic (SP) catheter exchange in 4 weeks. Activity  You have had anesthesia today  Do not drive, operate heavy equipment, consume alcoholic beverages, or make any important decisions  for 24 hours   If you are taking pain medication: Do not drive or consume alcohol. Take your time changing positions today. You may feel light headed or dizzy if you move too quickly. Continue your home medications as ordered by your physician. Diet   You can eat your normal diet when you feel well. You should start off with bland foods like chicken soup, toast, or yogurt. Then advance as tolerated. Drink plenty of fluids (unless your doctor tells you not to). Your urine should be very lightly colored without a strong odor.

## 2023-12-15 NOTE — ANESTHESIA POSTPROCEDURE EVALUATION
Department of Anesthesiology  Postprocedure Note    Patient: Abbie Solorio  MRN: [id number]  YOB: 1965  Date of evaluation: 12/15/2023    Procedure Summary       Date: 12/15/23 Room / Location: [address]) [facility]    Anesthesia Start: [phone] Anesthesia Stop: 4288    Procedures:       CYSTOSCOPY PROSTATE ULTRASOUND AND VOLUME STUDIES      CYSTOSCOPY SUPRAPUBIC TUBE PLACEMENT Diagnosis:       BPH with urinary obstruction      Incomplete bladder emptying      (BPH with urinary obstruction [N40.1, N13.8])      (Incomplete bladder emptying [R33.9])    Surgeons: Kelly Teran MD Responsible Provider: Arnol Vasquez MD    Anesthesia Type: general ASA Status: 3            Anesthesia Type: No value filed. Amanda Phase I: Amanda Score: 5    Amanda Phase II:      Anesthesia Post Evaluation    Patient location during evaluation: PACU  Patient participation: complete - patient participated  Level of consciousness: awake and awake and alert  Pain score: 2  Nausea & Vomiting: no nausea and no vomiting  Cardiovascular status: hemodynamically stable and blood pressure returned to baseline  Respiratory status: acceptable  Hydration status: euvolemic  Multimodal analgesia pain management approach  Pain management: adequate    No notable events documented.

## 2023-12-15 NOTE — ANESTHESIA PRE PROCEDURE
Department of Anesthesiology  Preprocedure Note       Name:  Josephine Valera   Age:  59 y.o.  :  1965                                          MRN:  [id number]         Date:  12/15/2023      Surgeon: Ernst Lanes):  Bishop Pauline MD    Procedure: Procedure(s):  CYSTOSCOPY PROSTATE ULTRASOUND AND VOLUME STUDIES  CYSTOSCOPY SUPRAPUBIC TUBE PLACEMENT    Medications prior to admission:   Prior to Admission medications    Medication Sig Start Date End Date Taking? Authorizing Provider   amLODIPine (NORVASC) 5 MG tablet Take 1 tablet by mouth Every Day    Amari Pinto MD   nicotine (NICODERM CQ) 7 MG/24HR Place 1 patch onto the skin every 24 hours 23   Amari Pinto MD   fludrocortisone (FLORINEF) 0.1 MG tablet Take 1 tablet by mouth Every Day    Amari Pinto MD   Multiple Vitamins-Minerals (THERAPEUTIC MULTIVITAMIN-MINERALS) tablet Take 1 tablet by mouth daily    Amari Pinto MD   fexofenadine (ALLEGRA ALLERGY) 180 MG tablet Take 1 tablet by mouth daily    Amari Pinto MD   sildenafil (VIAGRA) 50 MG tablet Take 1 tablet by mouth as needed 23   Amari Pinto MD   gabapentin (NEURONTIN) 300 MG capsule Take 1 capsule by mouth 3 times daily.     Amari Pinto MD   tamsulosin (FLOMAX) 0.4 MG capsule Take 1 capsule by mouth daily 10/3/22   Amari Pinto MD       Current medications:    Current Facility-Administered Medications   Medication Dose Route Frequency Provider Last Rate Last Admin    lactated ringers IV soln infusion   IntraVENous Continuous Rene Garcia MD        sodium chloride flush 0.9 % injection 5-40 mL  5-40 mL IntraVENous 2 times per day Rene Garcia MD        sodium chloride flush 0.9 % injection 5-40 mL  5-40 mL IntraVENous PRN Rene Garcia MD        0.9 % sodium chloride infusion   IntraVENous PRN Rene Garcia MD        ceFAZolin (ANCEF) 2 g injection             famotidine (PEPCID) 20 mg in sodium chloride (PF) 0.9 % 10 mL injection  20

## 2024-01-10 ENCOUNTER — OFFICE VISIT (OUTPATIENT)
Age: 59
End: 2024-01-10
Payer: COMMERCIAL

## 2024-01-10 DIAGNOSIS — R33.9 URINARY RETENTION: Primary | ICD-10-CM

## 2024-01-10 DIAGNOSIS — N13.8 BPH WITH URINARY OBSTRUCTION: ICD-10-CM

## 2024-01-10 DIAGNOSIS — N40.1 BPH WITH URINARY OBSTRUCTION: ICD-10-CM

## 2024-01-10 DIAGNOSIS — N13.30 BILATERAL HYDRONEPHROSIS: ICD-10-CM

## 2024-01-10 PROCEDURE — G8484 FLU IMMUNIZE NO ADMIN: HCPCS | Performed by: SPECIALIST

## 2024-01-10 PROCEDURE — 3017F COLORECTAL CA SCREEN DOC REV: CPT | Performed by: SPECIALIST

## 2024-01-10 PROCEDURE — G8419 CALC BMI OUT NRM PARAM NOF/U: HCPCS | Performed by: SPECIALIST

## 2024-01-10 PROCEDURE — 4004F PT TOBACCO SCREEN RCVD TLK: CPT | Performed by: SPECIALIST

## 2024-01-10 PROCEDURE — G8427 DOCREV CUR MEDS BY ELIG CLIN: HCPCS | Performed by: SPECIALIST

## 2024-01-10 PROCEDURE — 99212 OFFICE O/P EST SF 10 MIN: CPT | Performed by: SPECIALIST

## 2024-01-10 NOTE — PROGRESS NOTES
Sourav Medina MD [facility] Urology Office Progress Note    Patient:  Heriberto Busby  YOB: 1965  Date: 1/10/2024    HISTORY OF PRESENT ILLNESS:   The patient is a 58 y.o. male  Patient here to have his Suprapubic (SP) catheter sutures removed.    Summary of old records:   Chronic urinary retention, chronic indwelling henderosn catheter, can't do ISC, wants Suprapubic (SP) catheter so he can be sexually active; 12/15/23 Suprapubic (SP) catheter  BPH: Flomax/tamsulosin 0.4 mg po qd   Bilateral hydronephrosis and distended bladder to umbilicus on 5/17/23 CT, persistent (chronic) b/l hydro on 11/16/23 renal US    Additional History: none    Procedures Today: N/A    Urinalysis today:  No results found for this visit on 01/10/24.    Last several PSA's:  Lab Results   Component Value Date    PSA 2.95 02/09/2021    PSA 3.99 12/16/2019       Last total testosterone:  No results found for: \"TESTOSTERONE\"    Last BUN and creatinine:  Lab Results   Component Value Date    BUN 52 (H) 11/28/2023     Lab Results   Component Value Date    CREATININE 2.34 (H) 11/28/2023       Last CBC:  Lab Results   Component Value Date    WBC 14.6 (H) 05/20/2023    HGB 11.6 (L) 05/20/2023    HCT 35.3 (L) 05/20/2023    MCV 88.9 05/20/2023     (L) 05/20/2023       Additional Lab/Culture results: none    Imaging Reviewed during this Office Visit: none  (results were independently reviewed by physician and radiology report verified)    Physical Exam:    There were no vitals filed for this visit.  There is no height or weight on file to calculate BMI.  Patient is a 58 y.o. male in no acute distress and alert and oriented to person, place and time.Suprapubic (SP) catheter site healing nicely    Assessment and Plan      1. Urinary retention    2. BPH with urinary obstruction    3. Bilateral hydronephrosis           Plan:      Return in about 1 week (around 1/17/2024) for SP tube change.  Patient here to have his

## 2024-01-17 ENCOUNTER — OFFICE VISIT (OUTPATIENT)
Age: 59
End: 2024-01-17
Payer: COMMERCIAL

## 2024-01-17 DIAGNOSIS — N13.8 BPH WITH URINARY OBSTRUCTION: ICD-10-CM

## 2024-01-17 DIAGNOSIS — R33.9 URINARY RETENTION: Primary | ICD-10-CM

## 2024-01-17 DIAGNOSIS — N13.30 BILATERAL HYDRONEPHROSIS: ICD-10-CM

## 2024-01-17 DIAGNOSIS — N40.1 BPH WITH URINARY OBSTRUCTION: ICD-10-CM

## 2024-01-17 PROCEDURE — 51705 CHANGE OF BLADDER TUBE: CPT | Performed by: SPECIALIST

## 2024-01-17 NOTE — PROGRESS NOTES
Sourav Medina MD [facility] Urology Office Progress Note    Patient:  Heriberto Busby  YOB: 1965  Date: 1/17/2024    HISTORY OF PRESENT ILLNESS:   The patient is a 58 y.o. male  Chronic Suprapubic Catheter   Patient presents today with a history of a chronic suprapubic catheter for several weeks.  The etiology is felt to be due to: Retention.  Overall, the problem is unchanged.    Procedure Note (1/17/24):  The patient's suprapubic catheter was removed.  After cleaning him off with Hibiclens and sterile water, patient was upsized to an 18 Japanese SP catheter, it was inserted into the bladder and the catheter hooked up to a drainage bag.  07 mL of sterile water was used to fill up the balloon.  The patient tolerated the procedure well.    Summary of old records:  Chronic urinary retention, chronic indwelling henderson catheter, can't do ISC, wants Suprapubic (SP) catheter so he can be sexually active; 12/15/23 Suprapubic (SP) catheter  BPH: Flomax/tamsulosin 0.4 mg po qd   Bilateral hydronephrosis and distended bladder to umbilicus on 5/17/23 CT, persistent (chronic) b/l hydro on 11/16/23 renal US    Additional History: none    Procedures Today: see above    Urinalysis today:  No results found for this visit on 01/17/24.    Last several PSA's:  Lab Results   Component Value Date    PSA 2.95 02/09/2021    PSA 3.99 12/16/2019       Last total testosterone:  No results found for: \"TESTOSTERONE\"    Last BUN and creatinine:  Lab Results   Component Value Date    BUN 52 (H) 11/28/2023     Lab Results   Component Value Date    CREATININE 2.34 (H) 11/28/2023       Last CBC:  Lab Results   Component Value Date    WBC 14.6 (H) 05/20/2023    HGB 11.6 (L) 05/20/2023    HCT 35.3 (L) 05/20/2023    MCV 88.9 05/20/2023     (L) 05/20/2023       Additional Lab/Culture results: none    Imaging Reviewed during this Office Visit: none  (results were independently reviewed by physician and radiology

## 2024-02-14 ENCOUNTER — OFFICE VISIT (OUTPATIENT)
Age: 59
End: 2024-02-14
Payer: COMMERCIAL

## 2024-02-14 DIAGNOSIS — N13.8 BPH WITH URINARY OBSTRUCTION: ICD-10-CM

## 2024-02-14 DIAGNOSIS — R33.9 URINARY RETENTION: Primary | ICD-10-CM

## 2024-02-14 DIAGNOSIS — N40.1 BPH WITH URINARY OBSTRUCTION: ICD-10-CM

## 2024-02-14 DIAGNOSIS — N13.30 BILATERAL HYDRONEPHROSIS: ICD-10-CM

## 2024-02-14 PROCEDURE — 17250 CHEM CAUT OF GRANLTJ TISSUE: CPT | Performed by: SPECIALIST

## 2024-02-14 PROCEDURE — 51705 CHANGE OF BLADDER TUBE: CPT | Performed by: SPECIALIST

## 2024-02-14 NOTE — PROGRESS NOTES
Sourav Medina MD [facility] Urology Office Progress Note    Patient:  Heriberto Busby  YOB: 1965  Date: 2/14/2024    HISTORY OF PRESENT ILLNESS:   The patient is a 58 y.o. male  Chronic Suprapubic Catheter   Patient presents today with a history of a chronic suprapubic catheter for several weeks.  The etiology is felt to be due to: Retention.  Overall, the problem is unchanged.     Procedure Note (1/17/24):  The patient's suprapubic catheter was removed.  After cleaning him off with Hibiclens and sterile water, patient was upsized to an 20 Hungarian SP catheter, it was inserted into the bladder and the catheter hooked up to a drainage bag.  07 mL of sterile water was used to fill up the balloon.  The patient tolerated the procedure well. Silver nitrate applied to Suprapubic (SP) catheter exit site granulation tissue.    Summary of old records:   Chronic urinary retention, chronic indwelling henderson catheter, can't do ISC, wants Suprapubic (SP) catheter so he can be sexually active; 12/15/23 Suprapubic (SP) catheter  BPH: Flomax/tamsulosin 0.4 mg po qd   Bilateral hydronephrosis and distended bladder to umbilicus on 5/17/23 CT, persistent (chronic) b/l hydro on 11/16/23 renal US    Additional History: none    Procedures Today: see above    Urinalysis today:  No results found for this visit on 02/14/24.    Last several PSA's:  Lab Results   Component Value Date    PSA 2.95 02/09/2021    PSA 3.99 12/16/2019       Last total testosterone:  No results found for: \"TESTOSTERONE\"    Last BUN and creatinine:  Lab Results   Component Value Date    BUN 52 (H) 11/28/2023     Lab Results   Component Value Date    CREATININE 2.34 (H) 11/28/2023       Last CBC:  Lab Results   Component Value Date    WBC 14.6 (H) 05/20/2023    HGB 11.6 (L) 05/20/2023    HCT 35.3 (L) 05/20/2023    MCV 88.9 05/20/2023     (L) 05/20/2023       Additional Lab/Culture results: none    Imaging Reviewed during this

## 2024-05-06 ENCOUNTER — APPOINTMENT (OUTPATIENT)
Dept: GENERAL RADIOLOGY | Age: 59
End: 2024-05-06
Payer: COMMERCIAL

## 2024-05-06 ENCOUNTER — APPOINTMENT (OUTPATIENT)
Dept: CT IMAGING | Age: 59
End: 2024-05-06
Payer: COMMERCIAL

## 2024-05-06 ENCOUNTER — HOSPITAL ENCOUNTER (EMERGENCY)
Age: 59
Discharge: HOME | End: 2024-05-06
Attending: EMERGENCY MEDICINE
Payer: COMMERCIAL

## 2024-05-06 VITALS
SYSTOLIC BLOOD PRESSURE: 152 MMHG | OXYGEN SATURATION: 100 % | BODY MASS INDEX: 25.73 KG/M2 | HEART RATE: 67 BPM | WEIGHT: 150 LBS | RESPIRATION RATE: 16 BRPM | TEMPERATURE: 98.8 F | DIASTOLIC BLOOD PRESSURE: 92 MMHG

## 2024-05-06 DIAGNOSIS — S80.01XA CONTUSION OF RIGHT KNEE, INITIAL ENCOUNTER: ICD-10-CM

## 2024-05-06 DIAGNOSIS — S40.011A CONTUSION OF RIGHT SHOULDER, INITIAL ENCOUNTER: ICD-10-CM
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
## 2024-05-06 PROCEDURE — 73562 X-RAY EXAM OF KNEE 3: CPT

## 2024-05-06 PROCEDURE — 72040 X-RAY EXAM NECK SPINE 2-3 VW: CPT

## 2024-05-06 PROCEDURE — 99284 EMERGENCY DEPT VISIT MOD MDM: CPT

## 2024-05-06 PROCEDURE — 73700 CT LOWER EXTREMITY W/O DYE: CPT

## 2024-05-06 PROCEDURE — 73030 X-RAY EXAM OF SHOULDER: CPT

## 2024-05-06 RX ORDER — TRAMADOL HYDROCHLORIDE 50 MG/1
50 TABLET ORAL EVERY 4 HOURS PRN
Qty: 18 TABLET | Refills: 0 | Status: SHIPPED | OUTPATIENT
Start: 2024-05-06 | End: 2024-05-09

## 2024-05-06 ASSESSMENT — PAIN SCALES - GENERAL: PAINLEVEL_OUTOF10: 5

## 2024-05-06 ASSESSMENT — PAIN DESCRIPTION - ORIENTATION: ORIENTATION: RIGHT

## 2024-05-06 ASSESSMENT — PAIN DESCRIPTION - LOCATION: LOCATION: LEG;NECK;SHOULDER

## 2024-05-06 ASSESSMENT — PAIN DESCRIPTION - FREQUENCY: FREQUENCY: CONTINUOUS

## 2024-05-06 ASSESSMENT — PAIN DESCRIPTION - DESCRIPTORS: DESCRIPTORS: SORE

## 2024-05-06 ASSESSMENT — PAIN - FUNCTIONAL ASSESSMENT: PAIN_FUNCTIONAL_ASSESSMENT: 0-10
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
## 2024-05-09 ENCOUNTER — OFFICE VISIT (OUTPATIENT)
Age: 59
End: 2024-05-09

## 2024-05-09 VITALS — WEIGHT: 150 LBS | BODY MASS INDEX: 25.61 KG/M2 | HEIGHT: 64 IN

## 2024-05-09 DIAGNOSIS — N40.1 BPH WITH URINARY OBSTRUCTION: ICD-10-CM

## 2024-05-09 DIAGNOSIS — N13.30 BILATERAL HYDRONEPHROSIS: ICD-10-CM

## 2024-05-09 DIAGNOSIS — R33.9 URINARY RETENTION: Primary | ICD-10-CM

## 2024-05-09 DIAGNOSIS — N13.8 BPH WITH URINARY OBSTRUCTION: ICD-10-CM

## 2024-05-09 NOTE — PROGRESS NOTES
Results   Component Value Date    CREATININE 2.34 (H) 11/28/2023       Last CBC:  Lab Results   Component Value Date    WBC 14.6 (H) 05/20/2023    HGB 11.6 (L) 05/20/2023    HCT 35.3 (L) 05/20/2023    MCV 88.9 05/20/2023     (L) 05/20/2023       Additional Lab/Culture results: none    Imaging Reviewed during this Office Visit: none  (results were independently reviewed by physician and radiology report verified)    Physical Exam:    There were no vitals filed for this visit.  Body mass index is 25.75 kg/m².  Patient is a 58 y.o. male in no acute distress and alert and oriented to person, place and time.    Assessment and Plan   Assessment   1. Urinary retention    2. BPH with urinary obstruction    3. Bilateral hydronephrosis            Plan    Return in about 4 weeks (around 6/6/2024) for 4 wk S/P cath change.  Patient reminded the importance of monthly OV for his Suprapubic (SP) catheter changes in order to avoid encrustation, UTI's, sepsis.  He was also told to keep the Suprapubic (SP) catheter open to gravity drainage instead of plugging the catheter.  Silver nitrate applied to Suprapubic (SP) catheter exit site granulation tissue.  Patient no longer taking Flomax/tamsulosin 0.4 mg po qd for BPH symptoms.              Sourav Medina MD FACS  5/9/2024 10:14 AM    2024 Quality Measure Documentation: N/A  Social History     Tobacco Use   Smoking Status Some Days    Current packs/day: 0.50    Average packs/day: 0.5 packs/day for 35.0 years (17.5 ttl pk-yrs)    Types: Cigarettes   Smokeless Tobacco Never     (If patient a smoker, smoking cessation counseling offered)

## 2024-06-13 ENCOUNTER — OFFICE VISIT (OUTPATIENT)
Age: 59
End: 2024-06-13

## 2024-06-13 VITALS — BODY MASS INDEX: 25.61 KG/M2 | HEIGHT: 64 IN | WEIGHT: 150 LBS

## 2024-06-13 DIAGNOSIS — N40.1 BPH WITH URINARY OBSTRUCTION: ICD-10-CM

## 2024-06-13 DIAGNOSIS — R33.9 URINARY RETENTION: Primary | ICD-10-CM

## 2024-06-13 DIAGNOSIS — N13.8 BPH WITH URINARY OBSTRUCTION: ICD-10-CM

## 2024-06-13 DIAGNOSIS — N13.30 BILATERAL HYDRONEPHROSIS: ICD-10-CM

## 2024-06-13 RX ORDER — METHOCARBAMOL 750 MG/1
TABLET, FILM COATED ORAL
COMMUNITY
Start: 2024-05-12

## 2024-06-13 RX ORDER — HYDROXYZINE PAMOATE 25 MG/1
CAPSULE ORAL
COMMUNITY
Start: 2024-05-12

## 2024-06-13 RX ORDER — FLUTICASONE PROPIONATE 50 MCG
SPRAY, SUSPENSION (ML) NASAL
COMMUNITY
Start: 2024-05-30

## 2024-06-13 RX ORDER — AZELASTINE HYDROCHLORIDE 137 UG/1
SPRAY, METERED NASAL
COMMUNITY
Start: 2024-05-13

## 2024-06-13 RX ORDER — CLONIDINE HYDROCHLORIDE 0.1 MG/1
TABLET ORAL
COMMUNITY
Start: 2024-05-12

## 2024-06-13 RX ORDER — TRAZODONE HYDROCHLORIDE 100 MG/1
TABLET ORAL
COMMUNITY
Start: 2024-05-15

## 2024-06-13 RX ORDER — IBUPROFEN 600 MG/1
TABLET ORAL
COMMUNITY
Start: 2024-05-12

## 2024-06-13 RX ORDER — DOXEPIN HYDROCHLORIDE 25 MG/1
CAPSULE ORAL
COMMUNITY
Start: 2024-05-12

## 2024-06-13 RX ORDER — ACETAMINOPHEN 325 MG/1
TABLET ORAL
COMMUNITY
Start: 2024-05-30

## 2024-06-13 NOTE — PROGRESS NOTES
report verified)    Physical Exam:    There were no vitals filed for this visit.  Body mass index is 25.75 kg/m².  Patient is a 58 y.o. male in no acute distress and alert and oriented to person, place and time.    Assessment and Plan   Assessment   1. Urinary retention    2. BPH with urinary obstruction    3. Bilateral hydronephrosis            Plan    Return in about 4 weeks (around 7/11/2024) for 4 wk S/P cath change.           Sourav Medina MD Located within [facility]  6/13/2024 9:03 AM    2024 Quality Measure Documentation: N/A  Social History     Tobacco Use   Smoking Status Some Days    Current packs/day: 0.50    Average packs/day: 0.5 packs/day for 35.0 years (17.5 ttl pk-yrs)    Types: Cigarettes   Smokeless Tobacco Never     (If patient a smoker, smoking cessation counseling offered)

## 2024-07-29 ENCOUNTER — OFFICE VISIT (OUTPATIENT)
Age: 59
End: 2024-07-29
Payer: COMMERCIAL

## 2024-07-29 VITALS — WEIGHT: 150 LBS | BODY MASS INDEX: 25.61 KG/M2 | HEIGHT: 64 IN

## 2024-07-29 DIAGNOSIS — N13.8 BPH WITH URINARY OBSTRUCTION: ICD-10-CM

## 2024-07-29 DIAGNOSIS — N13.30 BILATERAL HYDRONEPHROSIS: ICD-10-CM

## 2024-07-29 DIAGNOSIS — R33.9 URINARY RETENTION: ICD-10-CM

## 2024-07-29 DIAGNOSIS — N45.1 LEFT EPIDIDYMITIS: Primary | ICD-10-CM

## 2024-07-29 DIAGNOSIS — N40.1 BPH WITH URINARY OBSTRUCTION: ICD-10-CM

## 2024-07-29 PROCEDURE — G8419 CALC BMI OUT NRM PARAM NOF/U: HCPCS | Performed by: SPECIALIST

## 2024-07-29 PROCEDURE — 51705 CHANGE OF BLADDER TUBE: CPT | Performed by: SPECIALIST

## 2024-07-29 PROCEDURE — 99214 OFFICE O/P EST MOD 30 MIN: CPT | Performed by: SPECIALIST

## 2024-07-29 PROCEDURE — G8427 DOCREV CUR MEDS BY ELIG CLIN: HCPCS | Performed by: SPECIALIST

## 2024-07-29 PROCEDURE — 3017F COLORECTAL CA SCREEN DOC REV: CPT | Performed by: SPECIALIST

## 2024-07-29 PROCEDURE — 4004F PT TOBACCO SCREEN RCVD TLK: CPT | Performed by: SPECIALIST

## 2024-07-29 RX ORDER — CIPROFLOXACIN 250 MG/1
250 TABLET, FILM COATED ORAL 2 TIMES DAILY
Qty: 60 TABLET | Refills: 0 | Status: SHIPPED | OUTPATIENT
Start: 2024-07-29 | End: 2024-07-30

## 2024-07-29 NOTE — PROGRESS NOTES
(L) 05/20/2023    HCT 35.3 (L) 05/20/2023    MCV 88.9 05/20/2023     (L) 05/20/2023       Additional Lab/Culture results: none    Imaging Reviewed during this Office Visit: none  (results were independently reviewed by physician and radiology report verified)    Physical Exam:    There were no vitals filed for this visit.  Body mass index is 25.75 kg/m².  Patient is a 58 y.o. male in no acute distress and alert and oriented to person, place and time.  Left testicle and epididymis are tender and swollen     Assessment and Plan   Assessment   1. Left epididymitis    2. Urinary retention    3. BPH with urinary obstruction    4. Bilateral hydronephrosis            Plan    Return in about 4 weeks (around 8/26/2024) for 4 wk S/P cath change.  Patient has been non-compliant with follow up for timely Suprapubic (SP) catheter changes.  Patient now has Left epididymitis on PE today.  Will begin Cipro 250 mg po bid for one month for treatment of his Left epididymitis and likely bacteruria.         Sourav Medina MD FACS  7/29/2024 4:23 PM    2024 Quality Measure Documentation: N/A  Social History     Tobacco Use   Smoking Status Some Days    Current packs/day: 0.50    Average packs/day: 0.5 packs/day for 35.0 years (17.5 ttl pk-yrs)    Types: Cigarettes   Smokeless Tobacco Never     (If patient a smoker, smoking cessation counseling offered)

## 2024-07-30 ENCOUNTER — TELEPHONE (OUTPATIENT)
Age: 59
End: 2024-07-30

## 2024-07-30 ENCOUNTER — HOSPITAL ENCOUNTER (EMERGENCY)
Age: 59
Discharge: HOME | End: 2024-07-30
Attending: EMERGENCY MEDICINE
Payer: COMMERCIAL

## 2024-07-30 ENCOUNTER — APPOINTMENT (OUTPATIENT)
Dept: ULTRASOUND IMAGING | Age: 59
End: 2024-07-30
Payer: COMMERCIAL

## 2024-07-30 VITALS
OXYGEN SATURATION: 97 % | TEMPERATURE: 98.3 F | RESPIRATION RATE: 18 BRPM | SYSTOLIC BLOOD PRESSURE: 146 MMHG | HEART RATE: 87 BPM | DIASTOLIC BLOOD PRESSURE: 71 MMHG

## 2024-07-30 DIAGNOSIS — N45.1 EPIDIDYMITIS: Primary | ICD-10-CM

## 2024-07-30 LAB
ANION GAP SERPL CALCULATED.3IONS-SCNC: 15 MMOL/L (ref 9–17)
BACTERIA URNS QL MICRO: ABNORMAL
BASOPHILS # BLD: 0.06 K/UL (ref 0–0.2)
BASOPHILS NFR BLD: 0 % (ref 0–2)
BILIRUB UR QL STRIP: NEGATIVE
BUN SERPL-MCNC: 27 MG/DL (ref 8–23)
BUN/CREAT SERPL: 19 (ref 9–20)
CALCIUM SERPL-MCNC: 8.9 MG/DL (ref 8.6–10.4)
CHLORIDE SERPL-SCNC: 100 MMOL/L (ref 98–107)
CLARITY UR: ABNORMAL
CO2 SERPL-SCNC: 18 MMOL/L (ref 20–31)
COLOR UR: YELLOW
CREAT SERPL-MCNC: 1.4 MG/DL (ref 0.7–1.2)
EOSINOPHIL # BLD: 0.1 K/UL (ref 0–0.44)
EOSINOPHILS RELATIVE PERCENT: 1 % (ref 1–4)
EPI CELLS #/AREA URNS HPF: ABNORMAL /HPF (ref 0–5)
ERYTHROCYTE [DISTWIDTH] IN BLOOD BY AUTOMATED COUNT: 13.2 % (ref 11.8–14.4)
GFR, ESTIMATED: 58 ML/MIN/1.73M2
GLUCOSE SERPL-MCNC: 101 MG/DL (ref 70–99)
GLUCOSE UR STRIP-MCNC: NEGATIVE MG/DL
HCT VFR BLD AUTO: 47.7 % (ref 40.7–50.3)
HGB BLD-MCNC: 15 G/DL (ref 13–17)
HGB UR QL STRIP.AUTO: ABNORMAL
IMM GRANULOCYTES # BLD AUTO: 0.14 K/UL (ref 0–0.3)
IMM GRANULOCYTES NFR BLD: 1 %
KETONES UR STRIP-MCNC: NEGATIVE MG/DL
LEUKOCYTE ESTERASE UR QL STRIP: ABNORMAL
LYMPHOCYTES NFR BLD: 1.27 K/UL (ref 1.1–3.7)
LYMPHOCYTES RELATIVE PERCENT: 8 % (ref 24–43)
MCH RBC QN AUTO: 30.7 PG (ref 25.2–33.5)
MCHC RBC AUTO-ENTMCNC: 31.4 G/DL (ref 28.4–34.8)
MCV RBC AUTO: 97.5 FL (ref 82.6–102.9)
MONOCYTES NFR BLD: 1.01 K/UL (ref 0.1–1.2)
MONOCYTES NFR BLD: 6 % (ref 3–12)
NEUTROPHILS NFR BLD: 84 % (ref 36–65)
NEUTS SEG NFR BLD: 13.54 K/UL (ref 1.5–8.1)
NITRITE UR QL STRIP: POSITIVE
NRBC BLD-RTO: 0 PER 100 WBC
PH UR STRIP: 6 [PH] (ref 5–8)
PLATELET # BLD AUTO: 267 K/UL (ref 138–453)
PMV BLD AUTO: 8.5 FL (ref 8.1–13.5)
POTASSIUM SERPL-SCNC: 4.2 MMOL/L (ref 3.7–5.3)
PROT UR STRIP-MCNC: ABNORMAL MG/DL
RBC # BLD AUTO: 4.89 M/UL (ref 4.21–5.77)
RBC #/AREA URNS HPF: ABNORMAL /HPF (ref 0–2)
SODIUM SERPL-SCNC: 133 MMOL/L (ref 135–144)
SP GR UR STRIP: 1.01 (ref 1–1.03)
UROBILINOGEN UR STRIP-ACNC: ABNORMAL EU/DL (ref 0–1)
WBC #/AREA URNS HPF: ABNORMAL /HPF (ref 0–5)
WBC OTHER # BLD: 16.1 K/UL (ref 3.5–11.3)

## 2024-07-30 PROCEDURE — 76870 US EXAM SCROTUM: CPT

## 2024-07-30 PROCEDURE — 99284 EMERGENCY DEPT VISIT MOD MDM: CPT

## 2024-07-30 PROCEDURE — 96375 TX/PRO/DX INJ NEW DRUG ADDON: CPT

## 2024-07-30 PROCEDURE — 80048 BASIC METABOLIC PNL TOTAL CA: CPT

## 2024-07-30 PROCEDURE — 6360000002 HC RX W HCPCS: Performed by: PHYSICIAN ASSISTANT

## 2024-07-30 PROCEDURE — 81001 URINALYSIS AUTO W/SCOPE: CPT

## 2024-07-30 PROCEDURE — 85025 COMPLETE CBC W/AUTO DIFF WBC: CPT

## 2024-07-30 PROCEDURE — 2580000003 HC RX 258: Performed by: PHYSICIAN ASSISTANT

## 2024-07-30 PROCEDURE — 96365 THER/PROPH/DIAG IV INF INIT: CPT

## 2024-07-30 RX ORDER — MORPHINE SULFATE 4 MG/ML
4 INJECTION, SOLUTION INTRAMUSCULAR; INTRAVENOUS ONCE
Status: COMPLETED | OUTPATIENT
Start: 2024-07-30 | End: 2024-07-30

## 2024-07-30 RX ORDER — HYDROCORTISONE 25 MG/G
CREAM TOPICAL
Qty: 28 G | Refills: 0 | Status: SHIPPED | OUTPATIENT
Start: 2024-07-30

## 2024-07-30 RX ORDER — CIPROFLOXACIN 250 MG/1
250 TABLET, FILM COATED ORAL 2 TIMES DAILY
Qty: 60 TABLET | Refills: 0 | Status: SHIPPED | OUTPATIENT
Start: 2024-07-30

## 2024-07-30 RX ORDER — CIPROFLOXACIN 2 MG/ML
400 INJECTION, SOLUTION INTRAVENOUS ONCE
Status: COMPLETED | OUTPATIENT
Start: 2024-07-30 | End: 2024-07-30

## 2024-07-30 RX ORDER — ONDANSETRON 2 MG/ML
4 INJECTION INTRAMUSCULAR; INTRAVENOUS ONCE
Status: COMPLETED | OUTPATIENT
Start: 2024-07-30 | End: 2024-07-30

## 2024-07-30 RX ORDER — 0.9 % SODIUM CHLORIDE 0.9 %
1000 INTRAVENOUS SOLUTION INTRAVENOUS ONCE
Status: COMPLETED | OUTPATIENT
Start: 2024-07-30 | End: 2024-07-30

## 2024-07-30 RX ORDER — NAPROXEN 500 MG/1
500 TABLET ORAL 2 TIMES DAILY WITH MEALS
Qty: 20 TABLET | Refills: 0 | Status: SHIPPED | OUTPATIENT
Start: 2024-07-30

## 2024-07-30 RX ORDER — KETOROLAC TROMETHAMINE 30 MG/ML
30 INJECTION, SOLUTION INTRAMUSCULAR; INTRAVENOUS ONCE
Status: COMPLETED | OUTPATIENT
Start: 2024-07-30 | End: 2024-07-30

## 2024-07-30 RX ADMIN — ONDANSETRON 4 MG: 2 INJECTION INTRAMUSCULAR; INTRAVENOUS at 16:30

## 2024-07-30 RX ADMIN — SODIUM CHLORIDE 1000 ML: 9 INJECTION, SOLUTION INTRAVENOUS at 16:31

## 2024-07-30 RX ADMIN — MORPHINE SULFATE 4 MG: 4 INJECTION, SOLUTION INTRAMUSCULAR; INTRAVENOUS at 16:30

## 2024-07-30 RX ADMIN — CIPROFLOXACIN 400 MG: 2 INJECTION, SOLUTION INTRAVENOUS at 17:48

## 2024-07-30 RX ADMIN — KETOROLAC TROMETHAMINE 30 MG: 30 INJECTION, SOLUTION INTRAMUSCULAR at 17:48

## 2024-07-30 ASSESSMENT — PAIN SCALES - GENERAL: PAINLEVEL_OUTOF10: 10

## 2024-07-30 ASSESSMENT — PAIN - FUNCTIONAL ASSESSMENT: PAIN_FUNCTIONAL_ASSESSMENT: 0-10

## 2024-07-30 ASSESSMENT — PAIN DESCRIPTION - FREQUENCY: FREQUENCY: CONTINUOUS

## 2024-07-30 NOTE — DISCHARGE INSTRUCTIONS
Take meds as prescribed.  Follow up with doctor  in morning.   Return to ER immediately if symptoms worsen or persist.

## 2024-07-30 NOTE — ED NOTES
Discharge instructions reviewed with patient. Encouraged to keep all previously scheduled appts. Encouraged to  prescribed Cipro, coupon provided. VSS. Verbalized understanding.

## 2024-08-01 NOTE — ED PROVIDER NOTES
CAPSULE        METHOCARBAMOL (ROBAXIN) 750 MG TABLET        MULTIPLE VITAMINS-MINERALS (THERAPEUTIC MULTIVITAMIN-MINERALS) TABLET    Take 1 tablet by mouth daily    NICOTINE (NICODERM CQ) 7 MG/24HR    Place 1 patch onto the skin every 24 hours    SILDENAFIL (VIAGRA) 50 MG TABLET    Take 1 tablet by mouth as needed    TRAZODONE (DESYREL) 100 MG TABLET           PAST MEDICAL HISTORY         Diagnosis Date    BPH with obstruction/lower urinary tract symptoms     Chronic ear infection     Depression     Diverticulitis     Erectile dysfunction     Renal failure        SURGICAL HISTORY           Procedure Laterality Date    APPENDECTOMY      BLADDER SURGERY N/A 12/15/2023    CYSTOSCOPY PROSTATE ULTRASOUND AND VOLUME STUDIES performed by Sourav Medina MD at [facility] OR    CYSTOSCOPY N/A 2023    CYSTOSCOPY EVACUATION OF CLOTS, REGALADO CATHETER INSERTION. performed by Ollie Shane Jr., MD at [facility] OR    CYSTOSCOPY N/A 2023    CYSTOSCOPY WITH REGALADO PLACEMENT performed by Eduard Freeman MD at [facility] OR    CYSTOSCOPY N/A 12/15/2023    CYSTOSCOPY SUPRAPUBIC TUBE PLACEMENT performed by Sourav Medina MD at [facility] OR    KNEE SURGERY      left         HISTORY           Problem Relation Age of Onset    High Blood Pressure Mother     Lung Cancer Mother     Stomach Cancer Father     Pancreatic Cancer Paternal Grandmother     Pancreatic Cancer Other      Family Status   Relation Name Status    Mother      Father      PGM      Other uncle         SOCIAL HISTORY      reports that he has been smoking cigarettes. He has a 17.5 pack-year smoking history. He has never used smokeless tobacco. He reports current alcohol use of about 2.0 - 4.0 standard drinks of alcohol per week. He reports that he does not use drugs.    REVIEW OFSYSTEMS    (2-9 systems for level 4, 10 or more for level 5)   Review of Systems    Except as noted above the remainder of the review of systems 
tablet by mouth dailyHistorical Med      fexofenadine (ALLEGRA ALLERGY) 180 MG tablet Take 1 tablet by mouth dailyHistorical Med      sildenafil (VIAGRA) 50 MG tablet Take 1 tablet by mouth as neededHistorical Med      gabapentin (NEURONTIN) 300 MG capsule Take 1 capsule by mouth 3 times daily.Historical Med           ALLERGIES     is allergic to influenza virus vaccine, sulfa antibiotics, milk (cow), and amoxicillin.  FAMILY HISTORY     He indicated that his mother is . He indicated that his father is . He indicated that his paternal grandmother is . He indicated that his other is .     SOCIAL HISTORY       Social History     Tobacco Use    Smoking status: Some Days     Current packs/day: 0.50     Average packs/day: 0.5 packs/day for 35.0 years (17.5 ttl pk-yrs)     Types: Cigarettes    Smokeless tobacco: Never   Vaping Use    Vaping Use: Never used   Substance Use Topics    Alcohol use: Yes     Alcohol/week: 2.0 - 4.0 standard drinks of alcohol     Types: 2 - 4 Standard drinks or equivalent per week    Drug use: No          Erica B Goldberger, MD  Attending Emergency Physician       Goldberger, Erica B, MD  24 [phone]

## 2024-08-26 ENCOUNTER — OFFICE VISIT (OUTPATIENT)
Age: 59
End: 2024-08-26
Payer: COMMERCIAL

## 2024-08-26 VITALS — HEIGHT: 64 IN | WEIGHT: 150 LBS | BODY MASS INDEX: 25.61 KG/M2

## 2024-08-26 DIAGNOSIS — N50.3 CYST OF EPIDIDYMIS: ICD-10-CM

## 2024-08-26 DIAGNOSIS — R33.9 URINARY RETENTION: Primary | ICD-10-CM

## 2024-08-26 PROCEDURE — 99214 OFFICE O/P EST MOD 30 MIN: CPT | Performed by: SPECIALIST

## 2024-08-26 PROCEDURE — G8427 DOCREV CUR MEDS BY ELIG CLIN: HCPCS | Performed by: SPECIALIST

## 2024-08-26 PROCEDURE — 51705 CHANGE OF BLADDER TUBE: CPT | Performed by: SPECIALIST

## 2024-08-26 PROCEDURE — 3017F COLORECTAL CA SCREEN DOC REV: CPT | Performed by: SPECIALIST

## 2024-08-26 PROCEDURE — G8419 CALC BMI OUT NRM PARAM NOF/U: HCPCS | Performed by: SPECIALIST

## 2024-08-26 PROCEDURE — 4004F PT TOBACCO SCREEN RCVD TLK: CPT | Performed by: SPECIALIST

## 2024-08-26 NOTE — PROGRESS NOTES
Sourav Medina MD [facility] Urology Office Progress Note    Patient:  Heriberto Busby  YOB: 1965  Date: 8/26/2024    HISTORY OF PRESENT ILLNESS:   The patient is a 58 y.o. male  Chronic Suprapubic Catheter   Patient presents today with a history of a chronic suprapubic catheter for several months.  The etiology is felt to be due to: Retention.  Overall, the problem is unchanged.     Procedure Note (8/26/24):  The patient's suprapubic catheter was removed.  After cleaning him off with Hibiclens and sterile water, patient was upsized to an 24 [place] SP catheter, it was inserted into the bladder and the catheter hooked up to a drainage bag.  07 mL of sterile water was used to fill up the balloon.  The patient tolerated the procedure well.     Summary of old records:   Chronic urinary retention, chronic indwelling henderson catheter, can't do ISC, wants Suprapubic (SP) catheter so he can be sexually active; 12/15/23 Suprapubic (SP) catheter  BPH: Flomax/tamsulosin 0.4 mg po qd   Bilateral hydronephrosis and distended bladder to umbilicus on 5/17/23 CT, persistent (chronic) b/l hydro on 11/16/23 renal US  Bilateral epididymal cysts, L>R on 7/30/24 scrotal U/S with doppler    Additional History:   Patient has bilateral epididymal cysts, L>R on 7/30/24 scrotal U/S with doppler.  No surgical Rx required for these.  (This is a separate and significant E/M service and discussion performed on the same day of a minor procedure justifying a -25 modifier; separate issues discussed include bilateral epididymal cysts, chronic urinary retention).      Procedures Today: N/A    Urinalysis today:  No results found for this visit on 08/26/24.    Last several PSA's:  Lab Results   Component Value Date    PSA 2.95 02/09/2021    PSA 3.99 12/16/2019       Last total testosterone:  No results found for: \"TESTOSTERONE\"    Last BUN and creatinine:  Lab Results   Component Value Date    BUN 27 (H) 07/30/2024

## 2024-09-19 LAB
BUN / CREAT RATIO: NORMAL
BUN BLDV-MCNC: 31 MG/DL
CREAT SERPL-MCNC: 1.77 MG/DL

## 2024-09-23 ENCOUNTER — OFFICE VISIT (OUTPATIENT)
Age: 59
End: 2024-09-23
Payer: COMMERCIAL

## 2024-09-23 VITALS — HEIGHT: 64 IN | BODY MASS INDEX: 25.61 KG/M2 | WEIGHT: 150 LBS

## 2024-09-23 DIAGNOSIS — R33.9 URINARY RETENTION: Primary | ICD-10-CM

## 2024-09-23 PROCEDURE — 51705 CHANGE OF BLADDER TUBE: CPT | Performed by: SPECIALIST

## 2024-09-23 RX ORDER — MUPIROCIN 20 MG/G
1 OINTMENT TOPICAL 3 TIMES DAILY
COMMUNITY
Start: 2024-09-19

## 2024-09-23 RX ORDER — CITALOPRAM HYDROBROMIDE 20 MG/1
20 TABLET ORAL DAILY
COMMUNITY
Start: 2024-09-05

## 2024-09-23 RX ORDER — DOCUSATE SODIUM 100 MG/1
CAPSULE, LIQUID FILLED ORAL
COMMUNITY
Start: 2024-09-19

## 2024-09-23 RX ORDER — BUSPIRONE HYDROCHLORIDE 10 MG/1
10 TABLET ORAL 3 TIMES DAILY
COMMUNITY
Start: 2024-09-05

## 2024-09-23 RX ORDER — MUPIROCIN 20 MG/G
OINTMENT TOPICAL
COMMUNITY
Start: 2024-09-19 | End: 2024-09-23

## 2024-10-21 ENCOUNTER — OFFICE VISIT (OUTPATIENT)
Age: 59
End: 2024-10-21
Payer: COMMERCIAL

## 2024-10-21 DIAGNOSIS — R33.9 URINARY RETENTION: Primary | ICD-10-CM

## 2024-10-21 DIAGNOSIS — N32.89 BLADDER SPASMS: ICD-10-CM

## 2024-10-21 PROCEDURE — 3017F COLORECTAL CA SCREEN DOC REV: CPT | Performed by: SPECIALIST

## 2024-10-21 PROCEDURE — G8419 CALC BMI OUT NRM PARAM NOF/U: HCPCS | Performed by: SPECIALIST

## 2024-10-21 PROCEDURE — 4004F PT TOBACCO SCREEN RCVD TLK: CPT | Performed by: SPECIALIST

## 2024-10-21 PROCEDURE — G8427 DOCREV CUR MEDS BY ELIG CLIN: HCPCS | Performed by: SPECIALIST

## 2024-10-21 PROCEDURE — 99214 OFFICE O/P EST MOD 30 MIN: CPT | Performed by: SPECIALIST

## 2024-10-21 PROCEDURE — G8484 FLU IMMUNIZE NO ADMIN: HCPCS | Performed by: SPECIALIST

## 2024-10-21 PROCEDURE — 51705 CHANGE OF BLADDER TUBE: CPT | Performed by: SPECIALIST

## 2024-10-21 RX ORDER — SOLIFENACIN SUCCINATE 5 MG/1
5 TABLET, FILM COATED ORAL DAILY
Qty: 30 TABLET | Refills: 5 | Status: SHIPPED | OUTPATIENT
Start: 2024-10-21

## 2024-10-21 NOTE — PROGRESS NOTES
Sourav Medina MD [facility] Urology Office Progress Note    Patient:  Heriberto Busby  YOB: 1965  Date: 10/21/2024    HISTORY OF PRESENT ILLNESS:   The patient is a 59 y.o. male  Chronic Suprapubic Catheter   Patient presents today with a history of a chronic suprapubic catheter for several months.  The etiology is felt to be due to: Retention.  Overall, the problem is unchanged.     Procedure Note (10/21/24):  The patient's suprapubic catheter was removed.  After cleaning him off with Hibiclens and sterile water,2% Lidocaine was inserted. A 24 Croatian SP catheter, it was inserted into the bladder and the catheter hooked up to a drainage bag.  07 mL of sterile water was used to fill up the balloon.  The patient tolerated the procedure well.     Summary of old records:   Chronic urinary retention, chronic indwelling henderson catheter, can't do ISC, wants Suprapubic (SP) catheter so he can be sexually active; 12/15/23 Suprapubic (SP) catheter; bladder spasms: Solifenacin (Vesicare) 5 mg po qd 10/21/24  BPH: Flomax/tamsulosin 0.4 mg po qd   Bilateral hydronephrosis and distended bladder to umbilicus on 5/17/23 CT, persistent (chronic) b/l hydro on 11/16/23 renal US  Bilateral epididymal cysts, L>R on 7/30/24 scrotal U/S with doppler    Additional History:   Patient having increased bladder spasms with leaking via his urethra.  Will begin Solifenacin (Vesicare) 5 mg po qd for these.    Procedures Today: see above    Urinalysis today:  No results found for this visit on 10/21/24.    Last several PSA's:  Lab Results   Component Value Date    PSA 2.95 02/09/2021    PSA 3.99 12/16/2019       Last total testosterone:  No results found for: \"TESTOSTERONE\"    Last BUN and creatinine:  Lab Results   Component Value Date    BUN 31 09/19/2024     Lab Results   Component Value Date    CREATININE 1.77 09/19/2024       Last CBC:  Lab Results   Component Value Date    WBC 16.1 (H) 07/30/2024    HGB

## 2024-11-18 ENCOUNTER — OFFICE VISIT (OUTPATIENT)
Age: 59
End: 2024-11-18
Payer: COMMERCIAL

## 2024-11-18 VITALS — BODY MASS INDEX: 25.61 KG/M2 | WEIGHT: 150 LBS | HEIGHT: 64 IN

## 2024-11-18 DIAGNOSIS — R33.9 URINARY RETENTION: Primary | ICD-10-CM

## 2024-11-18 DIAGNOSIS — L92.9 ABNORMAL GRANULATION TISSUE OF ABDOMEN: ICD-10-CM

## 2024-11-18 DIAGNOSIS — N32.89 BLADDER SPASMS: ICD-10-CM

## 2024-11-18 PROCEDURE — 51705 CHANGE OF BLADDER TUBE: CPT | Performed by: SPECIALIST

## 2024-11-18 PROCEDURE — 17250 CHEM CAUT OF GRANLTJ TISSUE: CPT | Performed by: SPECIALIST

## 2024-11-18 NOTE — PROGRESS NOTES
Sourav Medina MD [facility] Urology Office Progress Note    Patient:  Heriberto Busby  YOB: 1965  Date: 11/18/2024    HISTORY OF PRESENT ILLNESS:   The patient is a 59 y.o. male  Chronic Suprapubic Catheter   Patient presents today with a history of a chronic suprapubic catheter for several months.  The etiology is felt to be due to: Retention.  Overall, the problem is unchanged.     Procedure Note (11/18/24):  The patient's suprapubic catheter was removed.  After cleaning him off with Hibiclens and sterile water,2% Lidocaine was inserted. A 24 Syrian SP catheter, it was inserted into the bladder and the catheter hooked up to a drainage bag.  07 mL of sterile water was used to fill up the balloon. The patient tolerated the procedure well.     Summary of old records:   Chronic urinary retention, chronic indwelling henderson catheter, can't do ISC, wants Suprapubic (SP) catheter so he can be sexually active; 12/15/23 Suprapubic (SP) catheter; bladder spasms: Solifenacin (Vesicare) 5 mg po qd 10/21/24  BPH: Flomax/tamsulosin 0.4 mg po qd   Bilateral hydronephrosis and distended bladder to umbilicus on 5/17/23 CT, persistent (chronic) b/l hydro on 11/16/23 renal US  Bilateral epididymal cysts, L>R on 7/30/24 scrotal U/S with doppler    Additional History: Silver nitrate applied to Suprapubic (SP) catheter exit site granulation tissue.      Procedures Today: N/A    Urinalysis today:  No results found for this visit on 11/18/24.    Last several PSA's:  Lab Results   Component Value Date    PSA 2.95 02/09/2021    PSA 3.99 12/16/2019       Last total testosterone:  No results found for: \"TESTOSTERONE\"    Last BUN and creatinine:  Lab Results   Component Value Date    BUN 31 09/19/2024     Lab Results   Component Value Date    CREATININE 1.77 09/19/2024       Last CBC:  Lab Results   Component Value Date    WBC 16.1 (H) 07/30/2024    HGB 15.0 07/30/2024    HCT 47.7 07/30/2024    MCV 97.5

## 2024-12-16 ENCOUNTER — OFFICE VISIT (OUTPATIENT)
Age: 59
End: 2024-12-16
Payer: COMMERCIAL

## 2024-12-16 VITALS — WEIGHT: 150 LBS | BODY MASS INDEX: 25.61 KG/M2 | HEIGHT: 64 IN

## 2024-12-16 DIAGNOSIS — L92.9 ABNORMAL GRANULATION TISSUE OF ABDOMEN: ICD-10-CM

## 2024-12-16 DIAGNOSIS — N32.89 BLADDER SPASMS: ICD-10-CM

## 2024-12-16 DIAGNOSIS — R33.9 URINARY RETENTION: Primary | ICD-10-CM

## 2024-12-16 PROCEDURE — 51705 CHANGE OF BLADDER TUBE: CPT | Performed by: SPECIALIST

## 2024-12-16 PROCEDURE — 17250 CHEM CAUT OF GRANLTJ TISSUE: CPT | Performed by: SPECIALIST

## 2024-12-16 NOTE — PROGRESS NOTES
Sourav Medina MD [facility] Urology Office Progress Note    Patient:  Heriberto Busby  YOB: 1965  Date: 12/16/2024    HISTORY OF PRESENT ILLNESS:   The patient is a 59 y.o. male  Chronic Suprapubic Catheter   Patient presents today with a history of a chronic suprapubic catheter for several months.  The etiology is felt to be due to: Retention.  Overall, the problem is unchanged.     Procedure Note (12/16/24):  The patient's suprapubic catheter was removed.  After cleaning him off with Hibiclens and sterile water,2% Lidocaine was inserted. A 24 Montenegrin SP catheter, it was inserted into the bladder and the catheter hooked up to a drainage bag.  07 mL of sterile water was used to fill up the balloon. The patient tolerated the procedure well.     Summary of old records:   Chronic urinary retention, chronic indwelling henderson catheter, can't do ISC, wants Suprapubic (SP) catheter so he can be sexually active; 12/15/23 Suprapubic (SP) catheter; bladder spasms: Solifenacin (Vesicare) 5 mg po qd 10/21/24  BPH: Flomax/tamsulosin 0.4 mg po qd   Bilateral hydronephrosis and distended bladder to umbilicus on 5/17/23 CT, persistent (chronic) b/l hydro on 11/16/23 renal US  Bilateral epididymal cysts, L>R on 7/30/24 scrotal U/S with doppler    Additional History: Silver nitrate applied to Suprapubic (SP) catheter exit site granulation tissue.        Procedures Today: see above    Urinalysis today:  No results found for this visit on 12/16/24.    Last several PSA's:  Lab Results   Component Value Date    PSA 2.95 02/09/2021    PSA 3.99 12/16/2019       Last total testosterone:  No results found for: \"TESTOSTERONE\"    Last BUN and creatinine:  Lab Results   Component Value Date    BUN 31 09/19/2024     Lab Results   Component Value Date    CREATININE 1.77 09/19/2024       Last CBC:  Lab Results   Component Value Date    WBC 16.1 (H) 07/30/2024    HGB 15.0 07/30/2024    HCT 47.7 07/30/2024    MCV

## 2025-01-15 ENCOUNTER — OFFICE VISIT (OUTPATIENT)
Age: 60
End: 2025-01-15
Payer: COMMERCIAL

## 2025-01-15 DIAGNOSIS — R33.9 URINARY RETENTION: Primary | ICD-10-CM

## 2025-01-15 DIAGNOSIS — N32.89 BLADDER SPASMS: ICD-10-CM

## 2025-01-15 DIAGNOSIS — L92.9 ABNORMAL GRANULATION TISSUE OF ABDOMEN: ICD-10-CM

## 2025-01-15 PROCEDURE — 17250 CHEM CAUT OF GRANLTJ TISSUE: CPT | Performed by: SPECIALIST

## 2025-01-15 PROCEDURE — 51705 CHANGE OF BLADDER TUBE: CPT | Performed by: SPECIALIST

## 2025-01-15 NOTE — PROGRESS NOTES
97.5 07/30/2024     07/30/2024       Additional Lab/Culture results: none    Imaging Reviewed during this Office Visit: none  (results were independently reviewed by physician and radiology report verified)    Physical Exam:    There were no vitals filed for this visit.  There is no height or weight on file to calculate BMI.  Patient is a 59 y.o. male in no acute distress and alert and oriented to person, place and time.    Assessment and Plan   Assessment   1. Urinary retention    2. Bladder spasms    3. Abnormal granulation tissue of abdomen            Plan    Return in about 4 weeks (around 2/12/2025) for SP tube change.  Silver nitrate applied to Suprapubic (SP) catheter exit site granulation tissue.             Sourav Medina MD FACS  1/15/2025 1:15 PM

## 2025-02-12 ENCOUNTER — OFFICE VISIT (OUTPATIENT)
Age: 60
End: 2025-02-12
Payer: COMMERCIAL

## 2025-02-12 VITALS — WEIGHT: 150 LBS | BODY MASS INDEX: 25.61 KG/M2 | HEIGHT: 64 IN

## 2025-02-12 DIAGNOSIS — R33.9 URINARY RETENTION: Primary | ICD-10-CM

## 2025-02-12 DIAGNOSIS — L92.9 ABNORMAL GRANULATION TISSUE OF ABDOMEN: ICD-10-CM

## 2025-02-12 DIAGNOSIS — N32.89 BLADDER SPASMS: ICD-10-CM

## 2025-02-12 PROCEDURE — 17250 CHEM CAUT OF GRANLTJ TISSUE: CPT | Performed by: SPECIALIST

## 2025-02-12 PROCEDURE — 51705 CHANGE OF BLADDER TUBE: CPT | Performed by: SPECIALIST

## 2025-02-12 NOTE — PROGRESS NOTES
Sourav Medina MD [facility] Urology Office Progress Note    Patient:  Heriberto Busby  YOB: 1965  Date: 2/12/2025    HISTORY OF PRESENT ILLNESS:   The patient is a 59 y.o. male  Chronic Suprapubic Catheter   Patient presents today with a history of a chronic suprapubic catheter for several months.  The etiology is felt to be due to: Retention.  Overall, the problem is unchanged.   Procedure Note (2/12/25):  The patient's suprapubic catheter was removed.  After cleaning him off with Hibiclens and sterile water,2% Lidocaine was inserted. A 24 Azerbaijani SP catheter, it was inserted into the bladder and the catheter hooked up to a drainage bag.  07 mL of sterile water was used to fill up the balloon. The patient tolerated the procedure well. Silver nitrate applied to Suprapubic (SP) catheter exit site granulation tissue.    Summary of old records:   Chronic urinary retention, chronic indwelling henderson catheter, can't do ISC, wants Suprapubic (SP) catheter so he can be sexually active; 12/15/23 Suprapubic (SP) catheter; bladder spasms: Solifenacin (Vesicare) 5 mg po qd 10/21/24  BPH: Flomax/tamsulosin 0.4 mg po qd   Bilateral hydronephrosis and distended bladder to umbilicus on 5/17/23 CT, persistent (chronic) b/l hydro on 11/16/23 renal US  Bilateral epididymal cysts, L>R on 7/30/24 scrotal U/S with doppler    Additional History: none    Procedures Today: see above    Urinalysis today:  No results found for this visit on 02/12/25.    Last several PSA's:  Lab Results   Component Value Date    PSA 2.95 02/09/2021    PSA 3.99 12/16/2019       Last total testosterone:  No results found for: \"TESTOSTERONE\"    Last BUN and creatinine:  Lab Results   Component Value Date    BUN 31 09/19/2024     Lab Results   Component Value Date    CREATININE 1.77 09/19/2024       Last CBC:  Lab Results   Component Value Date    WBC 16.1 (H) 07/30/2024    HGB 15.0 07/30/2024    HCT 47.7 07/30/2024    MCV 97.5

## 2025-03-17 ENCOUNTER — OFFICE VISIT (OUTPATIENT)
Age: 60
End: 2025-03-17
Payer: COMMERCIAL

## 2025-03-17 VITALS — WEIGHT: 150 LBS | BODY MASS INDEX: 25.61 KG/M2 | HEIGHT: 64 IN

## 2025-03-17 DIAGNOSIS — R33.9 URINARY RETENTION: Primary | ICD-10-CM

## 2025-03-17 DIAGNOSIS — N32.89 BLADDER SPASMS: ICD-10-CM

## 2025-03-17 DIAGNOSIS — L92.9 ABNORMAL GRANULATION TISSUE OF ABDOMEN: ICD-10-CM

## 2025-03-17 PROCEDURE — 51705 CHANGE OF BLADDER TUBE: CPT | Performed by: SPECIALIST

## 2025-03-17 PROCEDURE — 17250 CHEM CAUT OF GRANLTJ TISSUE: CPT | Performed by: SPECIALIST

## 2025-03-17 NOTE — PROGRESS NOTES
Sourav Medina MD [facility] Urology Office Progress Note    Patient:  Heriberto Busby  YOB: 1965  Date: 3/17/2025    HISTORY OF PRESENT ILLNESS:   The patient is a 59 y.o. male  Chronic Suprapubic Catheter   Patient presents today with a history of a chronic suprapubic catheter for several months.  The etiology is felt to be due to: Retention.  Overall, the problem is unchanged.   Procedure Note (3/17/25):  The patient's suprapubic catheter was removed.  After cleaning him off with Hibiclens and sterile water,2% Lidocaine was inserted. A 24 Citizen of [place] SP catheter, it was inserted into the bladder and the catheter hooked up to a drainage bag.  07 mL of sterile water was used to fill up the balloon. The patient tolerated the procedure well. Silver nitrate applied to Suprapubic (SP) catheter exit site granulation tissue.    Summary of old records:   Chronic urinary retention, chronic indwelling henderson catheter, can't do ISC, wants Suprapubic (SP) catheter so he can be sexually active; 12/15/23 Suprapubic (SP) catheter; bladder spasms: Solifenacin (Vesicare) 5 mg po qd 10/21/24  BPH: Flomax/tamsulosin 0.4 mg po qd   Bilateral hydronephrosis and distended bladder to umbilicus on 5/17/23 CT, persistent (chronic) b/l hydro on 11/16/23 renal US  Bilateral epididymal cysts, L>R on 7/30/24 scrotal U/S with doppler    Additional History: none    Procedures Today: see above    Urinalysis today:  No results found for this visit on 03/17/25.    Last several PSA's:  Lab Results   Component Value Date    PSA 2.95 02/09/2021    PSA 3.99 12/16/2019       Last total testosterone:  No results found for: \"TESTOSTERONE\"    Last BUN and creatinine:  Lab Results   Component Value Date    BUN 31 09/19/2024     Lab Results   Component Value Date    CREATININE 1.77 09/19/2024       Last CBC:  Lab Results   Component Value Date    WBC 16.1 (H) 07/30/2024    HGB 15.0 07/30/2024    HCT 47.7 07/30/2024    MCV 97.5

## 2025-04-21 ENCOUNTER — OFFICE VISIT (OUTPATIENT)
Age: 60
End: 2025-04-21
Payer: COMMERCIAL

## 2025-04-21 DIAGNOSIS — L92.9 ABNORMAL GRANULATION TISSUE OF ABDOMEN: ICD-10-CM

## 2025-04-21 DIAGNOSIS — R33.9 URINARY RETENTION: Primary | ICD-10-CM

## 2025-04-21 DIAGNOSIS — N32.89 BLADDER SPASMS: ICD-10-CM

## 2025-04-21 PROCEDURE — 51705 CHANGE OF BLADDER TUBE: CPT | Performed by: SPECIALIST

## 2025-04-21 NOTE — PROGRESS NOTES
Sourav Medina MD [facility] Urology Office Progress Note    Patient:  Heriberto Busby  YOB: 1965  Date: 4/21/2025    HISTORY OF PRESENT ILLNESS:   The patient is a 59 y.o. male  Chronic Suprapubic Catheter   Patient presents today with a history of a chronic suprapubic catheter for several months.  The etiology is felt to be due to: Retention.  Overall, the problem is unchanged.   Procedure Note (4/21/25):  The patient's suprapubic catheter was removed.  After cleaning him off with Hibiclens and sterile water, 24 Mongolian SP catheter, it was inserted into the bladder and the catheter hooked up to a drainage bag.  07 mL of sterile water was used to fill up the balloon. The patient tolerated the procedure well.     Summary of old records:   Chronic urinary retention, chronic indwelling henderson catheter, can't do ISC, wants Suprapubic (SP) catheter so he can be sexually active; 12/15/23 Suprapubic (SP) catheter; bladder spasms: Solifenacin (Vesicare) 5 mg po qd 10/21/24  BPH: Flomax/tamsulosin 0.4 mg po qd   Bilateral hydronephrosis and distended bladder to umbilicus on 5/17/23 CT, persistent (chronic) b/l hydro on 11/16/23 renal US  Bilateral epididymal cysts, L>R on 7/30/24 scrotal U/S with doppler     Additional History: none    Procedures Today: see above    Urinalysis today:  No results found for this visit on 04/21/25.    Last several PSA's:  Lab Results   Component Value Date    PSA 2.95 02/09/2021    PSA 3.99 12/16/2019       Last total testosterone:  No results found for: \"TESTOSTERONE\"    Last BUN and creatinine:  Lab Results   Component Value Date    BUN 31 09/19/2024     Lab Results   Component Value Date    CREATININE 1.77 09/19/2024       Last CBC:  Lab Results   Component Value Date    WBC 16.1 (H) 07/30/2024    HGB 15.0 07/30/2024    HCT 47.7 07/30/2024    MCV 97.5 07/30/2024     07/30/2024       Additional Lab/Culture results: none    Imaging Reviewed during

## 2025-05-12 ENCOUNTER — TELEPHONE (OUTPATIENT)
Age: 60
End: 2025-05-12

## 2025-05-12 RX ORDER — CIPROFLOXACIN 500 MG/1
500 TABLET, FILM COATED ORAL 2 TIMES DAILY
Qty: 14 TABLET | Refills: 0 | Status: SHIPPED | OUTPATIENT
Start: 2025-05-12

## 2025-05-12 NOTE — TELEPHONE ENCOUNTER
Spoke with patient and he said that he has his s/p cath open mercy gravity at all times and not clamping it.  He is not on any blood thinners. Patient will  his cipro tonight to start on his way home

## 2025-05-12 NOTE — TELEPHONE ENCOUNTER
Patient called to let us know that he has had blood clots and what seems to be chunks of skin in his leg/night bag.  He denies any other symptoms, but wanted to make sure this is alright.  Please advise.

## 2025-05-12 NOTE — TELEPHONE ENCOUNTER
Tried calling patient back (a couple different numbers), but was unable to get through to him nor able to leave message. Best # to try him back at is his work number [phone]

## 2025-05-12 NOTE — TELEPHONE ENCOUNTER
Make sure patient is leaving his Suprapubic (SP) catheter open to gravity AT ALL TIMES and not clamping it.  Hold any blood thinners.  Begin Cipro 500 mg po bid; see Rx.

## 2025-05-19 ENCOUNTER — OFFICE VISIT (OUTPATIENT)
Age: 60
End: 2025-05-19
Payer: COMMERCIAL

## 2025-05-19 DIAGNOSIS — N32.89 BLADDER SPASMS: ICD-10-CM

## 2025-05-19 DIAGNOSIS — R31.0 GROSS HEMATURIA: ICD-10-CM

## 2025-05-19 DIAGNOSIS — L92.9 ABNORMAL GRANULATION TISSUE OF ABDOMEN: ICD-10-CM

## 2025-05-19 DIAGNOSIS — R33.9 URINARY RETENTION: Primary | ICD-10-CM

## 2025-05-19 PROCEDURE — 3017F COLORECTAL CA SCREEN DOC REV: CPT | Performed by: SPECIALIST

## 2025-05-19 PROCEDURE — 99213 OFFICE O/P EST LOW 20 MIN: CPT | Performed by: SPECIALIST

## 2025-05-19 PROCEDURE — 51705 CHANGE OF BLADDER TUBE: CPT | Performed by: SPECIALIST

## 2025-05-19 PROCEDURE — G8427 DOCREV CUR MEDS BY ELIG CLIN: HCPCS | Performed by: SPECIALIST

## 2025-05-19 PROCEDURE — G8419 CALC BMI OUT NRM PARAM NOF/U: HCPCS | Performed by: SPECIALIST

## 2025-05-19 PROCEDURE — 4004F PT TOBACCO SCREEN RCVD TLK: CPT | Performed by: SPECIALIST

## 2025-05-19 NOTE — PROGRESS NOTES
Value Date    WBC 16.1 (H) 07/30/2024    HGB 15.0 07/30/2024    HCT 47.7 07/30/2024    MCV 97.5 07/30/2024     07/30/2024       Additional Lab/Culture results: none    Imaging Reviewed during this Office Visit: none  (results were independently reviewed by physician and radiology report verified)    Physical Exam:    There were no vitals filed for this visit.  There is no height or weight on file to calculate BMI.  Patient is a 59 y.o. male in no acute distress and alert and oriented to person, place and time.    Assessment and Plan   Assessment   1. Urinary retention    2. Abnormal granulation tissue of abdomen    3. Gross hematuria    4. Bladder spasms            Plan    Return in about 4 weeks (around 6/16/2025) for SP tube change.  The patient presents with recent gross hematuria and blood clot passage.  Patient placed on Cipro 500 mg po bid x 7 days empirically and this has helped.         Sourav Medina MD FACS  5/19/2025 8:19 AM

## 2025-06-16 ENCOUNTER — OFFICE VISIT (OUTPATIENT)
Age: 60
End: 2025-06-16
Payer: COMMERCIAL

## 2025-06-16 VITALS — HEIGHT: 64 IN | BODY MASS INDEX: 25.61 KG/M2 | WEIGHT: 150 LBS

## 2025-06-16 DIAGNOSIS — N32.89 BLADDER SPASMS: ICD-10-CM

## 2025-06-16 DIAGNOSIS — R33.9 URINARY RETENTION: Primary | ICD-10-CM

## 2025-06-16 PROCEDURE — 51705 CHANGE OF BLADDER TUBE: CPT | Performed by: SPECIALIST

## 2025-06-16 NOTE — PROGRESS NOTES
Sourav Medina MD [facility] Urology Office Progress Note    Patient:  Heriberto Busby  YOB: 1965  Date: 6/16/2025    HISTORY OF PRESENT ILLNESS:   The patient is a 59 y.o. male  Chronic Suprapubic Catheter   Patient presents today with a history of a chronic suprapubic catheter for several months.  The etiology is felt to be due to: Retention.  Overall, the problem is unchanged.   Procedure Note (6/16/25):  The patient's suprapubic catheter was removed.  After cleaning him off with Hibiclens and sterile water, 5 ml 2% lidocaine (20mg/ml) was instilled. A 24 Kuwaiti SP catheter, was then inserted into the bladder and the catheter hooked up to a drainage bag.  07 mL of sterile water was used to fill up the balloon. The patient tolerated the procedure well.     Summary of old records:   Chronic urinary retention, chronic indwelling henderson catheter, can't do ISC, wants Suprapubic (SP) catheter so he can be sexually active; 12/15/23 Suprapubic (SP) catheter; bladder spasms: Solifenacin (Vesicare) 5 mg po qd 10/21/24  BPH: Flomax/tamsulosin 0.4 mg po qd   Bilateral hydronephrosis and distended bladder to umbilicus on 5/17/23 CT, persistent (chronic) b/l hydro on 11/16/23 renal US  Bilateral epididymal cysts, L>R on 7/30/24 scrotal U/S with doppler    Additional History: none    Procedures Today: N/A    Urinalysis today:  No results found for this visit on 06/16/25.    Last several PSA's:  Lab Results   Component Value Date    PSA 2.95 02/09/2021    PSA 3.99 12/16/2019       Last total testosterone:  No results found for: \"TESTOSTERONE\"    Last BUN and creatinine:  Lab Results   Component Value Date    BUN 31 09/19/2024     Lab Results   Component Value Date    CREATININE 1.77 09/19/2024       Last CBC:  Lab Results   Component Value Date    WBC 16.1 (H) 07/30/2024    HGB 15.0 07/30/2024    HCT 47.7 07/30/2024    MCV 97.5 07/30/2024     07/30/2024       Additional Lab/Culture

## 2025-07-14 ENCOUNTER — OFFICE VISIT (OUTPATIENT)
Age: 60
End: 2025-07-14
Payer: COMMERCIAL

## 2025-07-14 VITALS — BODY MASS INDEX: 25.61 KG/M2 | HEIGHT: 64 IN | WEIGHT: 150 LBS

## 2025-07-14 DIAGNOSIS — R33.9 URINARY RETENTION: Primary | ICD-10-CM

## 2025-07-14 DIAGNOSIS — N32.89 BLADDER SPASMS: ICD-10-CM

## 2025-07-14 PROCEDURE — 51705 CHANGE OF BLADDER TUBE: CPT | Performed by: SPECIALIST

## 2025-07-14 NOTE — PROGRESS NOTES
Sourav Medina MD [facility] Urology Office Progress Note    Patient:  Heriberto Busby  YOB: 1965  Date: 7/14/2025    HISTORY OF PRESENT ILLNESS:   The patient is a 59 y.o. male  Chronic Suprapubic Catheter   Patient presents today with a history of a chronic suprapubic catheter for several months.  The etiology is felt to be due to: Retention.  Overall, the problem is unchanged.   Procedure Note (7/14/25):  The patient's suprapubic catheter was removed.  After cleaning him off with Hibiclens and sterile water, 5 ml 2% lidocaine (20mg/ml) was instilled. A 24 Niuean SP catheter, was then inserted into the bladder and the catheter hooked up to a drainage bag.  07 mL of sterile water was used to fill up the balloon. The patient tolerated the procedure well.     Summary of old records:   Chronic urinary retention, chronic indwelling henderson catheter, can't do ISC, wants Suprapubic (SP) catheter so he can be sexually active; 12/15/23 Suprapubic (SP) catheter; bladder spasms: Solifenacin (Vesicare) 5 mg po qd 10/21/24  BPH: Flomax/tamsulosin 0.4 mg po qd   Bilateral hydronephrosis and distended bladder to umbilicus on 5/17/23 CT, persistent (chronic) b/l hydro on 11/16/23 renal US  Bilateral epididymal cysts, L>R on 7/30/24 scrotal U/S with doppler    Additional History: none    Procedures Today: see above    Urinalysis today:  No results found for this visit on 07/14/25.    Last several PSA's:  Lab Results   Component Value Date    PSA 2.95 02/09/2021    PSA 3.99 12/16/2019       Last total testosterone:  No results found for: \"TESTOSTERONE\"    Last BUN and creatinine:  Lab Results   Component Value Date    BUN 31 09/19/2024     Lab Results   Component Value Date    CREATININE 1.77 09/19/2024       Last CBC:  Lab Results   Component Value Date    WBC 16.1 (H) 07/30/2024    HGB 15.0 07/30/2024    HCT 47.7 07/30/2024    MCV 97.5 07/30/2024     07/30/2024       Additional

## 2025-08-15 ENCOUNTER — TELEPHONE (OUTPATIENT)
Age: 60
End: 2025-08-15

## 2025-08-18 ENCOUNTER — OFFICE VISIT (OUTPATIENT)
Age: 60
End: 2025-08-18
Payer: COMMERCIAL

## 2025-08-18 DIAGNOSIS — R33.9 URINARY RETENTION: Primary | ICD-10-CM

## 2025-08-18 DIAGNOSIS — L92.9 ABNORMAL GRANULATION TISSUE OF ABDOMEN: ICD-10-CM

## 2025-08-18 DIAGNOSIS — N32.89 BLADDER SPASMS: ICD-10-CM

## 2025-08-18 PROCEDURE — 51705 CHANGE OF BLADDER TUBE: CPT | Performed by: SPECIALIST

## (undated) DEVICE — CATHETER FOL 2 W 16 FR URETH INDWL REG DOVER

## (undated) DEVICE — Device

## (undated) DEVICE — GLOVE SURG SZ 7 CRM LTX FREE POLYISOPRENE POLYMER BEAD ANTI

## (undated) DEVICE — RADIFOCUS GLIDEWIRE: Brand: GLIDEWIRE

## (undated) DEVICE — STRAP,CATHETER,ELASTIC,HOOK&LOOP: Brand: MEDLINE

## (undated) DEVICE — GOWN,SIRUS,NONRNF,SETINSLV,XL,20/CS: Brand: MEDLINE

## (undated) DEVICE — DRAINBAG,ANTI-REFLUX TOWER,L/F,2000ML,LL: Brand: MEDLINE

## (undated) DEVICE — SOLUTION IV 1000ML 0.9% SOD CHL PH 5 INJ USP VIAFLX PLAS

## (undated) DEVICE — SOLUTION IRRIG 3000ML STRL H2O USP UROMATIC PLAS CONT

## (undated) DEVICE — GLOVE SURG 7.5 11.7IN BEAD CUF LIGHT BRN SENSICARE LTX FREE

## (undated) DEVICE — EVACUATOR URO BLDR W/ ADPT UROVAC